# Patient Record
Sex: FEMALE | Race: WHITE | NOT HISPANIC OR LATINO | Employment: FULL TIME | URBAN - METROPOLITAN AREA
[De-identification: names, ages, dates, MRNs, and addresses within clinical notes are randomized per-mention and may not be internally consistent; named-entity substitution may affect disease eponyms.]

---

## 2021-06-23 ENCOUNTER — PATIENT MESSAGE (OUTPATIENT)
Dept: URGENT CARE | Facility: CLINIC | Age: 56
End: 2021-06-23

## 2021-06-23 ENCOUNTER — TELEPHONE (OUTPATIENT)
Dept: URGENT CARE | Facility: CLINIC | Age: 56
End: 2021-06-23

## 2021-06-23 ENCOUNTER — OFFICE VISIT (OUTPATIENT)
Dept: URGENT CARE | Facility: CLINIC | Age: 56
End: 2021-06-23
Payer: COMMERCIAL

## 2021-06-23 VITALS
BODY MASS INDEX: 29.84 KG/M2 | HEART RATE: 86 BPM | RESPIRATION RATE: 16 BRPM | HEIGHT: 60 IN | TEMPERATURE: 98 F | WEIGHT: 152 LBS | OXYGEN SATURATION: 98 % | SYSTOLIC BLOOD PRESSURE: 140 MMHG | DIASTOLIC BLOOD PRESSURE: 80 MMHG

## 2021-06-23 DIAGNOSIS — J02.9 SORE THROAT: ICD-10-CM

## 2021-06-23 DIAGNOSIS — J01.90 ACUTE SINUSITIS WITH SYMPTOMS GREATER THAN 10 DAYS: Primary | ICD-10-CM

## 2021-06-23 DIAGNOSIS — R05.9 COUGH: ICD-10-CM

## 2021-06-23 LAB
CTP QC/QA: YES
SARS-COV-2 RDRP RESP QL NAA+PROBE: NEGATIVE

## 2021-06-23 PROCEDURE — 3008F PR BODY MASS INDEX (BMI) DOCUMENTED: ICD-10-PCS | Mod: CPTII,S$GLB,, | Performed by: PHYSICIAN ASSISTANT

## 2021-06-23 PROCEDURE — 3008F BODY MASS INDEX DOCD: CPT | Mod: CPTII,S$GLB,, | Performed by: PHYSICIAN ASSISTANT

## 2021-06-23 PROCEDURE — U0002 COVID-19 LAB TEST NON-CDC: HCPCS | Mod: QW,S$GLB,, | Performed by: PHYSICIAN ASSISTANT

## 2021-06-23 PROCEDURE — U0002: ICD-10-PCS | Mod: QW,S$GLB,, | Performed by: PHYSICIAN ASSISTANT

## 2021-06-23 PROCEDURE — 99214 PR OFFICE/OUTPT VISIT, EST, LEVL IV, 30-39 MIN: ICD-10-PCS | Mod: S$GLB,,, | Performed by: PHYSICIAN ASSISTANT

## 2021-06-23 PROCEDURE — 99214 OFFICE O/P EST MOD 30 MIN: CPT | Mod: S$GLB,,, | Performed by: PHYSICIAN ASSISTANT

## 2021-06-23 RX ORDER — PROMETHAZINE HYDROCHLORIDE AND DEXTROMETHORPHAN HYDROBROMIDE 6.25; 15 MG/5ML; MG/5ML
5 SYRUP ORAL 3 TIMES DAILY PRN
Qty: 180 ML | Refills: 0 | Status: SHIPPED | OUTPATIENT
Start: 2021-06-23 | End: 2021-07-03

## 2021-06-23 RX ORDER — DOXYCYCLINE 100 MG/1
100 CAPSULE ORAL 2 TIMES DAILY
Qty: 20 CAPSULE | Refills: 0 | Status: SHIPPED | OUTPATIENT
Start: 2021-06-23 | End: 2021-07-03

## 2021-06-23 RX ORDER — CETIRIZINE HYDROCHLORIDE 10 MG/1
10 TABLET ORAL DAILY
COMMUNITY

## 2021-06-23 RX ORDER — MONTELUKAST SODIUM 10 MG/1
10 TABLET ORAL NIGHTLY
COMMUNITY
End: 2022-09-09

## 2021-06-23 RX ORDER — AMOXICILLIN AND CLAVULANATE POTASSIUM 875; 125 MG/1; MG/1
1 TABLET, FILM COATED ORAL 2 TIMES DAILY
Qty: 20 TABLET | Refills: 0 | Status: SHIPPED | OUTPATIENT
Start: 2021-06-23 | End: 2021-07-03

## 2021-06-23 RX ORDER — PREDNISONE 20 MG/1
TABLET ORAL
Qty: 10 TABLET | Refills: 0 | Status: SHIPPED | OUTPATIENT
Start: 2021-06-23 | End: 2022-09-09

## 2021-06-23 RX ORDER — BUPROPION HYDROCHLORIDE 75 MG/1
150 TABLET ORAL DAILY
COMMUNITY

## 2021-07-05 ENCOUNTER — OFFICE VISIT (OUTPATIENT)
Dept: URGENT CARE | Facility: CLINIC | Age: 56
End: 2021-07-05
Payer: COMMERCIAL

## 2021-07-05 VITALS
RESPIRATION RATE: 16 BRPM | TEMPERATURE: 98 F | HEART RATE: 90 BPM | SYSTOLIC BLOOD PRESSURE: 134 MMHG | HEIGHT: 60 IN | OXYGEN SATURATION: 98 % | WEIGHT: 150 LBS | BODY MASS INDEX: 29.45 KG/M2 | DIASTOLIC BLOOD PRESSURE: 80 MMHG

## 2021-07-05 DIAGNOSIS — N39.0 URINARY TRACT INFECTION WITHOUT HEMATURIA, SITE UNSPECIFIED: Primary | ICD-10-CM

## 2021-07-05 DIAGNOSIS — J06.9 UPPER RESPIRATORY TRACT INFECTION, UNSPECIFIED TYPE: ICD-10-CM

## 2021-07-05 DIAGNOSIS — J02.9 SORE THROAT: ICD-10-CM

## 2021-07-05 LAB
BILIRUB UR QL STRIP: NEGATIVE
CTP QC/QA: YES
GLUCOSE UR QL STRIP: NEGATIVE
KETONES UR QL STRIP: NEGATIVE
LEUKOCYTE ESTERASE UR QL STRIP: NEGATIVE
PH, POC UA: 5.5 (ref 5–8)
POC BLOOD, URINE: NEGATIVE
POC NITRATES, URINE: NEGATIVE
PROT UR QL STRIP: NEGATIVE
SARS-COV-2 RDRP RESP QL NAA+PROBE: NEGATIVE
SP GR UR STRIP: 1.02 (ref 1–1.03)
UROBILINOGEN UR STRIP-ACNC: NORMAL (ref 0.1–1.1)

## 2021-07-05 PROCEDURE — 81003 URINALYSIS AUTO W/O SCOPE: CPT | Mod: QW,S$GLB,, | Performed by: NURSE PRACTITIONER

## 2021-07-05 PROCEDURE — U0002: ICD-10-PCS | Mod: QW,S$GLB,, | Performed by: FAMILY MEDICINE

## 2021-07-05 PROCEDURE — U0002 COVID-19 LAB TEST NON-CDC: HCPCS | Mod: QW,S$GLB,, | Performed by: FAMILY MEDICINE

## 2021-07-05 PROCEDURE — 81003 POCT URINALYSIS, DIPSTICK, AUTOMATED, W/O SCOPE: ICD-10-PCS | Mod: QW,S$GLB,, | Performed by: NURSE PRACTITIONER

## 2021-07-05 PROCEDURE — 99214 OFFICE O/P EST MOD 30 MIN: CPT | Mod: S$GLB,,, | Performed by: FAMILY MEDICINE

## 2021-07-05 PROCEDURE — 99214 PR OFFICE/OUTPT VISIT, EST, LEVL IV, 30-39 MIN: ICD-10-PCS | Mod: S$GLB,,, | Performed by: FAMILY MEDICINE

## 2021-07-05 PROCEDURE — 3008F BODY MASS INDEX DOCD: CPT | Mod: CPTII,S$GLB,, | Performed by: FAMILY MEDICINE

## 2021-07-05 PROCEDURE — 3008F PR BODY MASS INDEX (BMI) DOCUMENTED: ICD-10-PCS | Mod: CPTII,S$GLB,, | Performed by: FAMILY MEDICINE

## 2021-07-05 RX ORDER — CEPHALEXIN 500 MG/1
500 CAPSULE ORAL EVERY 12 HOURS
Qty: 14 CAPSULE | Refills: 0 | Status: SHIPPED | OUTPATIENT
Start: 2021-07-05 | End: 2021-07-12

## 2021-07-05 RX ORDER — AMOXICILLIN AND CLAVULANATE POTASSIUM 875; 125 MG/1; MG/1
1 TABLET, FILM COATED ORAL
COMMUNITY
End: 2022-09-07

## 2021-07-08 LAB
BACTERIA UR CULT: NORMAL
BACTERIA UR CULT: NORMAL

## 2022-02-14 ENCOUNTER — OFFICE VISIT (OUTPATIENT)
Dept: ORTHOPEDICS | Facility: CLINIC | Age: 57
End: 2022-02-14
Attending: ORTHOPAEDIC SURGERY
Payer: COMMERCIAL

## 2022-02-14 VITALS
RESPIRATION RATE: 18 BRPM | HEIGHT: 60 IN | OXYGEN SATURATION: 99 % | HEART RATE: 90 BPM | BODY MASS INDEX: 30.41 KG/M2 | DIASTOLIC BLOOD PRESSURE: 60 MMHG | WEIGHT: 154.88 LBS | SYSTOLIC BLOOD PRESSURE: 115 MMHG

## 2022-02-14 DIAGNOSIS — M25.559 HIP PAIN: Primary | ICD-10-CM

## 2022-02-14 DIAGNOSIS — M70.62 TROCHANTERIC BURSITIS OF LEFT HIP: Primary | ICD-10-CM

## 2022-02-14 PROCEDURE — 99203 PR OFFICE/OUTPT VISIT, NEW, LEVL III, 30-44 MIN: ICD-10-PCS | Mod: 25,S$GLB,, | Performed by: ORTHOPAEDIC SURGERY

## 2022-02-14 PROCEDURE — 20610 LARGE JOINT ASPIRATION/INJECTION: L GREATER TROCHANTERIC BURSA: ICD-10-PCS | Mod: S$GLB,,, | Performed by: ORTHOPAEDIC SURGERY

## 2022-02-14 PROCEDURE — 1159F MED LIST DOCD IN RCRD: CPT | Mod: CPTII,S$GLB,, | Performed by: ORTHOPAEDIC SURGERY

## 2022-02-14 PROCEDURE — 20610 DRAIN/INJ JOINT/BURSA W/O US: CPT | Mod: S$GLB,,, | Performed by: ORTHOPAEDIC SURGERY

## 2022-02-14 PROCEDURE — 3078F PR MOST RECENT DIASTOLIC BLOOD PRESSURE < 80 MM HG: ICD-10-PCS | Mod: CPTII,S$GLB,, | Performed by: ORTHOPAEDIC SURGERY

## 2022-02-14 PROCEDURE — 99203 OFFICE O/P NEW LOW 30 MIN: CPT | Mod: 25,S$GLB,, | Performed by: ORTHOPAEDIC SURGERY

## 2022-02-14 PROCEDURE — 3078F DIAST BP <80 MM HG: CPT | Mod: CPTII,S$GLB,, | Performed by: ORTHOPAEDIC SURGERY

## 2022-02-14 PROCEDURE — 1159F PR MEDICATION LIST DOCUMENTED IN MEDICAL RECORD: ICD-10-PCS | Mod: CPTII,S$GLB,, | Performed by: ORTHOPAEDIC SURGERY

## 2022-02-14 PROCEDURE — 3008F PR BODY MASS INDEX (BMI) DOCUMENTED: ICD-10-PCS | Mod: CPTII,S$GLB,, | Performed by: ORTHOPAEDIC SURGERY

## 2022-02-14 PROCEDURE — 3074F SYST BP LT 130 MM HG: CPT | Mod: CPTII,S$GLB,, | Performed by: ORTHOPAEDIC SURGERY

## 2022-02-14 PROCEDURE — 99999 PR PBB SHADOW E&M-EST. PATIENT-LVL III: CPT | Mod: PBBFAC,,, | Performed by: ORTHOPAEDIC SURGERY

## 2022-02-14 PROCEDURE — 3074F PR MOST RECENT SYSTOLIC BLOOD PRESSURE < 130 MM HG: ICD-10-PCS | Mod: CPTII,S$GLB,, | Performed by: ORTHOPAEDIC SURGERY

## 2022-02-14 PROCEDURE — 3008F BODY MASS INDEX DOCD: CPT | Mod: CPTII,S$GLB,, | Performed by: ORTHOPAEDIC SURGERY

## 2022-02-14 PROCEDURE — 99999 PR PBB SHADOW E&M-EST. PATIENT-LVL III: ICD-10-PCS | Mod: PBBFAC,,, | Performed by: ORTHOPAEDIC SURGERY

## 2022-02-14 RX ORDER — TRIAMCINOLONE ACETONIDE 40 MG/ML
40 INJECTION, SUSPENSION INTRA-ARTICULAR; INTRAMUSCULAR
Status: DISCONTINUED | OUTPATIENT
Start: 2022-02-14 | End: 2022-02-14 | Stop reason: HOSPADM

## 2022-02-14 RX ADMIN — TRIAMCINOLONE ACETONIDE 40 MG: 40 INJECTION, SUSPENSION INTRA-ARTICULAR; INTRAMUSCULAR at 08:02

## 2022-02-14 NOTE — PROGRESS NOTES
NEW PATIENT ORTHOPAEDIC: HIP    PRIMARY CARE PHYSICIAN: Primary Doctor No   REFERRING PROVIDER: Khoa Goodman MD  [address] [facility]  [place] [address]     ASSESSMENT & PLAN:    Impression:  Left Hip Trochanteric Bursitis    Follow Up Plan: PRN     Injection:     Malika Curtis has physical exam evidence of trochanteric bursitis and wishes to pursue an injection. We discussed the risk, benefits, and expectations regarding injection. We discussed alternative modalities which included physical therapy, topical and prescription pain medications, ambulatory devices. They would like to proceed with injection today. See procedure note for billing purposes. If fails injection modality will consider alternative treatments or referrals.     Non operative care:    Malika Curtis has physical exam evidence of above and wishes to pursue an non-operative care. I am recommending the following: injection, activity modification. If injection fails to help, next step would be physical therapy.     The patient has been ordered:  Office Hip Injection    CONSULTS:   None    ACTIVE PROBLEM LIST  There is no problem list on file for this patient.          SUBJECTIVE    CHIEF COMPLAINT: Hip Pain    HPI:   Malika Curtis is a 57 y.o. female here for evaluation and management of left hip pain. There is not a specific incident that brought about this pain. she has had progressive problems with the hip(s) starting 4 days ago and is progressing which is now interfering with activities which include: walking, functional household ADL's, rising from a sitting position, standing for prolonged periods of time, dressing and climbing stairs     Currently the pain in the joint is moderate with activity.  The pain is constant and is located in lateral hip.  The pain is described as aching, severe and sharp. Relieving factors include repositioning. Start up pain, lateral hip pain, cannot lie on side.     Malika Curtis has no additional  complaints.     PROGRESSIVE SYMPTOMS:  Pain impacting sleep  Pain impacting work  Pain worsened by weight bearing    FUNCTIONAL STATUS:   Climb a flight of stairs or walk up a hill     PREVIOUS TREATMENTS:  Medical: None  Physical Therapy: Activities Modified , ice, heat, massage, stretching   Previous Orthopaedic Surgery: None    REVIEW OF SYSTEMS:  PAIN ASSESSMENT:  See HPI.  MUSCULOSKELETAL: See HPI.  OTHER 10 point review of systems is negative except as stated in HPI above    PAST MEDICAL HISTORY   has no past medical history on file.     PAST SURGICAL HISTORY   has no past surgical history on file.     FAMILY HISTORY  family history is not on file.     SOCIAL HISTORY   reports that she has never smoked. She does not have any smokeless tobacco history on file.     ALLERGIES   Review of patient's allergies indicates:   Allergen Reactions    Sulfa (sulfonamide antibiotics)         MEDICATIONS   Current Outpatient Medications on File Prior to Visit   Medication Sig Dispense Refill    amoxicillin-clavulanate 875-125mg (AUGMENTIN) 875-125 mg per tablet Take 1 tablet by mouth every 12 (twelve) hours.      buPROPion (WELLBUTRIN) 75 MG tablet Take 75 mg by mouth 2 (two) times daily.      cetirizine (ZYRTEC) 10 MG tablet Take 10 mg by mouth once daily.      montelukast (SINGULAIR) 10 mg tablet Take 10 mg by mouth every evening.      predniSONE (DELTASONE) 20 MG tablet Take 2 tablets daily for 5 days (Patient not taking: Reported on 7/5/2021) 10 tablet 0     No current facility-administered medications on file prior to visit.          PHYSICAL EXAM   vitals were not taken for this visit.   There is no height or weight on file to calculate BMI.      All other systems deferred.  GENERAL:  No acute distress  HABITUS: Normal  GAIT: Antalgic  SKIN: Normal     HIP EXAM:    left:   ROM:   Flexion: 120 degrees    Internal Rotation: 30 degrees    External Rotation: 30 degrees    Abduction: 45 degrees    Adduction: 20  degrees  No apparent leg length discrepancy    Palpation: yes tenderness over Greater trochanter and Gluteal insertions   Pain is not reproduced with IR or ER of the hip  Strength: 5/5 hip flexion, abduction, knee flexion and extension   Straight leg raise: Negative   Neurovascular Status: Sensation intact to light touch in Sural, saphenous, SPN, DPN, Tibial nerve distribution  2+ pulse DP/PT, normal capillary refill, foot has normal warmth    DATA:  Diagnostic tests reviewed for today's visit:     Hip radiographs appears normal. No evidence of fracture, osseous abnormalities, or significant degenerative changes. There is small area of ossification about the greater trochanter on the left.

## 2022-02-14 NOTE — PROCEDURES
Large Joint Aspiration/Injection: L greater trochanteric bursa    Date/Time: 2/14/2022 8:40 AM  Performed by: Khoa Goodman MD  Authorized by: Khoa Goodman MD     Consent Done?:  Yes (Verbal)  Indications:  Pain  Site marked: the procedure site was marked    Timeout: prior to procedure the correct patient, procedure, and site was verified    Prep: patient was prepped and draped in usual sterile fashion      Local anesthesia used?: Yes    Local anesthetic:  Lidocaine 1% with epinephrine    Details:  Needle Size:  22 G  Location:  Hip  Site:  L greater trochanteric bursa  Medications:  40 mg triamcinolone acetonide 40 mg/mL  Patient tolerance:  Patient tolerated the procedure well with no immediate complications

## 2022-04-28 ENCOUNTER — OFFICE VISIT (OUTPATIENT)
Dept: ORTHOPEDICS | Facility: CLINIC | Age: 57
End: 2022-04-28
Payer: COMMERCIAL

## 2022-04-28 VITALS
WEIGHT: 153.19 LBS | SYSTOLIC BLOOD PRESSURE: 118 MMHG | OXYGEN SATURATION: 99 % | RESPIRATION RATE: 15 BRPM | DIASTOLIC BLOOD PRESSURE: 85 MMHG | HEART RATE: 94 BPM | BODY MASS INDEX: 30.08 KG/M2 | HEIGHT: 60 IN

## 2022-04-28 DIAGNOSIS — M70.62 TROCHANTERIC BURSITIS OF LEFT HIP: Primary | ICD-10-CM

## 2022-04-28 DIAGNOSIS — M76.02 GLUTEAL TENDINITIS OF LEFT BUTTOCK: ICD-10-CM

## 2022-04-28 PROCEDURE — 3008F BODY MASS INDEX DOCD: CPT | Mod: CPTII,S$GLB,, | Performed by: ORTHOPAEDIC SURGERY

## 2022-04-28 PROCEDURE — 3079F DIAST BP 80-89 MM HG: CPT | Mod: CPTII,S$GLB,, | Performed by: ORTHOPAEDIC SURGERY

## 2022-04-28 PROCEDURE — 99213 PR OFFICE/OUTPT VISIT, EST, LEVL III, 20-29 MIN: ICD-10-PCS | Mod: S$GLB,,, | Performed by: ORTHOPAEDIC SURGERY

## 2022-04-28 PROCEDURE — 1159F PR MEDICATION LIST DOCUMENTED IN MEDICAL RECORD: ICD-10-PCS | Mod: CPTII,S$GLB,, | Performed by: ORTHOPAEDIC SURGERY

## 2022-04-28 PROCEDURE — 99999 PR PBB SHADOW E&M-EST. PATIENT-LVL IV: ICD-10-PCS | Mod: PBBFAC,,, | Performed by: ORTHOPAEDIC SURGERY

## 2022-04-28 PROCEDURE — 1159F MED LIST DOCD IN RCRD: CPT | Mod: CPTII,S$GLB,, | Performed by: ORTHOPAEDIC SURGERY

## 2022-04-28 PROCEDURE — 3074F SYST BP LT 130 MM HG: CPT | Mod: CPTII,S$GLB,, | Performed by: ORTHOPAEDIC SURGERY

## 2022-04-28 PROCEDURE — 3008F PR BODY MASS INDEX (BMI) DOCUMENTED: ICD-10-PCS | Mod: CPTII,S$GLB,, | Performed by: ORTHOPAEDIC SURGERY

## 2022-04-28 PROCEDURE — 99999 PR PBB SHADOW E&M-EST. PATIENT-LVL IV: CPT | Mod: PBBFAC,,, | Performed by: ORTHOPAEDIC SURGERY

## 2022-04-28 PROCEDURE — 99213 OFFICE O/P EST LOW 20 MIN: CPT | Mod: S$GLB,,, | Performed by: ORTHOPAEDIC SURGERY

## 2022-04-28 PROCEDURE — 3074F PR MOST RECENT SYSTOLIC BLOOD PRESSURE < 130 MM HG: ICD-10-PCS | Mod: CPTII,S$GLB,, | Performed by: ORTHOPAEDIC SURGERY

## 2022-04-28 PROCEDURE — 3079F PR MOST RECENT DIASTOLIC BLOOD PRESSURE 80-89 MM HG: ICD-10-PCS | Mod: CPTII,S$GLB,, | Performed by: ORTHOPAEDIC SURGERY

## 2022-04-28 RX ORDER — MELOXICAM 15 MG/1
15 TABLET ORAL DAILY
Qty: 30 TABLET | Refills: 1 | Status: SHIPPED | OUTPATIENT
Start: 2022-04-28 | End: 2022-07-28 | Stop reason: SDUPTHER

## 2022-04-28 NOTE — PROGRESS NOTES
NEW PATIENT ORTHOPAEDIC: HIP    PRIMARY CARE PHYSICIAN: Primary Doctor No   REFERRING PROVIDER: No referring provider defined for this encounter.     ASSESSMENT & PLAN:    Impression:  Left Hip Trochanteric Bursitis   Left Hip gluteal tendonitis     Follow Up Plan: PRN     Non operative care:    Malika Curtis has physical exam evidence of above and wishes to pursue an non-operative care. I am recommending the following: physical therapy, mobic.  Patient is following up 2 months after receiving an injection into her trochanteric bursa.  She reports that approximately 3 week she had almost complete resolution of her pain and symptoms.  Today she is reporting return of pain and some associated tightness in her lateral hip and back.  Review of her radiographs do demonstrate some area of gluteal tendinopathy and calcification.  There is also some mild scoliosis on her films.  She denies any significant radicular pains to suggest a back etiology however she is having difficulty sitting and cushion pillows help with her pain.  This would be atypical for I trochanteric bursitis.  She is sore in and along her gluteal tendons today so this may be more consistent with gluteal tendinopathy.  Ultimately I think the patient would benefit from physical therapy and possible consideration of dry needling with them.  In the interim I want her to take Mobic for pain control.  We looked over and reviewed some exercises for stretching and strengthening of her hip abductors.  If she takes the Mobic and this does not improve her pain she can come back in for repeat injection or I could consider and MRI or eval of lumbar spine.  But I think therapy will be of her biggest benefit.      The patient has been ordered:  Office Hip Injection    CONSULTS:   None    ACTIVE PROBLEM LIST  There is no problem list on file for this patient.       SUBJECTIVE    CHIEF COMPLAINT: Hip Pain    HPI:   Malika Curtis is a 57 y.o. female here for  evaluation and management of left hip pain. There is not a specific incident that brought about this pain. she has had progressive problems with the hip(s) starting 4 days ago and is progressing which is now interfering with activities which include: walking, functional household ADL's, rising from a sitting position, standing for prolonged periods of time, dressing and climbing stairs     Currently the pain in the joint is moderate with activity.  The pain is constant and is located in lateral hip.  The pain is described as aching, severe and sharp. Relieving factors include repositioning. Start up pain, lateral hip pain, cannot lie on side.     Malika Curtis has no additional complaints.     Interval history 4/28/22: shot helped for 3 weeks, since return of pain. Lateral hip. Worse with sitting and lying down. Interested in alternative modalities.     PROGRESSIVE SYMPTOMS:  Pain impacting sleep  Pain impacting work  Pain worsened by weight bearing    FUNCTIONAL STATUS:   Climb a flight of stairs or walk up a hill     PREVIOUS TREATMENTS:  Medical: None  Physical Therapy: Activities Modified , ice, heat, massage, stretching   Previous Orthopaedic Surgery: None    REVIEW OF SYSTEMS:  PAIN ASSESSMENT:  See HPI.  MUSCULOSKELETAL: See HPI.  OTHER 10 point review of systems is negative except as stated in HPI above    PAST MEDICAL HISTORY   has no past medical history on file.     PAST SURGICAL HISTORY   has no past surgical history on file.     FAMILY HISTORY  family history is not on file.     SOCIAL HISTORY   reports that she has never smoked. She has never used smokeless tobacco. She reports previous alcohol use. She reports that she does not use drugs.     ALLERGIES   Review of patient's allergies indicates:   Allergen Reactions    Sulfa (sulfonamide antibiotics)         MEDICATIONS   Current Outpatient Medications on File Prior to Visit   Medication Sig Dispense Refill    buPROPion (WELLBUTRIN) 75 MG tablet  Take 75 mg by mouth 2 (two) times daily.      cetirizine (ZYRTEC) 10 MG tablet Take 10 mg by mouth once daily.      montelukast (SINGULAIR) 10 mg tablet Take 10 mg by mouth every evening.      amoxicillin-clavulanate 875-125mg (AUGMENTIN) 875-125 mg per tablet Take 1 tablet by mouth every 12 (twelve) hours.      predniSONE (DELTASONE) 20 MG tablet Take 2 tablets daily for 5 days (Patient not taking: No sig reported) 10 tablet 0     No current facility-administered medications on file prior to visit.          PHYSICAL EXAM   height is 5' (1.524 m) and weight is 69.5 kg (153 lb 3.2 oz). Her blood pressure is 118/85 and her pulse is 94. Her respiration is 15 and oxygen saturation is 99%.   Body mass index is 29.92 kg/m².      All other systems deferred.  GENERAL:  No acute distress  HABITUS: Normal  GAIT: Antalgic  SKIN: Normal     HIP EXAM:    left:   ROM:   Flexion: 120 degrees    Internal Rotation: 30 degrees    External Rotation: 30 degrees    Abduction: 45 degrees    Adduction: 20 degrees  No apparent leg length discrepancy    Palpation: yes tenderness over Greater trochanter and Gluteal insertions   Pain is not reproduced with IR or ER of the hip  Strength: 5/5 hip flexion, abduction, knee flexion and extension   Straight leg raise: Negative   Neurovascular Status: Sensation intact to light touch in Sural, saphenous, SPN, DPN, Tibial nerve distribution  2+ pulse DP/PT, normal capillary refill, foot has normal warmth    DATA:  Diagnostic tests reviewed for today's visit:     Hip radiographs appears normal. No evidence of fracture, osseous abnormalities, or significant degenerative changes. There is small area of ossification about the greater trochanter on the left.

## 2022-05-09 ENCOUNTER — OFFICE VISIT (OUTPATIENT)
Dept: OBSTETRICS AND GYNECOLOGY | Facility: CLINIC | Age: 57
End: 2022-05-09
Payer: COMMERCIAL

## 2022-05-09 VITALS
BODY MASS INDEX: 30.05 KG/M2 | SYSTOLIC BLOOD PRESSURE: 128 MMHG | DIASTOLIC BLOOD PRESSURE: 90 MMHG | WEIGHT: 153.88 LBS

## 2022-05-09 DIAGNOSIS — N95.0 POSTMENOPAUSAL BLEEDING: Primary | ICD-10-CM

## 2022-05-09 DIAGNOSIS — Z12.31 BREAST CANCER SCREENING BY MAMMOGRAM: ICD-10-CM

## 2022-05-09 DIAGNOSIS — Z01.419 GYNECOLOGIC EXAM NORMAL: ICD-10-CM

## 2022-05-09 PROCEDURE — 99999 PR PBB SHADOW E&M-EST. PATIENT-LVL III: ICD-10-PCS | Mod: PBBFAC,,, | Performed by: OBSTETRICS & GYNECOLOGY

## 2022-05-09 PROCEDURE — 99386 PR PREVENTIVE VISIT,NEW,40-64: ICD-10-PCS | Mod: S$GLB,,, | Performed by: OBSTETRICS & GYNECOLOGY

## 2022-05-09 PROCEDURE — 3080F PR MOST RECENT DIASTOLIC BLOOD PRESSURE >= 90 MM HG: ICD-10-PCS | Mod: CPTII,S$GLB,, | Performed by: OBSTETRICS & GYNECOLOGY

## 2022-05-09 PROCEDURE — 3074F PR MOST RECENT SYSTOLIC BLOOD PRESSURE < 130 MM HG: ICD-10-PCS | Mod: CPTII,S$GLB,, | Performed by: OBSTETRICS & GYNECOLOGY

## 2022-05-09 PROCEDURE — 3008F PR BODY MASS INDEX (BMI) DOCUMENTED: ICD-10-PCS | Mod: CPTII,S$GLB,, | Performed by: OBSTETRICS & GYNECOLOGY

## 2022-05-09 PROCEDURE — 99999 PR PBB SHADOW E&M-EST. PATIENT-LVL III: CPT | Mod: PBBFAC,,, | Performed by: OBSTETRICS & GYNECOLOGY

## 2022-05-09 PROCEDURE — 1159F PR MEDICATION LIST DOCUMENTED IN MEDICAL RECORD: ICD-10-PCS | Mod: CPTII,S$GLB,, | Performed by: OBSTETRICS & GYNECOLOGY

## 2022-05-09 PROCEDURE — 3008F BODY MASS INDEX DOCD: CPT | Mod: CPTII,S$GLB,, | Performed by: OBSTETRICS & GYNECOLOGY

## 2022-05-09 PROCEDURE — 3080F DIAST BP >= 90 MM HG: CPT | Mod: CPTII,S$GLB,, | Performed by: OBSTETRICS & GYNECOLOGY

## 2022-05-09 PROCEDURE — 87624 HPV HI-RISK TYP POOLED RSLT: CPT | Performed by: OBSTETRICS & GYNECOLOGY

## 2022-05-09 PROCEDURE — 1159F MED LIST DOCD IN RCRD: CPT | Mod: CPTII,S$GLB,, | Performed by: OBSTETRICS & GYNECOLOGY

## 2022-05-09 PROCEDURE — 88175 CYTOPATH C/V AUTO FLUID REDO: CPT | Performed by: OBSTETRICS & GYNECOLOGY

## 2022-05-09 PROCEDURE — 99386 PREV VISIT NEW AGE 40-64: CPT | Mod: S$GLB,,, | Performed by: OBSTETRICS & GYNECOLOGY

## 2022-05-09 PROCEDURE — 3074F SYST BP LT 130 MM HG: CPT | Mod: CPTII,S$GLB,, | Performed by: OBSTETRICS & GYNECOLOGY

## 2022-05-09 RX ORDER — BUTALBITAL, ACETAMINOPHEN AND CAFFEINE 50; 325; 40 MG/1; MG/1; MG/1
1 TABLET ORAL EVERY 6 HOURS PRN
COMMUNITY
Start: 2022-03-29

## 2022-05-09 RX ORDER — DIAZEPAM 5 MG/1
5 TABLET ORAL DAILY PRN
COMMUNITY
Start: 2022-03-30

## 2022-05-09 RX ORDER — ALBUTEROL SULFATE 0.83 MG/ML
2.5 SOLUTION RESPIRATORY (INHALATION) EVERY 4 HOURS PRN
COMMUNITY
Start: 2022-03-30

## 2022-05-09 RX ORDER — NEOMYCIN SULFATE, POLYMYXIN B SULFATE, HYDROCORTISONE 3.5; 10000; 1 MG/ML; [USP'U]/ML; MG/ML
SOLUTION/ DROPS AURICULAR (OTIC)
COMMUNITY
Start: 2022-02-05 | End: 2022-09-09

## 2022-05-09 NOTE — PROGRESS NOTES
Subjective:       Patient ID: Malika Curtis is a 57 y.o. female.    Chief Complaint:  Well Woman (Post menopausal)      History of Present Illness  HPI  Annual Exam-Postmenopausal  Patient presents for annual exam. The patient has no complaints today. The patient is sexually active. GYN screening history: last pap: was normal and patient does not recall when last pap was. The patient is not taking hormone replacement therapy. Patient reports post-menopausal vaginal bleeding. Bleeding was only 1 episode on 4/07/22.  Last prior menses was 7/1/2020.  PT denies any blood in underwear or pad.  Pt only noticed bleeding after she wiped from urinating.  Bleeding only lasted one day.  The patient wears seatbelts: yes. The patient participates in regular exercise: no. Has the patient ever been transfused or tattooed?: not asked. The patient reports that there is not domestic violence in her life.    Colonoscopy:  Pt did colorgard last year and was normal.    MMG:  > 1 yr    GYN & OB History  No LMP recorded. Patient is postmenopausal.   Date of Last Pap: No result found    OB History   No obstetric history on file.       Review of Systems  Review of Systems   Constitutional: Negative.    Respiratory: Negative.    Cardiovascular: Negative.    Gastrointestinal: Negative.    Endocrine: Negative.    Genitourinary: Negative.    Musculoskeletal: Negative.    Neurological: Negative.    Hematological: Negative.    Psychiatric/Behavioral: Negative.    Breast: negative.            Objective:    Physical Exam:   Constitutional: She is oriented to person, place, and time. She appears well-developed and well-nourished. No distress.    HENT:   Head: Normocephalic and atraumatic.       Pulmonary/Chest: Effort normal. No respiratory distress. Right breast exhibits no inverted nipple, no mass, no nipple discharge, no skin change, no tenderness, presence, no bleeding, no swelling, no mastectomy, no augmentation and no lumpectomy. Left  breast exhibits no inverted nipple, no mass, no nipple discharge, no skin change, no tenderness, presence, no bleeding, no swelling, no mastectomy, no augmentation and no lumpectomy.        Abdominal: Soft. She exhibits no distension and no mass. There is no abdominal tenderness. There is no rebound and no guarding.     Genitourinary:    Vagina, uterus, right adnexa and left adnexa normal.   Rectum:      Guaiac result negative.      External hemorrhoid present.      No anal fissure, tenderness or abnormal anal tone.   Guaiac negative stool.    Pelvic exam was performed with patient supine.   The external female genitalia was normal.   No external genitalia lesions identified,Genitalia hair distrobution normal .   Labial bartholins normal.There is no rash, tenderness, lesion or injury on the right labia. There is no rash, tenderness, lesion or injury on the left labia. Cervix is normal. No no adexnal prolapse. Right adnexum displays no mass, no tenderness and no fullness. Left adnexum displays no mass, no tenderness and no fullness. Vagina exhibits no lesion. There is rectocele (grade 1) and cystocele (grade 1) in the vagina. No erythema,  no vaginal discharge, tenderness, bleeding or unspecified prolapse of vaginal walls in the vagina.    No foreign body in the vagina.      No signs of injury in the vagina.   Vagina was moist.Cervix exhibits no motion tenderness, no lesion, no discharge, no friability, no lesion, no tenderness and no polyp.    pap smear completedUterus consistancy normal. and Uerus contour normal  Uterus is not deviated, not enlarged, not fixed, not tender, not hosting fibroids and no uterine prolapse. Normal urethral meatus.Urethral Meatus exhibits: urethral lesion and prolapsedUrethra findings: no urethral mass, no tenderness and no urethral scarringBladder findings: no bladder distention and no bladder tenderness          Musculoskeletal: Normal range of motion and moves all extremeties. No  tenderness.       Neurological: She is alert and oriented to person, place, and time. No cranial nerve deficit. Coordination normal.    Skin: She is not diaphoretic.    Psychiatric: She has a normal mood and affect. Her behavior is normal. Judgment and thought content normal.          Assessment:        1. Postmenopausal bleeding    2. Breast cancer screening by mammogram    3. Gynecologic exam normal               Plan:      1.  Pap and HR HPV collected  2.  Pelvic US ordered; will consider EMBx if thickened endometrium  3. MMG ordered

## 2022-05-11 NOTE — PROGRESS NOTES
"See Plan of Care for complete Physical Therapy Evaluation.                                                       Physical Therapy Initial Evaluation     Name: Malika Curtis  Clinic Number: [id number]    Therapy Diagnosis:   Encounter Diagnoses   Name Primary?    Trochanteric bursitis of left hip     Decreased strength of lower extremity     Decreased range of motion of left lower extremity     Postural imbalance      Physician: Khoa Goodman MD    Physician Orders: PT Eval and Treat   Medical Diagnosis from Referral: M70.62 (ICD-10-CM) - Trochanteric bursitis of left hip  Evaluation Date: 5/12/2022  Authorization Period Expiration: 12/31/2022  Plan of Care Expiration: 8/12/22  Visit # / Visits authorized: 1/ 1    Time In: 1105a  Time Out: 1148a  Total Billable Time: 43 minutes    Precautions: Standard      TREATMENT     Treatment Time In: 1132a  Treatment Time Out: 1148a  Total Treatment time separate from Evaluation: 16 minutes    Malkia received therapeutic exercises to develop strength, ROM and flexibility for 16 minutes including:    Supine ITB str c/ Strap 30"x3 LLE  Supine hip flexor str c/ strap 30"x3  Seated Piriformis str 30"x3 LLE  Bridges 2x10    NV: Nustep, pallof press, dead bug, clams, prone hip ext, standing hip abduction     Short Term GOALS: 5 weeks. Pt agrees with goals set.  1. Patient demonstrates independence with HEP.   2. Patient demonstrates independence with Postural Awareness.   3. Patient demonstrates independence with body mechanics.   4. Patient will report pain of </=6/10 at worst, on 0-10 pain scale, with all activity  5. Patient demonstrates ability to perform light activities around her home with no difficulty to improve tolerance to functional tasks pain free.   6. Patient demonstrates increased global hip strength to 4+/5 or greater to improve tolerance to functional activities pain free    Long Term GOALS: 10 weeks. Pt agrees with goals set.  1. Patient demonstrates " ability to stand for 1 hour or greater with little to no difficulty to improve tolerance to functional tasks pain free  2. Patient demonstrates increased strength BLE's to 5/5 or greater to improve tolerance to functional activities pain free.   3. Patient demonstrates improved overall function per FOTO Hip Survey to 30% Limitation or less.   4. Patient will report pain of </=2/10 at worst, on 0-10 pain scale, with all activity  5. Patient demonstrates ability to sit on the floor to fold laundry with little to no difficulty to return to PLOF.     PLAN     Plan of care Certification: 5/12/2022 to 8/12/22.    Outpatient Physical Therapy 2 times weekly for 10 weeks to include the following interventions: Cervical/Lumbar Traction, Gait Training, Manual Therapy, Moist Heat/ Ice, Neuromuscular Re-ed, Patient Education, Therapeutic Activities, Therapeutic Exercise and FDN.  Pt may be seen by PTA as part of the rehabilitation team.     Jen Hamm, PT,DPT  5/12/2022      .

## 2022-05-12 ENCOUNTER — CLINICAL SUPPORT (OUTPATIENT)
Dept: REHABILITATION | Facility: HOSPITAL | Age: 57
End: 2022-05-12
Attending: ORTHOPAEDIC SURGERY
Payer: COMMERCIAL

## 2022-05-12 DIAGNOSIS — M25.662 DECREASED RANGE OF MOTION OF LEFT LOWER EXTREMITY: ICD-10-CM

## 2022-05-12 DIAGNOSIS — R29.898 DECREASED STRENGTH OF LOWER EXTREMITY: ICD-10-CM

## 2022-05-12 DIAGNOSIS — R29.3 POSTURAL IMBALANCE: ICD-10-CM

## 2022-05-12 DIAGNOSIS — M70.62 TROCHANTERIC BURSITIS OF LEFT HIP: ICD-10-CM

## 2022-05-12 PROCEDURE — 97161 PT EVAL LOW COMPLEX 20 MIN: CPT | Mod: PN

## 2022-05-12 PROCEDURE — 97110 THERAPEUTIC EXERCISES: CPT | Mod: PN

## 2022-05-13 LAB
CLINICAL INFO: NORMAL
CYTO CVX: NORMAL
CYTOLOGIST CVX/VAG CYTO: NORMAL
CYTOLOGIST CVX/VAG CYTO: NORMAL
CYTOLOGY CMNT CVX/VAG CYTO-IMP: NORMAL
CYTOLOGY PAP THIN PREP EXPLANATION: NORMAL
DATE OF PREVIOUS PAP: NORMAL
DATE PREVIOUS BX: NO
GEN CATEG CVX/VAG CYTO-IMP: NORMAL
HPV I/H RISK 4 DNA CVX QL NAA+PROBE: NOT DETECTED
LMP START DATE: 2020
MICROORGANISM CVX/VAG CYTO: NORMAL
PATHOLOGIST CVX/VAG CYTO: NORMAL
SERVICE CMNT-IMP: NORMAL
SPECIMEN SOURCE CVX/VAG CYTO: NORMAL
STAT OF ADQ CVX/VAG CYTO-IMP: NORMAL

## 2022-05-13 NOTE — PLAN OF CARE
Physical Therapy Initial Evaluation     Name: Malika Curtis  Clinic Number: [id number]    Therapy Diagnosis:   Encounter Diagnoses   Name Primary?    Trochanteric bursitis of left hip     Decreased strength of lower extremity     Decreased range of motion of left lower extremity     Postural imbalance      Physician: Khoa Goodman MD    Physician Orders: PT Eval and Treat   Medical Diagnosis from Referral: M70.62 (ICD-10-CM) - Trochanteric bursitis of left hip  Evaluation Date: 5/12/2022  Authorization Period Expiration: 12/31/2022  Plan of Care Expiration: 8/12/22  Visit # / Visits authorized: 1/ 1    Time In: 1109t  Time Out: 4187e  Total Billable Time: 43 minutes    Precautions: Standard    Subjective       Medical History:   No past medical history on file.    Surgical History:   Malika Curtis  has no past surgical history on file.    Medications:   Malika has a current medication list which includes the following prescription(s): albuterol, amoxicillin-clavulanate 875-125mg, bupropion, butalbital-acetaminophen-caffeine -40 mg, cetirizine, diazepam, meloxicam, montelukast, neomycin-polymyxin-hydrocortisone, and prednisone.    Allergies:   Review of patient's allergies indicates:   Allergen Reactions    Sulfa (sulfonamide antibiotics)         Date of onset: 2 months  History of current condition - Malika reports: About 2 months ago she started to have hip pain. She has had a pain and cortisone shot which helped the pain and then she started to compensate and everything else would start to hurt because of the pain. She started to take medication that has helped with the pain but she is still taking it and is unsure how she would do without it. Before the medicine she could not even sit in her car without the pain. She had to sit on a cushion to help with the pain. She reports that she does report that she gets some tingling in the front of  the knee. She denies N/T that goes down to her foot. She does report that she is feeling better but she still cannot sit on the floor. She tried to sit on the floor last night to fold clothes but had immediate pain and stopped. Her biggest worry right now is that she is creating other issues for herself. She would also like advice on how to adapt her everyday life.     Imaging, x-ray: per chart    FINDINGS:  Minimal mild levoscoliosis mid lumbar spine with elevation right hemipelvis.  Chronic periosteal reaction, hypertrophic change greater trochanters particularly on left with vague soft tissue density superior to left greater trochanter 1.1 cm.     Impression:     No fracture dislocation.  Additional findings above.    Prior Therapy: none  Social History: single story home lives with their family  Occupation: desk job  Prior Level of Function: independent  DME owned/used: none  Current Level of Function: sitting on the floor, walking or standing prolonged periods of time    Pain:  Current 1/10, worst 8/10, best 1/10   Location: left hip   Description: Throbbing and Sharp  Aggravating Factors: sitting on the floor, standing prolonged periods of time  Easing Factors: ice, medication    Pts goals: I want to be able to walking longer distances      Objective     Posture: Left lateral trunk lean    LUMBAR SPINE AROM:   Flexion: Min whittington   Extension: Min whittington pain radiating to her knee   Left Sidebend: WFL with some symptoms in knee   Right Sidebend: WFL   Left Rotation: WFL   Right Rotation: Mod whittington with pain in LLE     HIP AROM:      L  R    Flexion:    110 pain in lateral hip 110     Strength: Knee   Left Right   Quadriceps 4/5 5/5   Hamstrings 4+/5 5/5       Strength: Hip    Left Right   Iliopsoas 4/5 pain anterior thigh 5/5   Glute Med 4/5 4/5   IR 4/5 pain in knee and hip 4+/5   ER 4+/5 4+/5   Ext 3/5 4+/5     Dermatomes: Sensation: Light Touch: Intact  Flexibility: decrease piriformis flexibility bilaterally;  "Tight left ITB    Special Tests:    Left  Right    SLR  - -   Hip Scour  - -   FADIR - -   CAROLINE   + -   Lakesha + -   Sacral Thrust  - -       Joint Mobility: WNL  Palpation: TTP over greater trochanter; Glute max  Sensation: intact to light touch      Gait: Kirsten ambulated with none.  Level of Assistance: independent  Patient displays slight increase in trunk sway.         CMS Impairment/Limitation/Restriction for FOTO Hip Survey    Therapist reviewed FOTO scores for Malika Curtis on 5/12/2022.   FOTO documents entered into EPIC - see Media section.    Limitation Score: 42%          TREATMENT     Treatment Time In: 1132a  Treatment Time Out: 1148a  Total Treatment time separate from Evaluation: 16 minutes    Malika received therapeutic exercises to develop strength, ROM and flexibility for 16 minutes including:    Supine ITB str c/ Strap 30"x3 LLE  Supine hip flexor str c/ strap 30"x3  Seated Piriformis str 30"x3 LLE  Bridges 2x10    NV: Nustep, pallof press, dead bug, clams, prone hip ext, standing hip abduction     Home Exercises and Patient Education Provided    Education provided:   - HEP  - POC  - FDN      Written Home Exercises Provided: yes.  Exercises were reviewed and Malika was able to demonstrate them prior to the end of the session.  Malika demonstrated good  understanding of the education provided.     See EMR under Patient Instructions for exercises provided 5/12/2022.  .    ASSESSMENT       Malika is a 57 y.o. female referred to outpatient Physical Therapy with a medical diagnosis of Trochanteric bursitis of left hip. with signs and symptoms including: increased hip pain, decreased hip ROM, decreased LE Strength, soft tissue dysfunction, postural imbalance,impaired joint mobility, and decreased tolerance to functional activities. She displayed increase in pain/tightness with Lakesha and CAROLINE indicating ITB and piriformis tightness. Signs and symptoms consistent with medical diagnosis. Pt may " benefit from FDN at future sessions to decrease muscle tension.   Pt with good motivation to perform physical activity and responds well to cueing.  Pt prognosis is Good.  Pt will benefit from skilled outpatient physical therapy to address the above stated deficits, provide pt/family education and to maximize pt's level of independence.     Medical necessity is demonstrated by the following IMPAIRMENTS/PROBLEMS:  weakness and impaired muscle length    Plan of care discussed with patient: Yes  Pt's spiritual, cultural and educational needs considered and patient is agreeable to the plan of care and goals as stated below:     Anticipated Barriers for therapy: chronicity of condition     Medical Necessity is demonstrated by the following  History  Co-morbidities and personal factors that may impact the plan of care Co-morbidities:   high BMI    Personal Factors:   age  social background  lifestyle     moderate   Examination  Body Structures and Functions, activity limitations and participation restrictions that may impact the plan of care Body Regions:   back  lower extremities    Body Systems:    gross symmetry  ROM  strength  gait  transfers  transitions  motor control  motor learning    Participation Restrictions:   Sitting or standing for prolonged periods of time    Activity limitations:   Learning and applying knowledge  no deficits    General Tasks and Commands  no deficits    Communication  no deficits    Mobility  lifting and carrying objects  walking  sitting    Self care  no deficits    Domestic Life  shopping  doing house work (cleaning house, washing dishes, laundry)  assisting others    Interactions/Relationships  No deficits    Life Areas  No deficits    Community and Social Life  No deficits         moderate   Clinical Presentation stable and uncomplicated low   Decision Making/ Complexity Score: low       Short Term GOALS: 5 weeks. Pt agrees with goals set.  1. Patient demonstrates independence with  HEP.   2. Patient demonstrates independence with Postural Awareness.   3. Patient demonstrates independence with body mechanics.   4. Patient will report pain of </=6/10 at worst, on 0-10 pain scale, with all activity  5. Patient demonstrates ability to perform light activities around her home with no difficulty to improve tolerance to functional tasks pain free.   6. Patient demonstrates increased global hip strength to 4+/5 or greater to improve tolerance to functional activities pain free    Long Term GOALS: 10 weeks. Pt agrees with goals set.  1. Patient demonstrates ability to stand for 1 hour or greater with little to no difficulty to improve tolerance to functional tasks pain free  2. Patient demonstrates increased strength BLE's to 5/5 or greater to improve tolerance to functional activities pain free.   3. Patient demonstrates improved overall function per FOTO Hip Survey to 30% Limitation or less.   4. Patient will report pain of </=2/10 at worst, on 0-10 pain scale, with all activity  5. Patient demonstrates ability to sit on the floor to fold laundry with little to no difficulty to return to PLOF.     PLAN     Plan of care Certification: 5/12/2022 to 8/12/22.    Outpatient Physical Therapy 2 times weekly for 10 weeks to include the following interventions: Cervical/Lumbar Traction, Gait Training, Manual Therapy, Moist Heat/ Ice, Neuromuscular Re-ed, Patient Education, Therapeutic Activities, Therapeutic Exercise and FDN.  Pt may be seen by PTA as part of the rehabilitation team.     Jen Hamm, PT,DPT  5/12/2022    I have seen the patient, reviewed the therapist's plan of care, and I agree with the plan of care.      I certify the need for these services furnished under this plan of treatment and while under my care.     ___________________ ________ Physician/Referring Practitioner            ___________________________ Date of Signature      .

## 2022-05-23 ENCOUNTER — HOSPITAL ENCOUNTER (OUTPATIENT)
Dept: RADIOLOGY | Facility: HOSPITAL | Age: 57
Discharge: HOME | End: 2022-05-23
Attending: OBSTETRICS & GYNECOLOGY
Payer: COMMERCIAL

## 2022-05-23 VITALS — WEIGHT: 153.88 LBS | HEIGHT: 60 IN | BODY MASS INDEX: 30.21 KG/M2

## 2022-05-23 DIAGNOSIS — Z12.31 BREAST CANCER SCREENING BY MAMMOGRAM: ICD-10-CM

## 2022-05-23 DIAGNOSIS — N95.0 POSTMENOPAUSAL BLEEDING: ICD-10-CM

## 2022-05-23 PROCEDURE — 76830 US PELVIS COMP WITH TRANSVAG NON-OB (XPD): ICD-10-PCS | Mod: 26,,, | Performed by: RADIOLOGY

## 2022-05-23 PROCEDURE — 77063 MAMMO DIGITAL SCREENING BILAT WITH TOMO: ICD-10-PCS | Mod: 26,,, | Performed by: RADIOLOGY

## 2022-05-23 PROCEDURE — 76830 TRANSVAGINAL US NON-OB: CPT | Mod: TC

## 2022-05-23 PROCEDURE — 77067 MAMMO DIGITAL SCREENING BILAT WITH TOMO: ICD-10-PCS | Mod: 26,,, | Performed by: RADIOLOGY

## 2022-05-23 PROCEDURE — 76856 US PELVIS COMP WITH TRANSVAG NON-OB (XPD): ICD-10-PCS | Mod: 26,,, | Performed by: RADIOLOGY

## 2022-05-23 PROCEDURE — 76830 TRANSVAGINAL US NON-OB: CPT | Mod: 26,,, | Performed by: RADIOLOGY

## 2022-05-23 PROCEDURE — 77067 SCR MAMMO BI INCL CAD: CPT | Mod: TC

## 2022-05-23 PROCEDURE — 77063 BREAST TOMOSYNTHESIS BI: CPT | Mod: 26,,, | Performed by: RADIOLOGY

## 2022-05-23 PROCEDURE — 76856 US EXAM PELVIC COMPLETE: CPT | Mod: 26,,, | Performed by: RADIOLOGY

## 2022-05-23 PROCEDURE — 77067 SCR MAMMO BI INCL CAD: CPT | Mod: 26,,, | Performed by: RADIOLOGY

## 2022-05-30 NOTE — PROGRESS NOTES
"[facility]   Physical Therapy Treatment Note     Name: Malika Curtis  Clinic Number: [id number]    Therapy Diagnosis:   Encounter Diagnoses   Name Primary?    Decreased strength of lower extremity Yes    Decreased range of motion of left lower extremity     Postural imbalance      Physician: Khoa Goodman MD    Visit Date: 5/31/2022    Physician: Khoa Goodman MD     Physician Orders: PT Eval and Treat   Medical Diagnosis from Referral: M70.62 (ICD-10-CM) - Trochanteric bursitis of left hip  Evaluation Date: 5/12/2022  Authorization Period Expiration: 12/31/2022  Plan of Care Expiration: 8/12/22  Visit # / Visits authorized: 1/ 20        Precautions: Standard      PTA Visit #: 1/5     Time In: 0905  Time Out: 1000  Total Billable Time: 55 minutes    SUBJECTIVE     Pt reports: Pt states she has less hip pain since evaluation. She was able to perform HEP but piriformis stretch is the most challenging and she feels discomfort in the front of her hip. Pt states she uses ice after exercising to help with the pain.   She was compliant with home exercise program.  Response to previous treatment: initial evaluation  Functional change: none    Pain: 2/10  Location: left hip       OBJECTIVE     Objective Measures updated at progress report unless specified.     Treatment     Malika received therapeutic exercises to develop strength, ROM and flexibility for 45 minutes including:    +Nustep 6 min  +standing hip abduction 3x10  +palloff press GTB 20x each    Supine ITB str c/ Strap 30"x3 LLE  Supine hip flexor str c/ strap 30"x3  +supine hamstring stretch with strap LLE 30"x3  Seated Piriformis str 30"x3 LLE (performed supine today)  Bridges 2x10    +deadbug with PB 2x10  +SL clams RTB 3x10 each  +prone hip extension 2x10 each             manual therapy techniques: Soft tissue Mobilization were applied to the: L hip for 10 minutes, including:  STM L glutes and piriformis  STM " ITB              cold pack for 10 minutes to L hip in R sidelying position at end of session (not billed)        Patient Education and Home Exercises     Home Exercises Provided and Patient Education Provided     Education provided:   -     Written Home Exercises Provided: Patient instructed to cont prior HEP. Exercises were reviewed and Malika was able to demonstrate them prior to the end of the session.  Malika demonstrated good  understanding of the education provided. See EMR under Patient Instructions for exercises provided during therapy sessions    ASSESSMENT     Pt arrived to therapy with decreased pain since initial evaluation. Introduced additional exercises for hip and core strengthening per PT plan which pt tolerated well. Modified piriformis stretch to supine position with decreased discomfort in anterior hip and report of better stretching sensation in posterior/lateral hip therefore instructed to continue performing this way at home. She reports dead bugs to be the most challenging and did require cues for proper execution initially but demonstrated improvement with continuation. Manual techniques performed to decrease glute and piriformis tightness. Pt reports increased tenderness however admits improved tolerance with continuation.     Malika Is progressing well towards her goals.   Pt prognosis is Good.     Pt will continue to benefit from skilled outpatient physical therapy to address the deficits listed in the problem list box on initial evaluation, provide pt/family education and to maximize pt's level of independence in the home and community environment.     Pt's spiritual, cultural and educational needs considered and pt agreeable to plan of care and goals.     Anticipated barriers to physical therapy: chronicity of condition       Goals: Short Term GOALS: 5 weeks. Pt agrees with goals set.  1. Patient demonstrates independence with HEP.   2. Patient demonstrates independence with  Postural Awareness.   3. Patient demonstrates independence with body mechanics.   4. Patient will report pain of </=6/10 at worst, on 0-10 pain scale, with all activity  5. Patient demonstrates ability to perform light activities around her home with no difficulty to improve tolerance to functional tasks pain free.   6. Patient demonstrates increased global hip strength to 4+/5 or greater to improve tolerance to functional activities pain free     Long Term GOALS: 10 weeks. Pt agrees with goals set.  1. Patient demonstrates ability to stand for 1 hour or greater with little to no difficulty to improve tolerance to functional tasks pain free  2. Patient demonstrates increased strength BLE's to 5/5 or greater to improve tolerance to functional activities pain free.   3. Patient demonstrates improved overall function per FOTO Hip Survey to 30% Limitation or less.   4. Patient will report pain of </=2/10 at worst, on 0-10 pain scale, with all activity  5. Patient demonstrates ability to sit on the floor to fold laundry with little to no difficulty to return to PLOF.       PLAN     Plan of care Certification: 5/12/2022 to 8/12/22.      Outpatient Physical Therapy 2 times weekly for 10 weeks to include the following interventions: Cervical/Lumbar Traction, Gait Training, Manual Therapy, Moist Heat/ Ice, Neuromuscular Re-ed, Patient Education, Therapeutic Activities, Therapeutic Exercise and FDN.  Pt may be seen by PTA as part of the rehabilitation team.      Cont PT POC    Carlos Hernández PTA

## 2022-05-31 ENCOUNTER — CLINICAL SUPPORT (OUTPATIENT)
Dept: REHABILITATION | Facility: HOSPITAL | Age: 57
End: 2022-05-31
Attending: ORTHOPAEDIC SURGERY
Payer: COMMERCIAL

## 2022-05-31 DIAGNOSIS — R29.3 POSTURAL IMBALANCE: ICD-10-CM

## 2022-05-31 DIAGNOSIS — R29.898 DECREASED STRENGTH OF LOWER EXTREMITY: Primary | ICD-10-CM

## 2022-05-31 DIAGNOSIS — M25.662 DECREASED RANGE OF MOTION OF LEFT LOWER EXTREMITY: ICD-10-CM

## 2022-05-31 PROCEDURE — 97110 THERAPEUTIC EXERCISES: CPT | Mod: PN,CQ

## 2022-05-31 PROCEDURE — 97140 MANUAL THERAPY 1/> REGIONS: CPT | Mod: PN,CQ

## 2022-06-02 ENCOUNTER — CLINICAL SUPPORT (OUTPATIENT)
Dept: REHABILITATION | Facility: HOSPITAL | Age: 57
End: 2022-06-02
Attending: ORTHOPAEDIC SURGERY
Payer: COMMERCIAL

## 2022-06-02 DIAGNOSIS — R29.898 DECREASED STRENGTH OF LOWER EXTREMITY: Primary | ICD-10-CM

## 2022-06-02 DIAGNOSIS — R29.3 POSTURAL IMBALANCE: ICD-10-CM

## 2022-06-02 DIAGNOSIS — M25.662 DECREASED RANGE OF MOTION OF LEFT LOWER EXTREMITY: ICD-10-CM

## 2022-06-02 PROCEDURE — 97140 MANUAL THERAPY 1/> REGIONS: CPT | Mod: PN

## 2022-06-02 PROCEDURE — 97110 THERAPEUTIC EXERCISES: CPT | Mod: PN

## 2022-06-02 NOTE — PROGRESS NOTES
"[facility]   Physical Therapy Treatment Note     Name: Malika Curtis  Clinic Number: [id number]    Therapy Diagnosis:   Encounter Diagnoses   Name Primary?    Decreased strength of lower extremity Yes    Decreased range of motion of left lower extremity     Postural imbalance      Physician: Khoa Goodman MD    Visit Date: 6/2/2022    Physician: Khoa Goodman MD     Physician Orders: PT Eval and Treat   Medical Diagnosis from Referral: M70.62 (ICD-10-CM) - Trochanteric bursitis of left hip  Evaluation Date: 5/12/2022  Authorization Period Expiration: 12/31/2022  Plan of Care Expiration: 8/12/22  Visit # / Visits authorized: 2/ 20 (+eval)        Precautions: Standard      PTA Visit #: 0/5     Time In: 0705a  Time Out: 0810  Total Billable Time: 55 minutes    SUBJECTIVE     Pt reports: She had a lot of soreness about 2 hours after she got home after her last PT visit. She reports that she had to take Meloxicam. She reports that initially it was a good sore from the workout and then it just got more intense as time went on. She denies having pain right now. "I am conscious of the hip but I would not call it pain." Yesterday she was fine and she did not have to take any medication.   She was compliant with home exercise program.  Response to previous treatment: soreness  Functional change: none    Pain: 0/10  Location: left hip     OBJECTIVE     Objective Measures updated at progress report unless specified.     Treatment     Malika received therapeutic exercises to develop strength, ROM and flexibility for 45 minutes including:    Nustep 6 min  standing hip abduction 3x10  palloff press GTB 20x each    Supine ITB str c/ Strap 30"x3 LLE  Supine hip flexor str c/ strap LLE 30"x3  supine hamstring stretch with strap LLE 30"x3  Seated Piriformis str 30"x3 LLE (performed supine today)  Bridges 2x10    deadbug with PB 2x10  SL clams RTB 3x10 each (2 sets today)  prone hip extension " 2x10 each       manual therapy techniques: Soft tissue Mobilization were applied to the: L hip for 10 minutes, including:  STM L glutes c/ theraband roll and piriformis c/ passive IR/ER  STM ITB c/ theraband roll    cold pack for 10 minutes to L hip in R sidelying position at end of session (not billed)      Patient Education and Home Exercises     Home Exercises Provided and Patient Education Provided     Education provided:   -     Written Home Exercises Provided: Patient instructed to cont prior HEP. Exercises were reviewed and Malika was able to demonstrate them prior to the end of the session.  Malika demonstrated good  understanding of the education provided. See EMR under Patient Instructions for exercises provided during therapy sessions    ASSESSMENT     Pt arrived to today's session without reports of pain. Pt reported significant soreness after her last session and therefore PT did not progress exercises today. PT regressed clams to 2 sets to decrease pt soreness. She had good response to manual technique today. Pt tolerated all therex without increase in pain.     Malika Is progressing well towards her goals.   Pt prognosis is Good.     Pt will continue to benefit from skilled outpatient physical therapy to address the deficits listed in the problem list box on initial evaluation, provide pt/family education and to maximize pt's level of independence in the home and community environment.     Pt's spiritual, cultural and educational needs considered and pt agreeable to plan of care and goals.     Anticipated barriers to physical therapy: chronicity of condition       Goals: Short Term GOALS: 5 weeks. Pt agrees with goals set. progressing  1. Patient demonstrates independence with HEP.   2. Patient demonstrates independence with Postural Awareness.   3. Patient demonstrates independence with body mechanics.   4. Patient will report pain of </=6/10 at worst, on 0-10 pain scale, with all activity  5.  Patient demonstrates ability to perform light activities around her home with no difficulty to improve tolerance to functional tasks pain free.   6. Patient demonstrates increased global hip strength to 4+/5 or greater to improve tolerance to functional activities pain free     Long Term GOALS: 10 weeks. Pt agrees with goals set. progressing  1. Patient demonstrates ability to stand for 1 hour or greater with little to no difficulty to improve tolerance to functional tasks pain free  2. Patient demonstrates increased strength BLE's to 5/5 or greater to improve tolerance to functional activities pain free.   3. Patient demonstrates improved overall function per FOTO Hip Survey to 30% Limitation or less.   4. Patient will report pain of </=2/10 at worst, on 0-10 pain scale, with all activity  5. Patient demonstrates ability to sit on the floor to fold laundry with little to no difficulty to return to PLOF.       PLAN     Plan of care Certification: 5/12/2022 to 8/12/22.      Outpatient Physical Therapy 2 times weekly for 10 weeks to include the following interventions: Cervical/Lumbar Traction, Gait Training, Manual Therapy, Moist Heat/ Ice, Neuromuscular Re-ed, Patient Education, Therapeutic Activities, Therapeutic Exercise and FDN.  Pt may be seen by PTA as part of the rehabilitation team.      Cont PT POC    Jen Hamm, PT,DPT  06/02/2022

## 2022-06-07 ENCOUNTER — CLINICAL SUPPORT (OUTPATIENT)
Dept: REHABILITATION | Facility: HOSPITAL | Age: 57
End: 2022-06-07
Attending: ORTHOPAEDIC SURGERY
Payer: COMMERCIAL

## 2022-06-07 DIAGNOSIS — R29.3 POSTURAL IMBALANCE: ICD-10-CM

## 2022-06-07 DIAGNOSIS — R29.898 DECREASED STRENGTH OF LOWER EXTREMITY: Primary | ICD-10-CM

## 2022-06-07 DIAGNOSIS — M25.662 DECREASED RANGE OF MOTION OF LEFT LOWER EXTREMITY: ICD-10-CM

## 2022-06-07 PROCEDURE — 97110 THERAPEUTIC EXERCISES: CPT | Mod: PN,CQ

## 2022-06-07 PROCEDURE — 97140 MANUAL THERAPY 1/> REGIONS: CPT | Mod: PN,CQ

## 2022-06-07 NOTE — PROGRESS NOTES
"[facility]   Physical Therapy Treatment Note     Name: Malika Curtis  Clinic Number: [id number]    Therapy Diagnosis:   Encounter Diagnoses   Name Primary?    Decreased strength of lower extremity Yes    Decreased range of motion of left lower extremity     Postural imbalance      Physician: Khoa Goodman MD    Visit Date: 6/7/2022    Physician: Khoa Goodman MD     Physician Orders: PT Eval and Treat   Medical Diagnosis from Referral: M70.62 (ICD-10-CM) - Trochanteric bursitis of left hip  Evaluation Date: 5/12/2022  Authorization Period Expiration: 12/31/2022  Plan of Care Expiration: 8/12/22  Visit # / Visits authorized: 3/ 20 (+eval)        Precautions: Standard      PTA Visit #: 1/5     Time In: 1400PM  Time Out: 1510PM  Total Billable Time: 60 minutes    SUBJECTIVE     Pt reports: she's doing well today with not much pain. She was feeling much better after last session that she felt after her first one.     She was compliant with home exercise program.  Response to previous treatment: much better than after the previous session  Functional change: none    Pain: 1/10  Location: left hip     OBJECTIVE     Objective Measures updated at progress report unless specified.     Treatment     Malika received therapeutic exercises to develop strength, ROM and flexibility for 45 minutes including:    Nustep 6 min  Standing hip abduction 3x10  Paloff press GTB 20x each    Supine ITB str c/ Strap 30"x3 LLE  Supine hip flexor str c/ strap LLE 30"x3  Supine hamstring stretch with strap LLE 30"x3  Seated Piriformis str 30"x3 LLE (performed supine today)-NP    Bridges 2x10  Deadbug with PB 2x10  SL clams RTB 3x10 each (2 sets today)  Prone Hip extension 2x10 each       manual therapy techniques: Soft tissue Mobilization were applied to the: L hip for 10 minutes, including:  STM L glutes c/ theraband roll and piriformis c/ passive IR/ER  STM ITB c/ theraband roll    cold pack for 10 " minutes to L hip in R sidelying position at end of session (not billed)      Patient Education and Home Exercises     Home Exercises Provided and Patient Education Provided     Education provided:   - Continue HEP    Written Home Exercises Provided: Patient instructed to cont prior HEP. Exercises were reviewed and Malika was able to demonstrate them prior to the end of the session.  Malika demonstrated good  understanding of the education provided. See EMR under Patient Instructions for exercises provided during therapy sessions    ASSESSMENT     Malika tolerated the above tx session well with minimal c/o pain. Pt presents reporting decreased symptoms compared to previous session. Continued with therEx from previous session without issue. Pt had good response to MT as well. Will continue working towards established PT goals in future sessions.     Malika Is progressing well towards her goals.   Pt prognosis is Good.     Pt will continue to benefit from skilled outpatient physical therapy to address the deficits listed in the problem list box on initial evaluation, provide pt/family education and to maximize pt's level of independence in the home and community environment.     Pt's spiritual, cultural and educational needs considered and pt agreeable to plan of care and goals.     Anticipated barriers to physical therapy: chronicity of condition       Goals: Short Term GOALS: 5 weeks. Pt agrees with goals set. progressing  1. Patient demonstrates independence with HEP.   2. Patient demonstrates independence with Postural Awareness.   3. Patient demonstrates independence with body mechanics.   4. Patient will report pain of </=6/10 at worst, on 0-10 pain scale, with all activity  5. Patient demonstrates ability to perform light activities around her home with no difficulty to improve tolerance to functional tasks pain free.   6. Patient demonstrates increased global hip strength to 4+/5 or greater to improve  tolerance to functional activities pain free     Long Term GOALS: 10 weeks. Pt agrees with goals set. progressing  1. Patient demonstrates ability to stand for 1 hour or greater with little to no difficulty to improve tolerance to functional tasks pain free  2. Patient demonstrates increased strength BLE's to 5/5 or greater to improve tolerance to functional activities pain free.   3. Patient demonstrates improved overall function per FOTO Hip Survey to 30% Limitation or less.   4. Patient will report pain of </=2/10 at worst, on 0-10 pain scale, with all activity  5. Patient demonstrates ability to sit on the floor to fold laundry with little to no difficulty to return to PLOF.       PLAN     Plan of care Certification: 5/12/2022 to 8/12/22.      Outpatient Physical Therapy 2 times weekly for 10 weeks to include the following interventions: Cervical/Lumbar Traction, Gait Training, Manual Therapy, Moist Heat/ Ice, Neuromuscular Re-ed, Patient Education, Therapeutic Activities, Therapeutic Exercise and FDN.  Pt may be seen by PTA as part of the rehabilitation team.      Cont PT POC    Meli Mccarty PTA  06/07/2022

## 2022-06-09 NOTE — PROGRESS NOTES
"[facility]   Physical Therapy Treatment Note     Name: Malika Curtis  Clinic Number: [id number]    Therapy Diagnosis:   No diagnosis found.  Physician: Khoa Goodman MD    Visit Date: 6/10/2022    Physician: Khoa Goodman MD     Physician Orders: PT Eval and Treat   Medical Diagnosis from Referral: M70.62 (ICD-10-CM) - Trochanteric bursitis of left hip  Evaluation Date: 5/12/2022    PN due: 6/12/22  Authorization Period Expiration: 12/31/2022  Plan of Care Expiration: 8/12/22  Visit # / Visits authorized: 4/ 20 (+eval)        Precautions: Standard      PTA Visit #: 0/5     Time In: ***  Time Out: ***  Total Billable Time: *** minutes    SUBJECTIVE     Pt reports: ***she's doing well today with not much pain. She was feeling much better after last session that she felt after her first one.     She was compliant with home exercise program.  Response to previous treatment: much better than after the previous session  Functional change: none    Pain: 1/10  Location: left hip     OBJECTIVE     Objective Measures updated at progress report unless specified.     Treatment     Malika received therapeutic exercises to develop strength, ROM and flexibility for *** minutes including:    Nustep 6 min  Standing hip abduction 3x10  Paloff press GTB 20x each    Supine ITB str c/ Strap 30"x3 LLE  Supine hip flexor str c/ strap LLE 30"x3  Supine hamstring stretch with strap LLE 30"x3  Seated Piriformis str 30"x3 LLE (performed supine today)-NP    Bridges 2x10  Deadbug with PB 2x10  SL clams RTB 3x10 each (2 sets today)  Prone Hip extension 2x10 each       manual therapy techniques: Soft tissue Mobilization were applied to the: L hip for *** minutes, including:  STM L glutes c/ theraband roll and piriformis c/ passive IR/ER  STM ITB c/ theraband roll    cold pack for 10 minutes to L hip in R sidelying position at end of session (not billed)      Patient Education and Home Exercises     Home " Exercises Provided and Patient Education Provided     Education provided:   - Continue HEP    Written Home Exercises Provided: Patient instructed to cont prior HEP. Exercises were reviewed and Malika was able to demonstrate them prior to the end of the session.  Malika demonstrated good  understanding of the education provided. See EMR under Patient Instructions for exercises provided during therapy sessions    ASSESSMENT     ***Malika tolerated the above tx session well with minimal c/o pain. Pt presents reporting decreased symptoms compared to previous session. Continued with therEx from previous session without issue. Pt had good response to MT as well. Will continue working towards established PT goals in future sessions.     Malika Is progressing well towards her goals.   Pt prognosis is Good.     Pt will continue to benefit from skilled outpatient physical therapy to address the deficits listed in the problem list box on initial evaluation, provide pt/family education and to maximize pt's level of independence in the home and community environment.     Pt's spiritual, cultural and educational needs considered and pt agreeable to plan of care and goals.     Anticipated barriers to physical therapy: chronicity of condition       Goals: Short Term GOALS: 5 weeks. Pt agrees with goals set. progressing  1. Patient demonstrates independence with HEP.   2. Patient demonstrates independence with Postural Awareness.   3. Patient demonstrates independence with body mechanics.   4. Patient will report pain of </=6/10 at worst, on 0-10 pain scale, with all activity  5. Patient demonstrates ability to perform light activities around her home with no difficulty to improve tolerance to functional tasks pain free.   6. Patient demonstrates increased global hip strength to 4+/5 or greater to improve tolerance to functional activities pain free     Long Term GOALS: 10 weeks. Pt agrees with goals set. progressing  1.  Patient demonstrates ability to stand for 1 hour or greater with little to no difficulty to improve tolerance to functional tasks pain free  2. Patient demonstrates increased strength BLE's to 5/5 or greater to improve tolerance to functional activities pain free.   3. Patient demonstrates improved overall function per FOTO Hip Survey to 30% Limitation or less.   4. Patient will report pain of </=2/10 at worst, on 0-10 pain scale, with all activity  5. Patient demonstrates ability to sit on the floor to fold laundry with little to no difficulty to return to PLOF.       PLAN     Plan of care Certification: 5/12/2022 to 8/12/22.      Outpatient Physical Therapy 2 times weekly for 10 weeks to include the following interventions: Cervical/Lumbar Traction, Gait Training, Manual Therapy, Moist Heat/ Ice, Neuromuscular Re-ed, Patient Education, Therapeutic Activities, Therapeutic Exercise and FDN.  Pt may be seen by PTA as part of the rehabilitation team.      Cont PT POC    Jen Hamm, PT,DPT  06/09/2022

## 2022-06-10 ENCOUNTER — CLINICAL SUPPORT (OUTPATIENT)
Dept: REHABILITATION | Facility: HOSPITAL | Age: 57
End: 2022-06-10
Attending: ORTHOPAEDIC SURGERY
Payer: COMMERCIAL

## 2022-06-10 DIAGNOSIS — R29.3 POSTURAL IMBALANCE: ICD-10-CM

## 2022-06-10 DIAGNOSIS — M25.662 DECREASED RANGE OF MOTION OF LEFT LOWER EXTREMITY: ICD-10-CM

## 2022-06-10 DIAGNOSIS — R29.898 DECREASED STRENGTH OF LOWER EXTREMITY: Primary | ICD-10-CM

## 2022-06-10 PROCEDURE — 97110 THERAPEUTIC EXERCISES: CPT | Mod: PN

## 2022-06-10 PROCEDURE — 97140 MANUAL THERAPY 1/> REGIONS: CPT | Mod: PN

## 2022-06-10 NOTE — PROGRESS NOTES
"[facility]   Physical Therapy Treatment Note     Name: Malika Curtis  Clinic Number: [id number]    Therapy Diagnosis:   Encounter Diagnoses   Name Primary?    Decreased strength of lower extremity Yes    Decreased range of motion of left lower extremity     Postural imbalance      Physician: Khoa Goodman MD    Visit Date: 6/10/2022    Physician: Khoa Goodman MD     Physician Orders: PT Eval and Treat   Medical Diagnosis from Referral: M70.62 (ICD-10-CM) - Trochanteric bursitis of left hip  Evaluation Date: 5/12/2022  Authorization Period Expiration: 12/31/2022  Plan of Care Expiration: 8/12/22  Visit # / Visits authorized: 4/ 20 (+eval)  PTA Visit #: 0/5     Time In: 1:00PM  Time Out: 2:00PM  Total Billable Time: 60 minutes (1MT, 3TE)    Precautions: Standard  SUBJECTIVE     Pt reports: Pt reports that she mostly only feels it first thing in the morning. Then she will get up and move around and it starts to feel better.     She was compliant with home exercise program.  Response to previous treatment: doing well  Functional change: none    Pain: 1/10  Location: left hip     OBJECTIVE     Objective Measures updated at progress report unless specified.     2jt muscle test - + for reduced motion bilateral   Straight leg raise - negative for reproduction of hip pain + for hamstring tightness    Lumbar range of motion:  Flex 50% limited - no reproduction of pain  Ext - within normal limits no pain  Rotation - within normal limits no pain  Lateral flexion - within normal limits no apin     Treatment     Malika received therapeutic exercises to develop strength, ROM and flexibility for 45 minutes including:    Nustep 7 min level 4  Supine ITB str c/ Strap 30"x3 LLE  Supine hip flexor str c/ strap LLE 30"x3  Supine hamstring stretch with strap LLE 30"x3  Windshield wipers wide leg - x20  Standing hip abduction 3x10  Paloff press GTB 20x each  Bridges 2x10 5" hold c gait belt for " "iso hip abd   Deadbug with PB 2x10  SL clams RTB 2x10 3" holds bilateral   Prone Hip extension 3x10 each    Seated Piriformis str 30"x3 LLE (performed supine today)-NP    manual therapy techniques: Soft tissue Mobilization were applied to the: L hip for 10 minutes, including:  STM L glutes c/ theraband roll and piriformis c/ passive IR/ER  STM ITB c/ theraband roll    cold pack for 10 minutes to L hip in R sidelying position at end of session (not billed)      Patient Education and Home Exercises     Home Exercises Provided and Patient Education Provided     Education provided:   - Continue HEP    Written Home Exercises Provided: Patient instructed to cont prior HEP. Exercises were reviewed and Malika was able to demonstrate them prior to the end of the session.  Malika demonstrated good  understanding of the education provided. See EMR under Patient Instructions for exercises provided during therapy sessions    ASSESSMENT   Pt presents today with reduced hip pain. She continues to demonstrate limited hip external rotation due to discomfort at end range but strength overall has improved. More isometrics added today in order to strengthen the glutes without over tension of the tendons. Pt to continue to progress PRN.     Malika Is progressing well towards her goals.   Pt prognosis is Good.     Pt will continue to benefit from skilled outpatient physical therapy to address the deficits listed in the problem list box on initial evaluation, provide pt/family education and to maximize pt's level of independence in the home and community environment.     Pt's spiritual, cultural and educational needs considered and pt agreeable to plan of care and goals.     Anticipated barriers to physical therapy: chronicity of condition       Goals: Short Term GOALS: 5 weeks. Pt agrees with goals set. progressing  1. Patient demonstrates independence with HEP.   2. Patient demonstrates independence with Postural Awareness.   3. " Patient demonstrates independence with body mechanics.   4. Patient will report pain of </=6/10 at worst, on 0-10 pain scale, with all activity  5. Patient demonstrates ability to perform light activities around her home with no difficulty to improve tolerance to functional tasks pain free.   6. Patient demonstrates increased global hip strength to 4+/5 or greater to improve tolerance to functional activities pain free     Long Term GOALS: 10 weeks. Pt agrees with goals set. progressing  1. Patient demonstrates ability to stand for 1 hour or greater with little to no difficulty to improve tolerance to functional tasks pain free  2. Patient demonstrates increased strength BLE's to 5/5 or greater to improve tolerance to functional activities pain free.   3. Patient demonstrates improved overall function per FOTO Hip Survey to 30% Limitation or less.   4. Patient will report pain of </=2/10 at worst, on 0-10 pain scale, with all activity  5. Patient demonstrates ability to sit on the floor to fold laundry with little to no difficulty to return to PLOF.       PLAN     Plan of care Certification: 5/12/2022 to 8/12/22.      Outpatient Physical Therapy 2 times weekly for 10 weeks to include the following interventions: Cervical/Lumbar Traction, Gait Training, Manual Therapy, Moist Heat/ Ice, Neuromuscular Re-ed, Patient Education, Therapeutic Activities, Therapeutic Exercise and FDN.  Pt may be seen by PTA as part of the rehabilitation team.      Cont PT POC    Minal Solorzano, PT  06/10/2022

## 2022-06-14 ENCOUNTER — CLINICAL SUPPORT (OUTPATIENT)
Dept: REHABILITATION | Facility: HOSPITAL | Age: 57
End: 2022-06-14
Attending: ORTHOPAEDIC SURGERY
Payer: COMMERCIAL

## 2022-06-14 DIAGNOSIS — R29.898 DECREASED STRENGTH OF LOWER EXTREMITY: Primary | ICD-10-CM

## 2022-06-14 DIAGNOSIS — R29.3 POSTURAL IMBALANCE: ICD-10-CM

## 2022-06-14 DIAGNOSIS — M25.662 DECREASED RANGE OF MOTION OF LEFT LOWER EXTREMITY: ICD-10-CM

## 2022-06-14 PROCEDURE — 97140 MANUAL THERAPY 1/> REGIONS: CPT | Mod: PN

## 2022-06-14 PROCEDURE — 97110 THERAPEUTIC EXERCISES: CPT | Mod: PN

## 2022-06-14 NOTE — PROGRESS NOTES
[facility]   Physical Therapy Treatment Note     Name: Malika Curtis  Clinic Number: [id number]    Therapy Diagnosis:   Encounter Diagnoses   Name Primary?    Decreased strength of lower extremity Yes    Decreased range of motion of left lower extremity     Postural imbalance      Physician: Khoa Goodman MD    Visit Date: 6/14/2022    Physician: Khoa Goodman MD     Physician Orders: PT Eval and Treat   Medical Diagnosis from Referral: M70.62 (ICD-10-CM) - Trochanteric bursitis of left hip  Evaluation Date: 5/12/2022    PN DUE: 7/14/22  Authorization Period Expiration: 12/31/2022  Plan of Care Expiration: 8/12/22  Visit # / Visits authorized: 5/ 20 (+eval)  PTA Visit #: 0/5     Time In: 0900a  Time Out: 1000a  Total Billable Time: 60 minutes (1MT, 3TE)    Precautions: Standard  SUBJECTIVE     Pt reports:She has been feeling good. Yesterday was probably one of her worst days because she had to sit a lot for work.     She was compliant with home exercise program.  Response to previous treatment: doing well  Functional change: none    Pain: 1/10  Location: left hip     OBJECTIVE     Objective Measures updated at progress report unless specified.     6/14/22:  CMS Impairment/Limitation/Restriction for FOTO Hip Survey    Therapist reviewed FOTO scores for Malika Curtis on 6/14/2022.   FOTO documents entered into [employer] - see Media section.    Limitation Score: 41%       Lumbar range of motion:  Flex 50% limited - no reproduction of pain  Ext - within normal limits no pain  Rotation - within normal limits no pain  Lateral flexion - within normal limits no apin     Strength: Knee    Left Right   Quadriceps 5/5 5/5   Hamstrings 5/5 5/5         Strength: Hip     Left Right   Iliopsoas 4+/5  5/5   Glute Med 4+/5 4+/5   IR 4/5  4+/5   ER 4+/5 4+/5   Ext 4/5 4+/5        Treatment     Malika received therapeutic exercises to develop strength, ROM and flexibility for 50 minutes including  "reassessment:    Nustep 7 min level 4  Supine ITB str c/ Strap 30"x3 LLE  Supine hip flexor str c/ strap LLE 30"x3  Supine hamstring stretch with strap LLE 30"x3  +piriformis str 30"x3  Windshield wipers wide leg - x20  Standing hip abduction +YTB 2x10  Paloff press GTB 20x each  Bridges +3x10 5" hold c gait belt for iso hip abd   Deadbug with PB 2x10  SL clams RTB +3x10 3" holds bilateral   +Reverse clams RTB 2x10  Prone Hip extension 2x10 each +1#  +prone hip ext knee bend 2x10 ea    Seated Piriformis str 30"x3 LLE (performed supine today)-NP    manual therapy techniques: Soft tissue Mobilization were applied to the: L hip for 10 minutes, including:  STM L glutes c/ theraband roll and piriformis c/ passive IR/ER  STM ITB c/ theraband roll    cold pack for 10 minutes to L hip in R sidelying position at end of session (not billed)      Patient Education and Home Exercises     Home Exercises Provided and Patient Education Provided     Education provided:   - Continue HEP    Written Home Exercises Provided: Patient instructed to cont prior HEP. Exercises were reviewed and Malika was able to demonstrate them prior to the end of the session.  Malika demonstrated good  understanding of the education provided. See EMR under Patient Instructions for exercises provided during therapy sessions    ASSESSMENT   Pt was reassessed today and has met 4/5 STG and 1/5 LTG with improvements with independence with HEP, postural awareness, pain, and tolerance to light activities around her home. At this time she continues to have greatest difficulty with sitting for prolonged periods of time and residual glute and hip weakness. At this time she remains appropriate for therapy and would continue to benefit from skilled PT to improve strength and tissue extensibility.     PT progressed therex for continues glute and hip strengthening. She was appropriately challenged with progression and fatigue was achieved. PT also added back in " piriformis stretch without adverse effects. Pt continues to respond well to plan at this time.     Malika Is progressing well towards her goals.   Pt prognosis is Good.     Pt will continue to benefit from skilled outpatient physical therapy to address the deficits listed in the problem list box on initial evaluation, provide pt/family education and to maximize pt's level of independence in the home and community environment.     Pt's spiritual, cultural and educational needs considered and pt agreeable to plan of care and goals.     Anticipated barriers to physical therapy: chronicity of condition       Goals: Short Term GOALS: 5 weeks. Pt agrees with goals set. progressing  1. Patient demonstrates independence with HEP. met  2. Patient demonstrates independence with Postural Awareness. met  3. Patient demonstrates independence with body mechanics.   4. Patient will report pain of </=6/10 at worst, on 0-10 pain scale, with all activity. met  5. Patient demonstrates ability to perform light activities around her home with no difficulty to improve tolerance to functional tasks pain free. met  6. Patient demonstrates increased global hip strength to 4+/5 or greater to improve tolerance to functional activities pain free. Partially met     Long Term GOALS: 10 weeks. Pt agrees with goals set. progressing  1. Patient demonstrates ability to stand for 1 hour or greater with little to no difficulty to improve tolerance to functional tasks pain free  2. Patient demonstrates increased strength BLE's to 5/5 or greater to improve tolerance to functional activities pain free.   3. Patient demonstrates improved overall function per FOTO Hip Survey to 30% Limitation or less.   4. Patient will report pain of </=2/10 at worst, on 0-10 pain scale, with all activity met  5. Patient demonstrates ability to sit on the floor to fold laundry with little to no difficulty to return to PLOF.       PLAN     Plan of care Certification:  5/12/2022 to 8/12/22.      Outpatient Physical Therapy 2 times weekly for 10 weeks to include the following interventions: Cervical/Lumbar Traction, Gait Training, Manual Therapy, Moist Heat/ Ice, Neuromuscular Re-ed, Patient Education, Therapeutic Activities, Therapeutic Exercise and FDN.  Pt may be seen by PTA as part of the rehabilitation team.      Cont PT POC    Jen Hamm, PT,DPT  06/14/2022

## 2022-06-15 NOTE — PROGRESS NOTES
"[facility]   Physical Therapy Treatment Note     Name: Malika Curtis  Clinic Number: [id number]    Therapy Diagnosis:   Encounter Diagnoses   Name Primary?    Decreased strength of lower extremity Yes    Decreased range of motion of left lower extremity     Postural imbalance      Physician: Khoa Goodman MD    Visit Date: 6/16/2022    Physician: Khoa Goodman MD     Physician Orders: PT Eval and Treat   Medical Diagnosis from Referral: M70.62 (ICD-10-CM) - Trochanteric bursitis of left hip  Evaluation Date: 5/12/2022    PN DUE: 7/14/22  Authorization Period Expiration: 12/31/2022  Plan of Care Expiration: 8/12/22  Visit # / Visits authorized: 6/ 20 (+eval)  PTA Visit #: 1/5     Time In: 0900a  Time Out: 1000a  Total Billable Time: 60 minutes (1MT, 3TE)    Precautions: Standard  SUBJECTIVE     Pt reports: Pt states she had increased soreness after last visit and took a meloxicam. Pt states soreness eventually subsided. Pt states she did a lot of standing while cooking yesterday and had some increased pain afterwards. Pt states she is not really having pain this morning, just soreness.     She was compliant with home exercise program.  Response to previous treatment: increased soreness  Functional change: able to stand to cook without pain during cooking, just soreness afterwards    Pain: 1/10  Location: left hip     OBJECTIVE     Objective Measures updated at progress report unless specified.       Treatment     Malika received therapeutic exercises to develop strength, ROM and flexibility for 50 minutes including    Nustep 7 min level 4  Standing hip abduction YTB 2x10  +shuttle leg press 2 cords 3x10  Paloff press GTB 20x each  Supine ITB str c/ Strap 30"x3 LLE  Supine hip flexor str c/ strap LLE 30"x3  Supine hamstring stretch with strap LLE 30"x3  piriformis str 30"x3  Windshield wipers wide leg - x20  Bridges 3x10 5" hold c gait belt for iso hip abd   Deadbug with PB " "2x10  SL clams RTB 3x10 3" holds bilateral   Reverse clams 1# 2x10  Prone Hip extension 1# 2x10 each (laying on pillow)  prone hip ext knee bend +1# 2x10 ea (laying on pillow)        manual therapy techniques: Soft tissue Mobilization were applied to the: L hip for 10 minutes, including:  STM L glutes c/ theraband roll and piriformis c/ passive IR/ER  STM ITB c/ theraband roll  STM L glutes, piriformis  L piriformis release with passive hip IR/ER    cold pack for 10 minutes to L hip in R sidelying position at end of session (not billed)      Patient Education and Home Exercises     Home Exercises Provided and Patient Education Provided     Education provided:   - Continue HEP    Written Home Exercises Provided: Patient instructed to cont prior HEP. Exercises were reviewed and Malika was able to demonstrate them prior to the end of the session.  Malika demonstrated good  understanding of the education provided. See EMR under Patient Instructions for exercises provided during therapy sessions    ASSESSMENT      Pt arrived to therapy without significant pain symptoms and reports mild-moderate soreness in L hip. Introduced shuttle leg press today for additional glute strengthening which pt tolerated well. Pt admits fatigue with resisted standing hip abductions therefore did not progress. Instructed pt to lay on pillow for prone hip extensions secondary to excessive lumbar extension and provided cues for improved glute recruitment. Pt demonstrated improved form/execution and proper glute recruitment following positioning and cues. Continues with positive response to manual techniques and reports improved tolerance to piriformis stretch after manual release. Pt denies pain symptoms at end of session. Will continue progressing as tolerated.     Malika Is progressing well towards her goals.   Pt prognosis is Good.     Pt will continue to benefit from skilled outpatient physical therapy to address the deficits listed " in the problem list box on initial evaluation, provide pt/family education and to maximize pt's level of independence in the home and community environment.     Pt's spiritual, cultural and educational needs considered and pt agreeable to plan of care and goals.     Anticipated barriers to physical therapy: chronicity of condition       Goals: Short Term GOALS: 5 weeks. Pt agrees with goals set. progressing  1. Patient demonstrates independence with HEP. met  2. Patient demonstrates independence with Postural Awareness. met  3. Patient demonstrates independence with body mechanics.   4. Patient will report pain of </=6/10 at worst, on 0-10 pain scale, with all activity. met  5. Patient demonstrates ability to perform light activities around her home with no difficulty to improve tolerance to functional tasks pain free. met  6. Patient demonstrates increased global hip strength to 4+/5 or greater to improve tolerance to functional activities pain free. Partially met     Long Term GOALS: 10 weeks. Pt agrees with goals set. progressing  1. Patient demonstrates ability to stand for 1 hour or greater with little to no difficulty to improve tolerance to functional tasks pain free  2. Patient demonstrates increased strength BLE's to 5/5 or greater to improve tolerance to functional activities pain free.   3. Patient demonstrates improved overall function per FOTO Hip Survey to 30% Limitation or less.   4. Patient will report pain of </=2/10 at worst, on 0-10 pain scale, with all activity met  5. Patient demonstrates ability to sit on the floor to fold laundry with little to no difficulty to return to PLOF.       PLAN     Plan of care Certification: 5/12/2022 to 8/12/22.      Outpatient Physical Therapy 2 times weekly for 10 weeks to include the following interventions: Cervical/Lumbar Traction, Gait Training, Manual Therapy, Moist Heat/ Ice, Neuromuscular Re-ed, Patient Education, Therapeutic Activities, Therapeutic  Exercise and FDN.  Pt may be seen by PTA as part of the rehabilitation team.      Cont PT POC    Carlos Hernández, PTA  06/16/2022

## 2022-06-16 ENCOUNTER — CLINICAL SUPPORT (OUTPATIENT)
Dept: REHABILITATION | Facility: HOSPITAL | Age: 57
End: 2022-06-16
Attending: ORTHOPAEDIC SURGERY
Payer: COMMERCIAL

## 2022-06-16 DIAGNOSIS — R29.3 POSTURAL IMBALANCE: ICD-10-CM

## 2022-06-16 DIAGNOSIS — M25.662 DECREASED RANGE OF MOTION OF LEFT LOWER EXTREMITY: ICD-10-CM

## 2022-06-16 DIAGNOSIS — R29.898 DECREASED STRENGTH OF LOWER EXTREMITY: Primary | ICD-10-CM

## 2022-06-16 PROCEDURE — 97140 MANUAL THERAPY 1/> REGIONS: CPT | Mod: PN,CQ

## 2022-06-16 PROCEDURE — 97110 THERAPEUTIC EXERCISES: CPT | Mod: PN,CQ

## 2022-06-20 NOTE — PROGRESS NOTES
"[facility]   Physical Therapy Treatment Note     Name: Malika Curtis  Clinic Number: [id number]    Therapy Diagnosis:   Encounter Diagnoses   Name Primary?    Decreased strength of lower extremity Yes    Decreased range of motion of left lower extremity     Postural imbalance      Physician: Khoa Goodman MD    Visit Date: 6/21/2022    Physician: Khoa Goodman MD     Physician Orders: PT Eval and Treat   Medical Diagnosis from Referral: M70.62 (ICD-10-CM) - Trochanteric bursitis of left hip  Evaluation Date: 5/12/2022    PN DUE: 7/14/22  Authorization Period Expiration: 12/31/2022  Plan of Care Expiration: 8/12/22  Visit # / Visits authorized: 7/ 20 (+eval)  PTA Visit #: 0/5     Time In: 0902a  Time Out: 1008a  Total Billable Time: 56 minutes (1 MT, 3 TE)    Precautions: Standard  SUBJECTIVE     Pt reports:She over did it on Saturday and had an increase in pain that was more than just soreness. However, once she went to sleep and woke up she no longer had pain.     She was compliant with home exercise program.  Response to previous treatment: increased soreness  Functional change: able to stand to cook without pain during cooking, just soreness afterwards    Pain: 1/10  Location: left hip     OBJECTIVE     Objective Measures updated at progress report unless specified.       Treatment     Malika received therapeutic exercises to develop strength, ROM and flexibility for 46 minutes including    Nustep +8 min level 4  Standing hip abduction YTB 2x10  +shuttle leg press 2 cords 3x10  Paloff press GTB 20x each  Supine ITB str c/ Strap 30"x3 LLE  Supine hip flexor str c/ strap LLE 30"x3  Supine hamstring stretch with strap LLE 30"x3  piriformis str 30"x3  Windshield wipers wide leg - x20  Bridges RTB +hip abd 2x10  Deadbug with PB 2x10  SL clams RTB 3x10 3" holds bilateral   Reverse clams 1# 2x10  Prone Hip extension 1# 2x10 each   prone hip ext knee bend +1# 2x10 ea   +lateral " walk RTB @ knees 2 laps pole<>pole  +monster walk RTB @ knees 1 lap pole<>pole      manual therapy techniques: Soft tissue Mobilization were applied to the: L hip for 10 minutes, including:  STM L glutes c/ theraband roll and piriformis c/ passive IR/ER  STM ITB c/ theraband roll  STM L glutes, piriformis  L piriformis release with passive hip IR/ER    cold pack for 10 minutes to L hip in R sidelying position at end of session (not billed)      Patient Education and Home Exercises     Home Exercises Provided and Patient Education Provided     Education provided:   - Continue HEP    Written Home Exercises Provided: Patient instructed to cont prior HEP. Exercises were reviewed and Malika was able to demonstrate them prior to the end of the session.  Malika demonstrated good  understanding of the education provided. See EMR under Patient Instructions for exercises provided during therapy sessions    ASSESSMENT     Pt arrived to therapy without significant increase in left hip pain. She continues to have greatest challenge with hip extension strength and displays minimal ROM with prone hip extension. PT progressed hip strengthening with hip abd with bridges as well as with lateral and monster walks. No adverse effects noted with progressions. She continues to respond well to manual techniques at this time.     Malika Is progressing well towards her goals.   Pt prognosis is Good.     Pt will continue to benefit from skilled outpatient physical therapy to address the deficits listed in the problem list box on initial evaluation, provide pt/family education and to maximize pt's level of independence in the home and community environment.     Pt's spiritual, cultural and educational needs considered and pt agreeable to plan of care and goals.     Anticipated barriers to physical therapy: chronicity of condition       Goals: Short Term GOALS: 5 weeks. Pt agrees with goals set. progressing  1. Patient demonstrates  independence with HEP. met  2. Patient demonstrates independence with Postural Awareness. met  3. Patient demonstrates independence with body mechanics.   4. Patient will report pain of </=6/10 at worst, on 0-10 pain scale, with all activity. met  5. Patient demonstrates ability to perform light activities around her home with no difficulty to improve tolerance to functional tasks pain free. met  6. Patient demonstrates increased global hip strength to 4+/5 or greater to improve tolerance to functional activities pain free. Partially met     Long Term GOALS: 10 weeks. Pt agrees with goals set. progressing  1. Patient demonstrates ability to stand for 1 hour or greater with little to no difficulty to improve tolerance to functional tasks pain free  2. Patient demonstrates increased strength BLE's to 5/5 or greater to improve tolerance to functional activities pain free.   3. Patient demonstrates improved overall function per FOTO Hip Survey to 30% Limitation or less.   4. Patient will report pain of </=2/10 at worst, on 0-10 pain scale, with all activity met  5. Patient demonstrates ability to sit on the floor to fold laundry with little to no difficulty to return to PLOF.       PLAN     Plan of care Certification: 5/12/2022 to 8/12/22.      Outpatient Physical Therapy 2 times weekly for 10 weeks to include the following interventions: Cervical/Lumbar Traction, Gait Training, Manual Therapy, Moist Heat/ Ice, Neuromuscular Re-ed, Patient Education, Therapeutic Activities, Therapeutic Exercise and FDN.  Pt may be seen by PTA as part of the rehabilitation team.      Cont PT POC    Jen Hamm, PT,DPT  06/21/2022

## 2022-06-21 ENCOUNTER — CLINICAL SUPPORT (OUTPATIENT)
Dept: REHABILITATION | Facility: HOSPITAL | Age: 57
End: 2022-06-21
Attending: ORTHOPAEDIC SURGERY
Payer: COMMERCIAL

## 2022-06-21 DIAGNOSIS — R29.3 POSTURAL IMBALANCE: ICD-10-CM

## 2022-06-21 DIAGNOSIS — M25.662 DECREASED RANGE OF MOTION OF LEFT LOWER EXTREMITY: ICD-10-CM

## 2022-06-21 DIAGNOSIS — R29.898 DECREASED STRENGTH OF LOWER EXTREMITY: Primary | ICD-10-CM

## 2022-06-21 PROCEDURE — 97110 THERAPEUTIC EXERCISES: CPT | Mod: PN

## 2022-06-21 PROCEDURE — 97140 MANUAL THERAPY 1/> REGIONS: CPT | Mod: PN

## 2022-06-22 ENCOUNTER — TELEPHONE (OUTPATIENT)
Dept: OBSTETRICS AND GYNECOLOGY | Facility: CLINIC | Age: 57
End: 2022-06-22
Payer: COMMERCIAL

## 2022-06-22 DIAGNOSIS — R92.8 ABNORMALITY OF LEFT BREAST ON SCREENING MAMMOGRAM: Primary | ICD-10-CM

## 2022-06-22 NOTE — PROGRESS NOTES
"[facility]   Physical Therapy Treatment Note     Name: Malika Curtis  Clinic Number: [id number]    Therapy Diagnosis:   Encounter Diagnoses   Name Primary?    Decreased strength of lower extremity Yes    Decreased range of motion of left lower extremity     Postural imbalance      Physician: Khoa Goodman MD    Visit Date: 6/23/2022    Physician: Khoa Goodman MD     Physician Orders: PT Eval and Treat   Medical Diagnosis from Referral: M70.62 (ICD-10-CM) - Trochanteric bursitis of left hip  Evaluation Date: 5/12/2022    PN DUE: 7/14/22  Authorization Period Expiration: 12/31/2022  Plan of Care Expiration: 8/12/22  Visit # / Visits authorized: 8/ 20 (+eval)  PTA Visit #: 1/5     Time In: 0900  Time Out: 0955  Total Billable Time: 55 minutes (1 MT, 3 TE)    Precautions: Standard  SUBJECTIVE     Pt reports: Pt states she did a long walk and all of her exercises yesterday afternoon so had some increased soreness last night but woke up feeling ok this morning. Pt states she is not having real pain currently, just normal soreness.     She was compliant with home exercise program.  Response to previous treatment: increased soreness  Functional change: able to sit on the floor again    Pain: 1/10  Location: left hip     OBJECTIVE     Objective Measures updated at progress report unless specified.       Treatment     Malika received therapeutic exercises to develop strength, ROM and flexibility for 45 minutes including    Nustep +8 min level 4  Standing hip abduction YTB 2x10  shuttle leg press 2.5 cords 3x10  +single leg shuttle leg press 1 cord 2x10  Paloff press GTB 20x each  Supine ITB str c/ Strap 30"x3 LLE  Supine hip flexor str c/ strap LLE 30"x3  Supine hamstring stretch with strap LLE 30"x3  piriformis str 30"x3  Windshield wipers wide leg - x20  Bridges RTB +hip abd 2x10  Deadbug with PB 2x10  SL clams RTB 3x10 3" holds bilateral   Reverse clams 1# 2x10  Prone Hip extension " 1# 2x10 each   prone hip ext knee bend 1# 2x10 ea   lateral walk RTB @ knees 2 laps pole<>pole  monster walk RTB @ knees 1 lap pole<>pole  +seated hip IR/ER RTB 2x10      manual therapy techniques: Soft tissue Mobilization were applied to the: L hip for 10 minutes, including:  STM L glutes c/ theraband roll and piriformis c/ passive IR/ER  STM ITB c/ theraband roll  STM L glutes, piriformis  L piriformis release with passive hip IR/ER    cold pack for 10 minutes to L hip in R sidelying position at end of session (not billed)      Patient Education and Home Exercises     Home Exercises Provided and Patient Education Provided     Education provided:   - Continue HEP    Written Home Exercises Provided: Patient instructed to cont prior HEP. Exercises were reviewed and Malika was able to demonstrate them prior to the end of the session.  Malika demonstrated good  understanding of the education provided. See EMR under Patient Instructions for exercises provided during therapy sessions    ASSESSMENT     Pt arrived to therapy with continued soreness but denies significant hip pain today. Resumed shuttle leg press and introduced single leg press today which was difficult for patient but she was able to complete without onset of pain symptoms. Also performed seated hip IR/ER today with RTB- pt admits increased difficulty with ER compared to IR but tolerated well otherwise. Continued manual techniques with positive response reported by patient. Will continue strengthening and progressions as tolerated.     [place] Is progressing well towards her goals.   Pt prognosis is Good.     Pt will continue to benefit from skilled outpatient physical therapy to address the deficits listed in the problem list box on initial evaluation, provide pt/family education and to maximize pt's level of independence in the home and community environment.     Pt's spiritual, cultural and educational needs considered and pt agreeable to plan of  care and goals.     Anticipated barriers to physical therapy: chronicity of condition       Goals: Short Term GOALS: 5 weeks. Pt agrees with goals set. progressing  1. Patient demonstrates independence with HEP. met  2. Patient demonstrates independence with Postural Awareness. met  3. Patient demonstrates independence with body mechanics.   4. Patient will report pain of </=6/10 at worst, on 0-10 pain scale, with all activity. met  5. Patient demonstrates ability to perform light activities around her home with no difficulty to improve tolerance to functional tasks pain free. met  6. Patient demonstrates increased global hip strength to 4+/5 or greater to improve tolerance to functional activities pain free. Partially met     Long Term GOALS: 10 weeks. Pt agrees with goals set. progressing  1. Patient demonstrates ability to stand for 1 hour or greater with little to no difficulty to improve tolerance to functional tasks pain free  2. Patient demonstrates increased strength BLE's to 5/5 or greater to improve tolerance to functional activities pain free.   3. Patient demonstrates improved overall function per FOTO Hip Survey to 30% Limitation or less.   4. Patient will report pain of </=2/10 at worst, on 0-10 pain scale, with all activity met  5. Patient demonstrates ability to sit on the floor to fold laundry with little to no difficulty to return to PLOF.       PLAN     Plan of care Certification: 5/12/2022 to 8/12/22.      Outpatient Physical Therapy 2 times weekly for 10 weeks to include the following interventions: Cervical/Lumbar Traction, Gait Training, Manual Therapy, Moist Heat/ Ice, Neuromuscular Re-ed, Patient Education, Therapeutic Activities, Therapeutic Exercise and FDN.  Pt may be seen by PTA as part of the rehabilitation team.      Cont PT POC    Carlos Hernández PTA  06/23/2022

## 2022-06-23 ENCOUNTER — CLINICAL SUPPORT (OUTPATIENT)
Dept: REHABILITATION | Facility: HOSPITAL | Age: 57
End: 2022-06-23
Attending: ORTHOPAEDIC SURGERY
Payer: COMMERCIAL

## 2022-06-23 DIAGNOSIS — M25.662 DECREASED RANGE OF MOTION OF LEFT LOWER EXTREMITY: ICD-10-CM

## 2022-06-23 DIAGNOSIS — R29.3 POSTURAL IMBALANCE: ICD-10-CM

## 2022-06-23 DIAGNOSIS — R29.898 DECREASED STRENGTH OF LOWER EXTREMITY: Primary | ICD-10-CM

## 2022-06-23 PROCEDURE — 97110 THERAPEUTIC EXERCISES: CPT | Mod: PN,CQ

## 2022-06-23 PROCEDURE — 97140 MANUAL THERAPY 1/> REGIONS: CPT | Mod: PN,CQ

## 2022-06-24 ENCOUNTER — PROCEDURE VISIT (OUTPATIENT)
Dept: OBSTETRICS AND GYNECOLOGY | Facility: CLINIC | Age: 57
End: 2022-06-24
Payer: COMMERCIAL

## 2022-06-24 VITALS
SYSTOLIC BLOOD PRESSURE: 108 MMHG | DIASTOLIC BLOOD PRESSURE: 78 MMHG | WEIGHT: 151.13 LBS | BODY MASS INDEX: 29.51 KG/M2

## 2022-06-24 DIAGNOSIS — N95.0 POSTMENOPAUSAL BLEEDING: Primary | ICD-10-CM

## 2022-06-24 PROCEDURE — 88305 TISSUE EXAM BY PATHOLOGIST: ICD-10-PCS | Mod: 26,,, | Performed by: PATHOLOGY

## 2022-06-24 PROCEDURE — 88305 TISSUE EXAM BY PATHOLOGIST: CPT | Performed by: PATHOLOGY

## 2022-06-24 PROCEDURE — 58100 BIOPSY OF UTERUS LINING: CPT | Mod: S$GLB,,, | Performed by: OBSTETRICS & GYNECOLOGY

## 2022-06-24 PROCEDURE — 88305 TISSUE EXAM BY PATHOLOGIST: CPT | Mod: 26,,, | Performed by: PATHOLOGY

## 2022-06-24 PROCEDURE — 58100 ENDOMETRIAL BIOPSY: ICD-10-PCS | Mod: S$GLB,,, | Performed by: OBSTETRICS & GYNECOLOGY

## 2022-06-24 PROCEDURE — 99499 UNLISTED E&M SERVICE: CPT | Mod: S$GLB,,, | Performed by: OBSTETRICS & GYNECOLOGY

## 2022-06-24 PROCEDURE — 99499 NO LOS: ICD-10-PCS | Mod: S$GLB,,, | Performed by: OBSTETRICS & GYNECOLOGY

## 2022-06-24 NOTE — PROCEDURES
Endometrial biopsy    Date/Time: 6/24/2022 10:40 AM  Performed by: Vitaliy Hernandez MD  Authorized by: Vitaliy Hernandez MD     Consent:     Consent obtained:  Written    Consent given by:  Patient    Patient questions answered: yes      Patient agrees, verbalizes understanding, and wants to proceed: yes      Educational handouts given: no      Instructions and paperwork completed: yes    Indication:     Indications: Post-menopausal bleeding      Chronicity of post-menopausal bleeding:  New    Progression of post-menopausal bleeding:  Resolved  Pre-procedure:     Pre-procedure timeout performed: yes    Procedure:     Procedure: endometrial biopsy with Pipelle      Cervix cleaned and prepped: yes      A paracervical block was performed: no      An intracervical block was performed: no      The cervix was dilated: no      Uterus sounded: yes      Uterus sound depth (cm):  8    Curettes used:  1    Specimen collected: specimen collected and sent to pathology      Patient tolerated procedure well with no complications: yes    Comments:     Procedure comments:  Minimal tissue retrieved    Assessment/Plan  1. Postmenopausal bleeding      EMBx tissue sent for pathology, low suspicion for hyperplasia

## 2022-06-28 ENCOUNTER — DOCUMENTATION ONLY (OUTPATIENT)
Dept: REHABILITATION | Facility: HOSPITAL | Age: 57
End: 2022-06-28
Payer: COMMERCIAL

## 2022-06-28 LAB
FINAL PATHOLOGIC DIAGNOSIS: NORMAL
GROSS: NORMAL
Lab: NORMAL

## 2022-06-28 NOTE — PROGRESS NOTES
Physical therapist and physical therapy assistant(s) met face to face to discuss patient's treatment plan and progress towards established goals. Pt will be seen by a physical therapist minimally every 6th visit or every 30 days.    SRINIVAS Carnes, PT, DPT

## 2022-06-29 ENCOUNTER — CLINICAL SUPPORT (OUTPATIENT)
Dept: REHABILITATION | Facility: HOSPITAL | Age: 57
End: 2022-06-29
Attending: ORTHOPAEDIC SURGERY
Payer: COMMERCIAL

## 2022-06-29 DIAGNOSIS — M25.662 DECREASED RANGE OF MOTION OF LEFT LOWER EXTREMITY: ICD-10-CM

## 2022-06-29 DIAGNOSIS — R29.898 DECREASED STRENGTH OF LOWER EXTREMITY: Primary | ICD-10-CM

## 2022-06-29 DIAGNOSIS — R29.3 POSTURAL IMBALANCE: ICD-10-CM

## 2022-06-29 PROCEDURE — 97110 THERAPEUTIC EXERCISES: CPT | Mod: PN,CQ

## 2022-06-29 PROCEDURE — 97140 MANUAL THERAPY 1/> REGIONS: CPT | Mod: PN,CQ

## 2022-06-29 NOTE — PROGRESS NOTES
"[facility]   Physical Therapy Treatment Note     Name: Malika Curtis  Clinic Number: [id number]    Therapy Diagnosis:   Encounter Diagnoses   Name Primary?    Decreased strength of lower extremity Yes    Decreased range of motion of left lower extremity     Postural imbalance      Physician: Khoa Goodman MD    Visit Date: 6/29/2022    Physician: Khoa Goodman MD     Physician Orders: PT Eval and Treat   Medical Diagnosis from Referral: M70.62 (ICD-10-CM) - Trochanteric bursitis of left hip  Evaluation Date: 5/12/2022    PN DUE: 7/14/22  Authorization Period Expiration: 12/31/2022  Plan of Care Expiration: 8/12/22  Visit # / Visits authorized: 9/ 20 (+eval)  PTA Visit #: 2/5     Time In: 835  Time Out: 0935  Total Billable Time: 60 minutes (1 MT, 3 TE)    Precautions: Standard  SUBJECTIVE     Pt reports: Pt states she has been feeling good with little to no hip pain. Pt states she still feels limited in the amount of walking she can do and would like to be able to walk more. Pt states she would like to be able to hit more than 5,000 steps without experiencing increased hip pain.     She was compliant with home exercise program.  Response to previous treatment: good, decreased pain  Functional change: able to sit on the floor again    Pain: .5/10  Location: left hip     OBJECTIVE     Objective Measures updated at progress report unless specified.       Treatment     Malika received therapeutic exercises to develop strength, ROM and flexibility for 50 minutes including    Nustep +8 min level 4  +Recumbent bike 6min level 4  Standing hip abduction YTB 2x10  shuttle leg press 2.5 cords 3x10  single leg shuttle leg press 1 cord 2x10 (1.5 NV)  +shuttle kick back 1cord 2x10 each  Bridges +GTB +hip abd 2x10  Deadbug with PB 2x10  SL clams +GTB 3x10 3" holds bilateral   Reverse clams 1# +3x10  prone hip ext knee bend 1# 2x10 ea   lateral walk RTB @ knees 2 laps pole<>pole (GTB " "NV)  monster walk RTB @ knees 1 lap pole<>pole (GTBNV)  seated hip IR/ER RTB 2x10    Paloff press GTB 20x each  Supine ITB str c/ Strap 30"x3 LLE  Supine hip flexor str c/ strap LLE 30"x3  Supine hamstring stretch with strap LLE 30"x3  piriformis str 30"x3  Windshield wipers wide leg - x20    manual therapy techniques: Soft tissue Mobilization were applied to the: L hip for 10 minutes, including:  STM L glutes c/ theraband roll and piriformis c/ passive IR/ER  STM ITB c/ theraband roll  STM L glutes, piriformis  L piriformis release with passive hip IR/ER    cold pack for 10 minutes to L hip in R sidelying position at end of session (not billed)      Patient Education and Home Exercises     Home Exercises Provided and Patient Education Provided     Education provided:   - Issued/reviewed updated HEP to include all strengthening exercises performed in therapy.     Written Home Exercises Provided: yes. Exercises were reviewed and Malika was able to demonstrate them prior to the end of the session.  Malika demonstrated good  understanding of the education provided. See EMR under Patient Instructions for exercises provided during therapy sessions    ASSESSMENT     Pt continues to report improved pain symptoms indicating positive response to therapy. She was able to tolerate increased reps/resistance with various exercises today and admits increased soreness/fatigue but denies significant pain symptoms. Introduced shuttle kick backs for additional glute strengthening today. Pt required some cues for proper execution/form but demonstrated improvement after cues. Updated HEP today to include all strengthening exercises performed in therapy. Will begin TM next visit to assess time/distance of ambulation and improve endurance.     Malika Is progressing well towards her goals.   Pt prognosis is Good.     Pt will continue to benefit from skilled outpatient physical therapy to address the deficits listed in the problem " list box on initial evaluation, provide pt/family education and to maximize pt's level of independence in the home and community environment.     Pt's spiritual, cultural and educational needs considered and pt agreeable to plan of care and goals.     Anticipated barriers to physical therapy: chronicity of condition       Goals: Short Term GOALS: 5 weeks. Pt agrees with goals set. progressing  1. Patient demonstrates independence with HEP. met  2. Patient demonstrates independence with Postural Awareness. met  3. Patient demonstrates independence with body mechanics.   4. Patient will report pain of </=6/10 at worst, on 0-10 pain scale, with all activity. met  5. Patient demonstrates ability to perform light activities around her home with no difficulty to improve tolerance to functional tasks pain free. met  6. Patient demonstrates increased global hip strength to 4+/5 or greater to improve tolerance to functional activities pain free. Partially met     Long Term GOALS: 10 weeks. Pt agrees with goals set. progressing  1. Patient demonstrates ability to stand for 1 hour or greater with little to no difficulty to improve tolerance to functional tasks pain free  2. Patient demonstrates increased strength BLE's to 5/5 or greater to improve tolerance to functional activities pain free.   3. Patient demonstrates improved overall function per FOTO Hip Survey to 30% Limitation or less.   4. Patient will report pain of </=2/10 at worst, on 0-10 pain scale, with all activity met  5. Patient demonstrates ability to sit on the floor to fold laundry with little to no difficulty to return to PLOF.       PLAN     Plan of care Certification: 5/12/2022 to 8/12/22.      Outpatient Physical Therapy 2 times weekly for 10 weeks to include the following interventions: Cervical/Lumbar Traction, Gait Training, Manual Therapy, Moist Heat/ Ice, Neuromuscular Re-ed, Patient Education, Therapeutic Activities, Therapeutic Exercise and FDN.  Pt  may be seen by PTA as part of the rehabilitation team.      Cont PT POC, try TM next visit if symptoms remain managed.     Carlos Hernández, PTA  06/29/2022

## 2022-07-06 ENCOUNTER — CLINICAL SUPPORT (OUTPATIENT)
Dept: REHABILITATION | Facility: HOSPITAL | Age: 57
End: 2022-07-06
Attending: ORTHOPAEDIC SURGERY
Payer: COMMERCIAL

## 2022-07-06 DIAGNOSIS — M25.662 DECREASED RANGE OF MOTION OF LEFT LOWER EXTREMITY: ICD-10-CM

## 2022-07-06 DIAGNOSIS — R29.3 POSTURAL IMBALANCE: ICD-10-CM

## 2022-07-06 DIAGNOSIS — R29.898 DECREASED STRENGTH OF LOWER EXTREMITY: Primary | ICD-10-CM

## 2022-07-06 PROCEDURE — 97110 THERAPEUTIC EXERCISES: CPT | Mod: PN,CQ

## 2022-07-06 PROCEDURE — 97140 MANUAL THERAPY 1/> REGIONS: CPT | Mod: PN,CQ

## 2022-07-06 NOTE — PROGRESS NOTES
"[facility]   Physical Therapy Treatment Note     Name: Malika Curtis  Clinic Number: [id number]    Therapy Diagnosis:   Encounter Diagnoses   Name Primary?    Decreased strength of lower extremity Yes    Decreased range of motion of left lower extremity     Postural imbalance      Physician: Khoa Goodman MD    Visit Date: 7/6/2022    Physician: Khoa Goodman MD     Physician Orders: PT Eval and Treat   Medical Diagnosis from Referral: M70.62 (ICD-10-CM) - Trochanteric bursitis of left hip  Evaluation Date: 5/12/2022    PN DUE: 7/14/22  Authorization Period Expiration: 12/31/2022  Plan of Care Expiration: 8/12/22  Visit # / Visits authorized: 10/ 20 (+eval)  PTA Visit #: 3/5     Time In: 335  Time Out: 0435  Total Billable Time: 60 minutes (1 MT, 3 TE)    Precautions: Standard  SUBJECTIVE     Pt reports: Pt states she experienced increased pain after being in the pool on Sunday. Pt states she took Meloxicam the past few days and it has helped. Pt states she is only having a little pain today.   She was compliant with home exercise program.  Response to previous treatment: some soreness   Functional change: able to sit on the floor again    Pain: 1/10  Location: left hip     OBJECTIVE     Objective Measures updated at progress report unless specified.       Treatment     Malika received therapeutic exercises to develop strength, ROM and flexibility for 50 minutes including    Nustep +8 min level 4  Recumbent bike 6min level 4  +TM ambulation, spd 1.7, 10min  Standing hip abduction YTB 2x10  shuttle leg press +3 cords 3x10  single leg shuttle leg press +1.5 cord 2x10   shuttle kick back 1cord 2x10 each  Bridges GTB +hip abd 2x10  Deadbug with PB 2x10  SL clams GTB 3x10 3" holds bilateral   Reverse clams 1# 3x10  prone hip ext knee bend 1# 2x10 ea   lateral walk RTB @ knees 2 laps pole<>pole (GTB NV)  monster walk RTB @ knees 1 lap pole<>pole (GTB NV)  seated hip IR/ER RTB 2x10 " "(increase reps NV)    Paloff press GTB 20x each  Supine ITB str c/ Strap 30"x3 LLE  Supine hip flexor str c/ strap LLE 30"x3  Supine hamstring stretch with strap LLE 30"x3  piriformis str 30"x3  Windshield wipers wide leg - x20    manual therapy techniques: Soft tissue Mobilization were applied to the: L hip for 10 minutes, including:  STM L glutes c/ theraband roll and piriformis c/ passive IR/ER  STM ITB c/ theraband roll  STM L glutes, piriformis  L piriformis release with passive hip IR/ER    cold pack for 10 minutes to L hip in R sidelying position at end of session (not billed)      Patient Education and Home Exercises     Home Exercises Provided and Patient Education Provided     Education provided:   - instructed to slowly integrate walking for distance and monitoring symptom response. Instructed to begin at .5 mile and progress from there as tolerated.     Written Home Exercises Provided: yes. Exercises were reviewed and Malika was able to demonstrate them prior to the end of the session.  Malika demonstrated good  understanding of the education provided. See EMR under Patient Instructions for exercises provided during therapy sessions    ASSESSMENT     Introduced TM ambulation today and pt was able to ambulate for 10 min without further exacerbation of symptoms indicating good tolerance.  Pt also able to tolerate increased resistance with single and double shuttle leg press today without complaints. Decreased soft tissue restrictions noted in piriformis and gluteal musculature today indicating positive response to manual techniques performed throughout therapy sessions thus far. Will continue progressions as tolerated next visit     Malika Is progressing well towards her goals.   Pt prognosis is Good.     Pt will continue to benefit from skilled outpatient physical therapy to address the deficits listed in the problem list box on initial evaluation, provide pt/family education and to maximize pt's " level of independence in the home and community environment.     Pt's spiritual, cultural and educational needs considered and pt agreeable to plan of care and goals.     Anticipated barriers to physical therapy: chronicity of condition       Goals: Short Term GOALS: 5 weeks. Pt agrees with goals set. progressing  1. Patient demonstrates independence with HEP. met  2. Patient demonstrates independence with Postural Awareness. met  3. Patient demonstrates independence with body mechanics.   4. Patient will report pain of </=6/10 at worst, on 0-10 pain scale, with all activity. met  5. Patient demonstrates ability to perform light activities around her home with no difficulty to improve tolerance to functional tasks pain free. met  6. Patient demonstrates increased global hip strength to 4+/5 or greater to improve tolerance to functional activities pain free. Partially met     Long Term GOALS: 10 weeks. Pt agrees with goals set. progressing  1. Patient demonstrates ability to stand for 1 hour or greater with little to no difficulty to improve tolerance to functional tasks pain free  2. Patient demonstrates increased strength BLE's to 5/5 or greater to improve tolerance to functional activities pain free.   3. Patient demonstrates improved overall function per FOTO Hip Survey to 30% Limitation or less.   4. Patient will report pain of </=2/10 at worst, on 0-10 pain scale, with all activity met  5. Patient demonstrates ability to sit on the floor to fold laundry with little to no difficulty to return to PLOF.       PLAN     Plan of care Certification: 5/12/2022 to 8/12/22.      Outpatient Physical Therapy 2 times weekly for 10 weeks to include the following interventions: Cervical/Lumbar Traction, Gait Training, Manual Therapy, Moist Heat/ Ice, Neuromuscular Re-ed, Patient Education, Therapeutic Activities, Therapeutic Exercise and FDN.  Pt may be seen by PTA as part of the rehabilitation team.      Cont PT POC    Carlos  Edgar, [facility]  07/06/2022

## 2022-07-07 NOTE — PROGRESS NOTES
"[facility]   Physical Therapy Treatment Note     Name: Malika Curtis  Clinic Number: [id number]    Therapy Diagnosis:   Encounter Diagnoses   Name Primary?    Decreased strength of lower extremity Yes    Decreased range of motion of left lower extremity     Postural imbalance      Physician: Khoa Goodman MD    Visit Date: 7/8/2022    Physician: Khoa Goodman MD     Physician Orders: PT Eval and Treat   Medical Diagnosis from Referral: M70.62 (ICD-10-CM) - Trochanteric bursitis of left hip  Evaluation Date: 5/12/2022    PN DUE: 7/14/22  Authorization Period Expiration: 12/31/2022  Plan of Care Expiration: 8/12/22  Visit # / Visits authorized: 11/ 20 (+eval)  PTA Visit #: 4/5     Time In: 0805   Time Out: 0905  Total Billable Time: 60 minutes (4 TE)    Precautions: Standard  SUBJECTIVE     Pt reports: Pt states she feels a little stiff today but is not having any significant pain. Pt states she had about 6,0000 steps yesterday which is more than usual without having pain.   She was compliant with home exercise program.  Response to previous treatment: soreness   Functional change: able to sit on the floor again    Pain: 1/10  Location: left hip     OBJECTIVE     Objective Measures updated at progress report unless specified.       Treatment     Malika received therapeutic exercises to develop strength, ROM and flexibility for 55 minutes including    Nustep +8 min level 4  Recumbent bike 6min level 4  TM ambulation, spd 1.7, 10min    shuttle leg press 3 cords 3x10  single leg shuttle leg press +1.5 cord 2x10   shuttle kick back 1cord 2x10 each  matrix hip abduction 35# 2x10  +medx core rotation 20# 2x10 each  +medx lumbar extension 40# 3x10  Bridges GTB +hip abd 2x10  Deadbug with PB 2x10  SL clams GTB 3x10 3" holds bilateral   Reverse clams 1# 3x10  prone hip ext knee bend +2# 2x10 ea   lateral walk +GTB @ knees 2 laps pole<>pole   monster walk +GTB @ knees 1 lap pole<>pole " "  seated hip IR/ER +GTB +3x10       Standing hip abduction YTB 2x10  Paloff press GTB 20x each  Supine ITB str c/ Strap 30"x3 LLE  Supine hip flexor str c/ strap LLE 30"x3  Supine hamstring stretch with strap LLE 30"x3  piriformis str 30"x3  [facility] wipers wide leg - x20    manual therapy techniques: Soft tissue Mobilization were applied to the: L hip for 05 minutes, including:  STM L glutes c/ theraband roll and piriformis c/ passive IR/ER  STM ITB c/ theraband roll  STM L glutes, piriformis  L piriformis release with passive hip IR/ER    cold pack for 10 minutes to L hip in R sidelying position at end of session (not billed)      Patient Education and Home Exercises     Home Exercises Provided and Patient Education Provided     Education provided:   - instructed to slowly integrate walking for distance and monitoring symptom response. Instructed to begin at .5 mile and progress from there as tolerated.     Written Home Exercises Provided: Patient instructed to cont prior HEP. Exercises were reviewed and Malika was able to demonstrate them prior to the end of the session.  Malika demonstrated good  understanding of the education provided. See EMR under Patient Instructions for exercises provided during therapy sessions    ASSESSMENT     Continued with TM ambulation with good tolerance. Introduced medx core rotation and lumbar extension for additional core strengthening today and pt performed without complaints. Pt able to tolerate increased resistance with various exercises as well without complaints of pain but admits appropriate fatigue. Improved tissue pliability noted in piriformis and glutes therefore less time focused on manual intervention. Pt will be re-assessed by primary PT next visit.     Malika Is progressing well towards her goals.   Pt prognosis is Good.     Pt will continue to benefit from skilled outpatient physical therapy to address the deficits listed in the problem list box on initial " evaluation, provide pt/family education and to maximize pt's level of independence in the home and community environment.     Pt's spiritual, cultural and educational needs considered and pt agreeable to plan of care and goals.     Anticipated barriers to physical therapy: chronicity of condition       Goals: Short Term GOALS: 5 weeks. Pt agrees with goals set. progressing  1. Patient demonstrates independence with HEP. met  2. Patient demonstrates independence with Postural Awareness. met  3. Patient demonstrates independence with body mechanics.   4. Patient will report pain of </=6/10 at worst, on 0-10 pain scale, with all activity. met  5. Patient demonstrates ability to perform light activities around her home with no difficulty to improve tolerance to functional tasks pain free. met  6. Patient demonstrates increased global hip strength to 4+/5 or greater to improve tolerance to functional activities pain free. Partially met     Long Term GOALS: 10 weeks. Pt agrees with goals set. progressing  1. Patient demonstrates ability to stand for 1 hour or greater with little to no difficulty to improve tolerance to functional tasks pain free  2. Patient demonstrates increased strength BLE's to 5/5 or greater to improve tolerance to functional activities pain free.   3. Patient demonstrates improved overall function per FOTO Hip Survey to 30% Limitation or less.   4. Patient will report pain of </=2/10 at worst, on 0-10 pain scale, with all activity met  5. Patient demonstrates ability to sit on the floor to fold laundry with little to no difficulty to return to PLOF.       PLAN     Plan of care Certification: 5/12/2022 to 8/12/22.      Outpatient Physical Therapy 2 times weekly for 10 weeks to include the following interventions: Cervical/Lumbar Traction, Gait Training, Manual Therapy, Moist Heat/ Ice, Neuromuscular Re-ed, Patient Education, Therapeutic Activities, Therapeutic Exercise and FDN.  Pt may be seen by PTA  as part of the rehabilitation team.      Cont PT POC    Carlos Hernández, PTA  07/08/2022

## 2022-07-08 ENCOUNTER — CLINICAL SUPPORT (OUTPATIENT)
Dept: REHABILITATION | Facility: HOSPITAL | Age: 57
End: 2022-07-08
Attending: ORTHOPAEDIC SURGERY
Payer: COMMERCIAL

## 2022-07-08 DIAGNOSIS — M25.662 DECREASED RANGE OF MOTION OF LEFT LOWER EXTREMITY: ICD-10-CM

## 2022-07-08 DIAGNOSIS — R29.898 DECREASED STRENGTH OF LOWER EXTREMITY: Primary | ICD-10-CM

## 2022-07-08 DIAGNOSIS — R29.3 POSTURAL IMBALANCE: ICD-10-CM

## 2022-07-08 PROCEDURE — 97110 THERAPEUTIC EXERCISES: CPT | Mod: PN,CQ

## 2022-07-12 NOTE — PROGRESS NOTES
[facility]   Physical Therapy Treatment Note     Name: Malika Curtis  Clinic Number: [id number]    Therapy Diagnosis:   No diagnosis found.  Physician: Khoa Goodman MD    Visit Date: 7/13/2022    Physician: Khoa Goodman MD     Physician Orders: PT Eval and Treat   Medical Diagnosis from Referral: M70.62 (ICD-10-CM) - Trochanteric bursitis of left hip  Evaluation Date: 5/12/2022    PN DUE: 7/14/22  Authorization Period Expiration: 12/31/2022  Plan of Care Expiration: 8/12/22  Visit # / Visits authorized: 12/ 20 (+eval)  PTA Visit #: 0/5     Time In: ***  Time Out: ***  Total Billable Time: *** minutes (*** TE)    Precautions: Standard  SUBJECTIVE     Pt reports: ***Pt states she feels a little stiff today but is not having any significant pain. Pt states she had about 6,0000 steps yesterday which is more than usual without having pain.   She was compliant with home exercise program.  Response to previous treatment: soreness   Functional change: able to sit on the floor again    Pain: 1/10  Location: left hip     OBJECTIVE     Objective Measures updated at progress report unless specified.   7/12/22    CMS Impairment/Limitation/Restriction for FOTO Hip Survey     Therapist reviewed FOTO scores for Malika Curtis on 6/14/2022.   FOTO documents entered into [employer] - see Media section.     Limitation Score: ***%         Lumbar range of motion:  Flex 50% limited - no reproduction of pain  Ext - within normal limits no pain  Rotation - within normal limits no pain  Lateral flexion - within normal limits no apin      Strength: Knee    Left Right   Quadriceps 5/5 5/5   Hamstrings 5/5 5/5         Strength: Hip     Left Right   Iliopsoas 4+/5  5/5   Glute Med 4+/5 4+/5   IR 4/5  4+/5   ER 4+/5 4+/5   Ext 4/5 4+/5        Treatment     Malika received therapeutic exercises to develop strength, ROM and flexibility for *** minutes including    Nustep +8 min level 4  Recumbent bike 6min level  "4  TM ambulation, spd 1.7, 10min    shuttle leg press 3 cords 3x10  single leg shuttle leg press +1.5 cord 2x10   shuttle kick back 1cord 2x10 each  matrix hip abduction 35# 2x10  +medx core rotation 20# 2x10 each  +medx lumbar extension 40# 3x10  Bridges GTB +hip abd 2x10  Deadbug with PB 2x10  SL clams GTB 3x10 3" holds bilateral   Reverse clams 1# 3x10  prone hip ext knee bend +2# 2x10 ea   lateral walk +GTB @ knees 2 laps pole<>pole   monster walk +GTB @ knees 1 lap pole<>pole   seated hip IR/ER +GTB +3x10       Standing hip abduction YTB 2x10  Paloff press GTB 20x each  Supine ITB str c/ Strap 30"x3 LLE  Supine hip flexor str c/ strap LLE 30"x3  Supine hamstring stretch with strap LLE 30"x3  piriformis str 30"x3  Windshield wipers wide leg - x20    manual therapy techniques: Soft tissue Mobilization were applied to the: L hip for *** minutes, including:  STM L glutes c/ theraband roll and piriformis c/ passive IR/ER  STM ITB c/ theraband roll  STM L glutes, piriformis  L piriformis release with passive hip IR/ER    cold pack for 10 minutes to L hip in R sidelying position at end of session (not billed)      Patient Education and Home Exercises     Home Exercises Provided and Patient Education Provided     Education provided:   - instructed to slowly integrate walking for distance and monitoring symptom response. Instructed to begin at .5 mile and progress from there as tolerated.     Written Home Exercises Provided: Patient instructed to cont prior HEP. Exercises were reviewed and Malika was able to demonstrate them prior to the end of the session.  Malika demonstrated good  understanding of the education provided. See EMR under Patient Instructions for exercises provided during therapy sessions    ASSESSMENT     ***Continued with TM ambulation with good tolerance. Introduced medx core rotation and lumbar extension for additional core strengthening today and pt performed without complaints. Pt able to " tolerate increased resistance with various exercises as well without complaints of pain but admits appropriate fatigue. Improved tissue pliability noted in piriformis and glutes therefore less time focused on manual intervention. Pt will be re-assessed by primary PT next visit.     Malika Is progressing well towards her goals.   Pt prognosis is Good.     Pt will continue to benefit from skilled outpatient physical therapy to address the deficits listed in the problem list box on initial evaluation, provide pt/family education and to maximize pt's level of independence in the home and community environment.     Pt's spiritual, cultural and educational needs considered and pt agreeable to plan of care and goals.     Anticipated barriers to physical therapy: chronicity of condition       Goals: Short Term GOALS: 5 weeks. Pt agrees with goals set. progressing  1. Patient demonstrates independence with HEP. met  2. Patient demonstrates independence with Postural Awareness. met  3. Patient demonstrates independence with body mechanics.   4. Patient will report pain of </=6/10 at worst, on 0-10 pain scale, with all activity. met  5. Patient demonstrates ability to perform light activities around her home with no difficulty to improve tolerance to functional tasks pain free. met  6. Patient demonstrates increased global hip strength to 4+/5 or greater to improve tolerance to functional activities pain free. Partially met     Long Term GOALS: 10 weeks. Pt agrees with goals set. progressing  1. Patient demonstrates ability to stand for 1 hour or greater with little to no difficulty to improve tolerance to functional tasks pain free  2. Patient demonstrates increased strength BLE's to 5/5 or greater to improve tolerance to functional activities pain free.   3. Patient demonstrates improved overall function per FOTO Hip Survey to 30% Limitation or less.   4. Patient will report pain of </=2/10 at worst, on 0-10 pain scale,  with all activity met  5. Patient demonstrates ability to sit on the floor to fold laundry with little to no difficulty to return to PLOF.       PLAN     Plan of care Certification: 5/12/2022 to 8/12/22.      Outpatient Physical Therapy 2 times weekly for 10 weeks to include the following interventions: Cervical/Lumbar Traction, Gait Training, Manual Therapy, Moist Heat/ Ice, Neuromuscular Re-ed, Patient Education, Therapeutic Activities, Therapeutic Exercise and FDN.  Pt may be seen by PTA as part of the rehabilitation team.      Cont PT POC    Jen Hamm, PT,DPT  07/12/2022

## 2022-07-13 ENCOUNTER — CLINICAL SUPPORT (OUTPATIENT)
Dept: REHABILITATION | Facility: HOSPITAL | Age: 57
End: 2022-07-13
Attending: ORTHOPAEDIC SURGERY
Payer: COMMERCIAL

## 2022-07-13 DIAGNOSIS — R29.3 POSTURAL IMBALANCE: ICD-10-CM

## 2022-07-13 DIAGNOSIS — M25.662 DECREASED RANGE OF MOTION OF LEFT LOWER EXTREMITY: ICD-10-CM

## 2022-07-13 DIAGNOSIS — R29.898 DECREASED STRENGTH OF LOWER EXTREMITY: Primary | ICD-10-CM

## 2022-07-13 PROCEDURE — 97110 THERAPEUTIC EXERCISES: CPT | Mod: PN

## 2022-07-13 NOTE — PROGRESS NOTES
[facility]   Physical Therapy Treatment Note     Name: Malika Curtis  Clinic Number: [id number]    Therapy Diagnosis:   Encounter Diagnoses   Name Primary?    Decreased strength of lower extremity Yes    Decreased range of motion of left lower extremity     Postural imbalance      Physician: Khoa Goodman MD    Visit Date: 2022    Physician: Khoa Goodman MD     Physician Orders: PT Eval and Treat   Medical Diagnosis from Referral: M70.62 (ICD-10-CM) - Trochanteric bursitis of left hip  Evaluation Date: 2022    PN DUE: at DC  Authorization Period Expiration: 2022  Plan of Care Expiration: 22  Visit # / Visits authorized:  (+eval)  PTA Visit #: 0/5     Time In: 0937  Time Out: 010:30  Total Billable Time: 53 minutes (4 TE)    Precautions: Standard  SUBJECTIVE     Pt reports: Pt reports that pain wise she is good. She has some stiffness because she had to spend the night in the hospital due to her dad having a stroke.  She was compliant with home exercise program.  Response to previous treatment: soreness   Functional change: no change    Pain: 0/10  Location: left hip     OBJECTIVE     Objective Measures updated at progress report unless specified.     Walking Lo/8 0.3 miles - 10min    2.75 miles - 26min   7/10 2.72 miles - 22min    2.34 miles    3.34 miles - 30min  13 .37 miles 10min      Lumbar range of motion:  Flex 50% limited - no reproduction of pain  Ext - within normal limits no pain  Rotation - Slight pull in ant hip c left rotation.   Lateral flexion - within normal limits no apin      Strength: Knee    Left Right   Quadriceps 5/5 5/5   Hamstrings 5/5 5/5         Strength: Hip     Left Right   Iliopsoas 5/5  5/5   Glute Med 4+/5 ant fibers, 4/5 post fibers 5/5   IR 4+/5  4+/5   ER 4+/5 4+/5   Ext 4+/5 4+/5      range of motion: Hip internal rotation/ER pain free    + 2 jt muscle test hamstrings - bilateral   + Kole Test  "bilateral    Treatment     Malika received therapeutic exercises to develop strength, ROM and flexibility for 53 minutes including tests and measures above:    Nustep +8 min level 4  Recumbent bike 6min level 4  TM ambulation, spd 1.7, 10min    shuttle leg press 3.5 cords 3x10  single leg shuttle leg press +1.5 cord 2x10   shuttle kick back 1cord 2x10 each  matrix hip abduction 35# 2x10  medx core rotation 20# 2x10 each  medx lumbar extension 40# 3x10  Bridges GTB +hip abd 2x10  Deadbug with PB 2x10  Reverse clams 2# 3x10  Clamshell holds - [id number]" bilateral   prone hip ext knee bend 2# 2x10 ea   lateral walk +GTB @ knees 2 laps pole<>pole   monster walk +GTB @ knees 1 lap pole<>pole   seated hip IR/ER +GTB +3x10       Standing hip abduction YTB 2x10  Paloff press GTB 20x each  Supine ITB str c/ Strap 30"x3 LLE  Supine hip flexor str c/ strap LLE 30"x3  Supine hamstring stretch with strap LLE 30"x3  piriformis str 30"x3  Windshield wipers wide leg - x20    manual therapy techniques: Soft tissue Mobilization were applied to the: L hip for 00 minutes, including:  STM L glutes c/ theraband roll and piriformis c/ passive IR/ER  STM ITB c/ theraband roll  STM L glutes, piriformis  L piriformis release with passive hip IR/ER    cold pack for 00 minutes to L hip in R sidelying position at end of session (not billed)      Patient Education and Home Exercises     Home Exercises Provided and Patient Education Provided     Education provided:   -pt educated on reducing PT to 1x/week due to progress   - Pt educated on ankle weight to purchase at home.     Written Home Exercises Provided: Patient instructed to cont prior HEP. Exercises were reviewed and Malika was able to demonstrate them prior to the end of the session.  Malika demonstrated good  understanding of the education provided. See EMR under Patient Instructions for exercises provided during therapy sessions    ASSESSMENT     Pt presents to PT today with " reducing pain and improved functions. Based on assessment above, pt has continued limitation in glute med on the left. No pain that she was having previously but continued weakness. Pt would benefit from a few more weeks of supervised PT to ensure proper performance of exercises that will continue to improve hip strength and reduce lateral hip pain.     Malika Is progressing well towards her goals.   Pt prognosis is Good.     Pt will continue to benefit from skilled outpatient physical therapy to address the deficits listed in the problem list box on initial evaluation, provide pt/family education and to maximize pt's level of independence in the home and community environment.     Pt's spiritual, cultural and educational needs considered and pt agreeable to plan of care and goals.     Anticipated barriers to physical therapy: chronicity of condition       Goals: Short Term GOALS: 5 weeks. Pt agrees with goals set. progressing  1. Patient demonstrates independence with HEP. met  2. Patient demonstrates independence with Postural Awareness. met  3. Patient demonstrates independence with body mechanics.   4. Patient will report pain of </=6/10 at worst, on 0-10 pain scale, with all activity. met  5. Patient demonstrates ability to perform light activities around her home with no difficulty to improve tolerance to functional tasks pain free. met  6. Patient demonstrates increased global hip strength to 4+/5 or greater to improve tolerance to functional activities pain free. Partially met     Long Term GOALS: 10 weeks. Pt agrees with goals set. progressing  1. Patient demonstrates ability to stand for 1 hour or greater with little to no difficulty to improve tolerance to functional tasks pain free  2. Patient demonstrates increased strength BLE's to 5/5 or greater to improve tolerance to functional activities pain free.   3. Patient demonstrates improved overall function per FOTO Hip Survey to 30% Limitation or less.    4. Patient will report pain of </=2/10 at worst, on 0-10 pain scale, with all activity met  5. Patient demonstrates ability to sit on the floor to fold laundry with little to no difficulty to return to PLOF.       PLAN     Plan of care Certification: 5/12/2022 to 8/12/22.      Outpatient Physical Therapy 2 times weekly for 10 weeks to include the following interventions: Cervical/Lumbar Traction, Gait Training, Manual Therapy, Moist Heat/ Ice, Neuromuscular Re-ed, Patient Education, Therapeutic Activities, Therapeutic Exercise and FDN.  Pt may be seen by PTA as part of the rehabilitation team.      Cont PT POC    Minal Solorzano, PT  07/13/2022

## 2022-07-18 ENCOUNTER — HOSPITAL ENCOUNTER (OUTPATIENT)
Dept: RADIOLOGY | Facility: HOSPITAL | Age: 57
Discharge: HOME | End: 2022-07-18
Attending: OBSTETRICS & GYNECOLOGY
Payer: COMMERCIAL

## 2022-07-18 DIAGNOSIS — R92.8 ABNORMALITY OF LEFT BREAST ON SCREENING MAMMOGRAM: ICD-10-CM

## 2022-07-18 PROCEDURE — 77065 DX MAMMO INCL CAD UNI: CPT | Mod: 26,LT,, | Performed by: RADIOLOGY

## 2022-07-18 PROCEDURE — 77065 MAMMO DIGITAL DIAGNOSTIC LEFT WITH TOMO: ICD-10-PCS | Mod: 26,LT,, | Performed by: RADIOLOGY

## 2022-07-18 PROCEDURE — 76642 ULTRASOUND BREAST LIMITED: CPT | Mod: 26,LT,, | Performed by: RADIOLOGY

## 2022-07-18 PROCEDURE — 77061 MAMMO DIGITAL DIAGNOSTIC LEFT WITH TOMO: ICD-10-PCS | Mod: 26,LT,, | Performed by: RADIOLOGY

## 2022-07-18 PROCEDURE — 77061 BREAST TOMOSYNTHESIS UNI: CPT | Mod: 26,LT,, | Performed by: RADIOLOGY

## 2022-07-18 PROCEDURE — 77065 DX MAMMO INCL CAD UNI: CPT | Mod: TC,LT

## 2022-07-18 PROCEDURE — 76642 US BREAST LEFT LIMITED: ICD-10-PCS | Mod: 26,LT,, | Performed by: RADIOLOGY

## 2022-07-18 PROCEDURE — 76642 ULTRASOUND BREAST LIMITED: CPT | Mod: TC,LT

## 2022-07-19 NOTE — PROGRESS NOTES
"[facility]   Physical Therapy Treatment Note     Name: Malika Curtis  Clinic Number: [id number]    Therapy Diagnosis:   Encounter Diagnoses   Name Primary?    Decreased strength of lower extremity Yes    Decreased range of motion of left lower extremity     Postural imbalance      Physician: Khoa Goodman MD    Visit Date: 2022    Physician: Khoa Goodman MD     Physician Orders: PT Eval and Treat   Medical Diagnosis from Referral: M70.62 (ICD-10-CM) - Trochanteric bursitis of left hip  Evaluation Date: 2022    PN DUE: at DC  Authorization Period Expiration: 2022  Plan of Care Expiration: 22  Visit # / Visits authorized:  (+eval)  PTA Visit #:      Time In: 940  Time Out: 0  Total Billable Time: 60 minutes (4 TE)    Precautions: Standard  SUBJECTIVE     Pt reports: Pt states she has been spending more time sitting at the hospital with her dad so feels some increased tightness/discomfort. Pt states the pain is not that bad but it has been better overall. Pt states she performed exercises everyday besides one day last week.   She was compliant with home exercise program.  Response to previous treatment: soreness   Functional change: no change    Pain: 1/10  Location: left hip     OBJECTIVE     Objective Measures updated at progress report unless specified.     Walking Lo/13 .37 miles 10min     .37 miles in 10min      Treatment     Malika received therapeutic exercises to develop strength, ROM and flexibility for 55 minutes:      TM ambulation, spd 1.7, 10min     shuttle leg press 3 cords 3x10  single leg shuttle leg press 1.5 cord +3x10   shuttle kick back 1cord 2x10 each  matrix hip abduction 35# +3x10  medx core rotation 20# +3x10 each  medx lumbar extension 40# 3x10    Bridges GTB with hip abd 2x10  Deadbug with PB 2x10  SL clams GTB 3x10 3" holds bilateral   Reverse clams 1# 3x10  prone hip ext knee bend +2# 2x10 ea   lateral walk " GTB @ knees 2 laps pole<>pole   monster walk GTB @ knees 1 lap pole<>pole   seated hip IR/ER GTB +3x10         manual therapy techniques: Soft tissue Mobilization were applied to the: L hip for 05 minutes, including:  STM L glutes c/ theraband roll and piriformis c/ passive IR/ER  STM ITB c/ theraband roll  STM L glutes, piriformis  L piriformis release with passive hip IR/ER    cold pack for 00 minutes to L hip in R sidelying position at end of session (not billed)      Patient Education and Home Exercises     Home Exercises Provided and Patient Education Provided     Education provided:   -    Written Home Exercises Provided: Patient instructed to cont prior HEP. Exercises were reviewed and Malika was able to demonstrate them prior to the end of the session.  Malika demonstrated good  understanding of the education provided. See EMR under Patient Instructions for exercises provided during therapy sessions    ASSESSMENT     Pt arrived to therapy with report of increased posterior hip tightness today which improved following STM and manual release. Able to perform all exercises, including some increased reps with various exercises, without complaints. Will continue progressing as tolerated.     Malika Is progressing well towards her goals.   Pt prognosis is Good.     Pt will continue to benefit from skilled outpatient physical therapy to address the deficits listed in the problem list box on initial evaluation, provide pt/family education and to maximize pt's level of independence in the home and community environment.     Pt's spiritual, cultural and educational needs considered and pt agreeable to plan of care and goals.     Anticipated barriers to physical therapy: chronicity of condition       Goals: Short Term GOALS: 5 weeks. Pt agrees with goals set. progressing  1. Patient demonstrates independence with HEP. met  2. Patient demonstrates independence with Postural Awareness. met  3. Patient demonstrates  independence with body mechanics.   4. Patient will report pain of </=6/10 at worst, on 0-10 pain scale, with all activity. met  5. Patient demonstrates ability to perform light activities around her home with no difficulty to improve tolerance to functional tasks pain free. met  6. Patient demonstrates increased global hip strength to 4+/5 or greater to improve tolerance to functional activities pain free. Partially met     Long Term GOALS: 10 weeks. Pt agrees with goals set. progressing  1. Patient demonstrates ability to stand for 1 hour or greater with little to no difficulty to improve tolerance to functional tasks pain free  2. Patient demonstrates increased strength BLE's to 5/5 or greater to improve tolerance to functional activities pain free.   3. Patient demonstrates improved overall function per FOTO Hip Survey to 30% Limitation or less.   4. Patient will report pain of </=2/10 at worst, on 0-10 pain scale, with all activity met  5. Patient demonstrates ability to sit on the floor to fold laundry with little to no difficulty to return to PLOF.       PLAN     Plan of care Certification: 5/12/2022 to 8/12/22.      Outpatient Physical Therapy 2 times weekly for 10 weeks to include the following interventions: Cervical/Lumbar Traction, Gait Training, Manual Therapy, Moist Heat/ Ice, Neuromuscular Re-ed, Patient Education, Therapeutic Activities, Therapeutic Exercise and FDN.  Pt may be seen by PTA as part of the rehabilitation team.      Cont PT POC    Carlos Hernández, SRINIVAS  07/20/2022

## 2022-07-20 ENCOUNTER — CLINICAL SUPPORT (OUTPATIENT)
Dept: REHABILITATION | Facility: HOSPITAL | Age: 57
End: 2022-07-20
Attending: ORTHOPAEDIC SURGERY
Payer: COMMERCIAL

## 2022-07-20 DIAGNOSIS — R29.898 DECREASED STRENGTH OF LOWER EXTREMITY: Primary | ICD-10-CM

## 2022-07-20 DIAGNOSIS — R29.3 POSTURAL IMBALANCE: ICD-10-CM

## 2022-07-20 DIAGNOSIS — M25.662 DECREASED RANGE OF MOTION OF LEFT LOWER EXTREMITY: ICD-10-CM

## 2022-07-20 PROCEDURE — 97110 THERAPEUTIC EXERCISES: CPT | Mod: PN,CQ

## 2022-07-28 ENCOUNTER — CLINICAL SUPPORT (OUTPATIENT)
Dept: REHABILITATION | Facility: HOSPITAL | Age: 57
End: 2022-07-28
Attending: ORTHOPAEDIC SURGERY
Payer: COMMERCIAL

## 2022-07-28 DIAGNOSIS — R29.3 POSTURAL IMBALANCE: ICD-10-CM

## 2022-07-28 DIAGNOSIS — R29.898 DECREASED STRENGTH OF LOWER EXTREMITY: Primary | ICD-10-CM

## 2022-07-28 DIAGNOSIS — M25.662 DECREASED RANGE OF MOTION OF LEFT LOWER EXTREMITY: ICD-10-CM

## 2022-07-28 PROCEDURE — 97110 THERAPEUTIC EXERCISES: CPT | Mod: PN,CQ

## 2022-07-28 NOTE — PROGRESS NOTES
"[facility]   Physical Therapy Treatment Note     Name: Malika Curtis  Clinic Number: [id number]    Therapy Diagnosis:   Encounter Diagnoses   Name Primary?    Decreased strength of lower extremity Yes    Decreased range of motion of left lower extremity     Postural imbalance      Physician: Khoa Goodman MD    Visit Date: 2022    Physician: Khoa oGodman MD     Physician Orders: PT Eval and Treat   Medical Diagnosis from Referral: M70.62 (ICD-10-CM) - Trochanteric bursitis of left hip  Evaluation Date: 2022    PN DUE: at DC  Authorization Period Expiration: 2022  Plan of Care Expiration: 22  Visit # / Visits authorized:  (+eval)  PTA Visit #:      Time In: 805  Time Out: 900  Total Billable Time: 55 minutes (4 TE)    Precautions: Standard  SUBJECTIVE     Pt reports: Pt states she notices some discomfort after sitting with her day all day in the hospital but states that the pain is not significant. Pt states she has not had to take any ibuprofen since last visit.   She was compliant with home exercise program.  Response to previous treatment: soreness   Functional change: no change    Pain: 1/10  Location: left hip     OBJECTIVE     Objective Measures updated at progress report unless specified.     Walking Lo/13 .37 miles 10min     .37 miles in 10min   .43 miles in 10min      Treatment     Malika received therapeutic exercises to develop strength, ROM and flexibility for 55 minutes:      TM ambulation, spd 2.5-2.8, 10min     shuttle leg press 3 cords 3x10  single leg shuttle leg press 1.5 cord 3x10   shuttle kick back 1cord +3x10 each  matrix hip abduction +45# 3x10  medx core rotation 20# 3x10 each  medx lumbar extension 40# 3x10    Bridges GTB with hip abd 2x10  Deadbug with PB 2x10  SL clams GTB 3x10 3" holds bilateral   Reverse clams 1# 3x10  prone hip ext knee bend +2# 2x10 ea   lateral walk GTB @ knees 2 laps pole<>pole "   monster walk GTB @ knees 1 lap pole<>pole   seated hip IR/ER GTB 3x10         manual therapy techniques: Soft tissue Mobilization were applied to the: L hip for 00 minutes, including:  STM L glutes c/ theraband roll and piriformis c/ passive IR/ER  STM ITB c/ theraband roll  STM L glutes, piriformis  L piriformis release with passive hip IR/ER    cold pack for 00 minutes to L hip in R sidelying position at end of session (not billed)      Patient Education and Home Exercises     Home Exercises Provided and Patient Education Provided     Education provided:   -    Written Home Exercises Provided: Patient instructed to cont prior HEP. Exercises were reviewed and Malika was able to demonstrate them prior to the end of the session.  Malika demonstrated good  understanding of the education provided. See EMR under Patient Instructions for exercises provided during therapy sessions:  5/12/2022, 6/29/2022    ASSESSMENT     Pt continues to do well with exercise program and is able to tolerate increased weight/reps with various exercises without complaints of hip pain. Overall, pt has made good progress in therapy with overall decreased pain with daily functional activities and improved LE strength. Pt could benefit from continued strengthening however may be appropriate for independent strengthening at this time.     Malika Is progressing well towards her goals.   Pt prognosis is Good.     Pt will continue to benefit from skilled outpatient physical therapy to address the deficits listed in the problem list box on initial evaluation, provide pt/family education and to maximize pt's level of independence in the home and community environment.     Pt's spiritual, cultural and educational needs considered and pt agreeable to plan of care and goals.     Anticipated barriers to physical therapy: chronicity of condition       Goals: Short Term GOALS: 5 weeks. Pt agrees with goals set. progressing  1. Patient demonstrates  independence with HEP. met  2. Patient demonstrates independence with Postural Awareness. met  3. Patient demonstrates independence with body mechanics.   4. Patient will report pain of </=6/10 at worst, on 0-10 pain scale, with all activity. met  5. Patient demonstrates ability to perform light activities around her home with no difficulty to improve tolerance to functional tasks pain free. met  6. Patient demonstrates increased global hip strength to 4+/5 or greater to improve tolerance to functional activities pain free. Partially met     Long Term GOALS: 10 weeks. Pt agrees with goals set. progressing  1. Patient demonstrates ability to stand for 1 hour or greater with little to no difficulty to improve tolerance to functional tasks pain free  2. Patient demonstrates increased strength BLE's to 5/5 or greater to improve tolerance to functional activities pain free.   3. Patient demonstrates improved overall function per FOTO Hip Survey to 30% Limitation or less.   4. Patient will report pain of </=2/10 at worst, on 0-10 pain scale, with all activity met  5. Patient demonstrates ability to sit on the floor to fold laundry with little to no difficulty to return to PLOF.       PLAN     Plan of care Certification: 5/12/2022 to 8/12/22.      Outpatient Physical Therapy 2 times weekly for 10 weeks to include the following interventions: Cervical/Lumbar Traction, Gait Training, Manual Therapy, Moist Heat/ Ice, Neuromuscular Re-ed, Patient Education, Therapeutic Activities, Therapeutic Exercise and FDN.  Pt may be seen by PTA as part of the rehabilitation team.      Cont PT POC    Carlos Hernández, PTA  07/28/2022

## 2022-08-03 ENCOUNTER — CLINICAL SUPPORT (OUTPATIENT)
Dept: REHABILITATION | Facility: HOSPITAL | Age: 57
End: 2022-08-03
Attending: ORTHOPAEDIC SURGERY
Payer: COMMERCIAL

## 2022-08-03 DIAGNOSIS — R29.898 DECREASED STRENGTH OF LOWER EXTREMITY: Primary | ICD-10-CM

## 2022-08-03 DIAGNOSIS — M25.662 DECREASED RANGE OF MOTION OF LEFT LOWER EXTREMITY: ICD-10-CM

## 2022-08-03 DIAGNOSIS — R29.3 POSTURAL IMBALANCE: ICD-10-CM

## 2022-08-03 PROCEDURE — 97110 THERAPEUTIC EXERCISES: CPT | Mod: PN

## 2022-08-03 NOTE — PROGRESS NOTES
[facility]   Physical Therapy Discharge Note     Name: Malika Curtis  Clinic Number: [id number]    Therapy Diagnosis:   Encounter Diagnoses   Name Primary?    Decreased strength of lower extremity Yes    Decreased range of motion of left lower extremity     Postural imbalance      Physician: Khoa Goodman MD    Visit Date: 8/3/2022    Physician: Khoa Goodman MD     Physician Orders: PT Eval and Treat   Medical Diagnosis from Referral: M70.62 (ICD-10-CM) - Trochanteric bursitis of left hip  Evaluation Date: 2022      Authorization Period Expiration: 2022  Plan of Care Expiration: 22  Visit # / Visits authorized: 15/20 (+eval)  PTA Visit #: 0/5     Time In: 1236  Time Out: 1333  Total Billable Time: 47 minutes (3 TE)    Precautions: Standard  SUBJECTIVE     Pt reports: She still has some residual pain from being in the hospital with her dad. She got a massage gun that has really been helping.   She was compliant with home exercise program.  Response to previous treatment: soreness   Functional change: no change    Pain: 1/10  Location: left hip     OBJECTIVE     Objective Measures updated at progress report unless specified.     Walking Lo/13 .37 miles 10min    /20 .37 miles in 10min  /28 .43 miles in 10min  83  .44 miles in 10 minutes     8/3/22:    CMS Impairment/Limitation/Restriction for FOTO Hip Survey    Therapist reviewed FOTO scores for Malika Curtis on 8/3/2022.   FOTO documents entered into [employer] - see Media section.    Limitation Score: 28%       Strength: Knee    Left Right   Quadriceps 5/5 5/5   Hamstrings 5/5 5/5         Strength: Hip     Left Right   Iliopsoas 5/5  5/5   Glute Med 4+/5 ant fibers, 4+/5 post fibers 5/5   IR 4+/5  4+/5   ER 4+/5 4+/5   Ext 4+/5 5/5      Treatment     Malika received therapeutic exercises to develop strength, ROM and flexibility for 47 minutes:      TM ambulation, spd 2.8-3.0, 10min     shuttle leg press  3 cords 3x10  matrix hip abduction 45# 3x10  medx core rotation 20# 3x10 each   medx lumbar extension 40# 3x10    lateral walk GTB @ knees 2 laps pole<>pole   monster walk +Blue TB @ knees 2 lap pole<>pole   seated hip IR/ER GTB 3x10     cold pack for 10 minutes to L hip in R sidelying position at end of session (not billed)      Patient Education and Home Exercises     Home Exercises Provided and Patient Education Provided     Education provided:   - POC/Discharge; ways to progress exercises at home    Written Home Exercises Provided: Patient instructed to cont prior HEP. Exercises were reviewed and Malika was able to demonstrate them prior to the end of the session.  Malika demonstrated good  understanding of the education provided. See EMR under Patient Instructions for exercises provided during therapy sessions:  5/12/2022, 6/29/2022    ASSESSMENT     Pt was discharged with independent HEP and updated bands to continue strengthening at home. Pt has displayed improvements with strength, pain, and overall tolerance to functional activities since start of care. FOTO limitation improved from 42% limitation at start of care to 28% limitation at end of care.   Malika Is progressing well towards her goals.   Pt prognosis is Good.       Pt's spiritual, cultural and educational needs considered and pt agreeable to plan of care and goals.     Anticipated barriers to physical therapy: chronicity of condition       Goals: Short Term GOALS: 5 weeks. Pt agrees with goals set.   1. Patient demonstrates independence with HEP. met  2. Patient demonstrates independence with Postural Awareness. met  3. Patient demonstrates independence with body mechanics. met  4. Patient will report pain of </=6/10 at worst, on 0-10 pain scale, with all activity. met  5. Patient demonstrates ability to perform light activities around her home with no difficulty to improve tolerance to functional tasks pain free. met  6. Patient demonstrates  increased global hip strength to 4+/5 or greater to improve tolerance to functional activities pain free.  met     Long Term GOALS: 10 weeks. Pt agrees with goals set.   1. Patient demonstrates ability to stand for 1 hour or greater with little to no difficulty to improve tolerance to functional tasks pain free met  2. Patient demonstrates increased strength BLE's to 5/5 or greater to improve tolerance to functional activities pain free. Not met  3. Patient demonstrates improved overall function per FOTO Hip Survey to 30% Limitation or less. met  4. Patient will report pain of </=2/10 at worst, on 0-10 pain scale, with all activity. met  5. Patient demonstrates ability to sit on the floor to fold laundry with little to no difficulty to return to PLOF. met      PLAN     Discharged with independent HEP    Jen Hamm, PT,DPT  08/03/2022

## 2022-08-08 ENCOUNTER — HOSPITAL ENCOUNTER (OUTPATIENT)
Dept: RADIOLOGY | Facility: HOSPITAL | Age: 57
Discharge: HOME | End: 2022-08-08
Attending: OBSTETRICS & GYNECOLOGY
Payer: COMMERCIAL

## 2022-08-08 VITALS — WEIGHT: 151 LBS | BODY MASS INDEX: 29.64 KG/M2 | HEIGHT: 60 IN

## 2022-08-08 DIAGNOSIS — R92.8 ABNORMAL MAMMOGRAM OF LEFT BREAST: ICD-10-CM

## 2022-08-08 PROCEDURE — 88342 IMHCHEM/IMCYTCHM 1ST ANTB: CPT | Mod: 26,59,, | Performed by: PATHOLOGY

## 2022-08-08 PROCEDURE — 88341 IMHCHEM/IMCYTCHM EA ADD ANTB: CPT | Mod: 59 | Performed by: PATHOLOGY

## 2022-08-08 PROCEDURE — 88305 TISSUE EXAM BY PATHOLOGIST: ICD-10-PCS | Mod: 26,,, | Performed by: PATHOLOGY

## 2022-08-08 PROCEDURE — 19083 BX BREAST 1ST LESION US IMAG: CPT | Mod: LT,,, | Performed by: RADIOLOGY

## 2022-08-08 PROCEDURE — 19083 US BREAST BIOPSY WITH IMAGING 1ST SITE LEFT: ICD-10-PCS | Mod: LT,,, | Performed by: RADIOLOGY

## 2022-08-08 PROCEDURE — 25000003 PHARM REV CODE 250: Performed by: RADIOLOGY

## 2022-08-08 PROCEDURE — 88360 TUMOR IMMUNOHISTOCHEM/MANUAL: CPT | Mod: 26,,, | Performed by: PATHOLOGY

## 2022-08-08 PROCEDURE — 19081 MAMMO BREAST STEREOTACTIC BREAST BIOPSY LEFT: ICD-10-PCS | Mod: LT,,, | Performed by: RADIOLOGY

## 2022-08-08 PROCEDURE — 88360 TUMOR IMMUNOHISTOCHEM/MANUAL: CPT | Performed by: PATHOLOGY

## 2022-08-08 PROCEDURE — 88305 TISSUE EXAM BY PATHOLOGIST: CPT | Mod: 26,,, | Performed by: PATHOLOGY

## 2022-08-08 PROCEDURE — A4648 IMPLANTABLE TISSUE MARKER: HCPCS

## 2022-08-08 PROCEDURE — 88342 IMHCHEM/IMCYTCHM 1ST ANTB: CPT | Mod: 59 | Performed by: PATHOLOGY

## 2022-08-08 PROCEDURE — 88342 CHG IMMUNOCYTOCHEMISTRY: ICD-10-PCS | Mod: 26,59,, | Performed by: PATHOLOGY

## 2022-08-08 PROCEDURE — 88341 PR IHC OR ICC EACH ADD'L SINGLE ANTIBODY  STAINPR: ICD-10-PCS | Mod: 26,59,, | Performed by: PATHOLOGY

## 2022-08-08 PROCEDURE — 88360 PR  TUMOR IMMUNOHISTOCHEM/MANUAL: ICD-10-PCS | Mod: 26,,, | Performed by: PATHOLOGY

## 2022-08-08 PROCEDURE — 88341 IMHCHEM/IMCYTCHM EA ADD ANTB: CPT | Mod: 26,59,, | Performed by: PATHOLOGY

## 2022-08-08 PROCEDURE — 19081 BX BREAST 1ST LESION STRTCTC: CPT | Mod: LT,,, | Performed by: RADIOLOGY

## 2022-08-08 PROCEDURE — 88305 TISSUE EXAM BY PATHOLOGIST: CPT | Mod: 59 | Performed by: PATHOLOGY

## 2022-08-08 PROCEDURE — 25000003 PHARM REV CODE 250: Performed by: OBSTETRICS & GYNECOLOGY

## 2022-08-08 RX ORDER — LIDOCAINE HYDROCHLORIDE 20 MG/ML
20 INJECTION, SOLUTION INFILTRATION; PERINEURAL ONCE
Status: COMPLETED | OUTPATIENT
Start: 2022-08-08 | End: 2022-08-08

## 2022-08-08 RX ORDER — LIDOCAINE HYDROCHLORIDE AND EPINEPHRINE 10; 10 MG/ML; UG/ML
20 INJECTION, SOLUTION INFILTRATION; PERINEURAL ONCE
Status: COMPLETED | OUTPATIENT
Start: 2022-08-08 | End: 2022-08-08

## 2022-08-08 RX ORDER — LIDOCAINE HCL/EPINEPHRINE/PF 2%-1:200K
20 VIAL (ML) INJECTION ONCE
Status: COMPLETED | OUTPATIENT
Start: 2022-08-08 | End: 2022-08-08

## 2022-08-08 RX ADMIN — LIDOCAINE HYDROCHLORIDE,EPINEPHRINE BITARTRATE 13 ML: 20; .005 INJECTION, SOLUTION EPIDURAL; INFILTRATION; INTRACAUDAL; PERINEURAL at 09:08

## 2022-08-08 RX ADMIN — LIDOCAINE HYDROCHLORIDE,EPINEPHRINE BITARTRATE 10 ML: 10; .01 INJECTION, SOLUTION INFILTRATION; PERINEURAL at 11:08

## 2022-08-08 RX ADMIN — LIDOCAINE HYDROCHLORIDE 5 ML: 20 INJECTION, SOLUTION INFILTRATION; PERINEURAL at 09:08

## 2022-08-08 RX ADMIN — LIDOCAINE HYDROCHLORIDE 10 ML: 20 INJECTION, SOLUTION INFILTRATION; PERINEURAL at 11:08

## 2022-08-16 LAB
FINAL PATHOLOGIC DIAGNOSIS: NORMAL
GROSS: NORMAL
Lab: NORMAL
MICROSCOPIC EXAM: NORMAL

## 2022-08-17 ENCOUNTER — DOCUMENTATION ONLY (OUTPATIENT)
Dept: HEMATOLOGY/ONCOLOGY | Facility: CLINIC | Age: 57
End: 2022-08-17
Payer: COMMERCIAL

## 2022-08-17 NOTE — PROGRESS NOTES
Patient called by Rita Estrada with results of breast biopsy from 08/08/22. Reviewed those results with the patient.  Discussed what this means and that the next step is to meet with a breast surgeon and medical oncologist. An appt was made for 08/23/22 with Dr. Stahl and Dr Harper.    Oncology Navigation   Intake  Date of Diagnosis: 8/8/2022  Cancer Type: Breast  Internal / External Referral: Internal  Referral Source: [place]  Date of Referral: 8/8/2022  First Appointment Available: 8/23/2022  Appointment Date: 8/23/2022  First Available Date vs. Scheduled Date (days): 0  Multiple appointments: Yes (Stiven Stahl and Leroy)     Treatment  Current Status: Staging work-up    Surgical Oncologist: Favio  Consult Date: 8/23/2022    Medical Oncologist: Leroy  Consult Date: 8/23/2022       Procedures: Biopsy; Mammogram; Ultrasound  Biopsy Schedule Date: 8/8/2023  Mammo Schedule Date: 8/17/2022  Ultrasound Schedule Date: 8/17/2022       ER: Positive  TN: Positive  Her2: Negative           Acuity      Follow Up  No follow-ups on file.   Reviewed location of breast center. Patient verbalized understanding.

## 2022-08-23 ENCOUNTER — OFFICE VISIT (OUTPATIENT)
Dept: HEMATOLOGY/ONCOLOGY | Facility: CLINIC | Age: 57
End: 2022-08-23
Payer: COMMERCIAL

## 2022-08-23 ENCOUNTER — LAB VISIT (OUTPATIENT)
Dept: LAB | Facility: HOSPITAL | Age: 57
End: 2022-08-23
Attending: SURGERY
Payer: COMMERCIAL

## 2022-08-23 ENCOUNTER — OFFICE VISIT (OUTPATIENT)
Dept: SURGERY | Facility: CLINIC | Age: 57
End: 2022-08-23
Payer: COMMERCIAL

## 2022-08-23 VITALS
SYSTOLIC BLOOD PRESSURE: 120 MMHG | DIASTOLIC BLOOD PRESSURE: 79 MMHG | HEART RATE: 94 BPM | BODY MASS INDEX: 29.64 KG/M2 | HEIGHT: 60 IN | WEIGHT: 151 LBS

## 2022-08-23 DIAGNOSIS — Z17.0 CARCINOMA OF AXILLARY TAIL OF LEFT BREAST IN FEMALE, ESTROGEN RECEPTOR POSITIVE: ICD-10-CM

## 2022-08-23 DIAGNOSIS — C50.412 MALIGNANT NEOPLASM OF UPPER-OUTER QUADRANT OF LEFT BREAST IN FEMALE, ESTROGEN RECEPTOR POSITIVE: Primary | ICD-10-CM

## 2022-08-23 DIAGNOSIS — D05.12 DUCTAL CARCINOMA IN SITU (DCIS) OF LEFT BREAST: Primary | ICD-10-CM

## 2022-08-23 DIAGNOSIS — D05.12 DUCTAL CARCINOMA IN SITU (DCIS) OF LEFT BREAST: ICD-10-CM

## 2022-08-23 DIAGNOSIS — Z01.818 PRE-OP TESTING: ICD-10-CM

## 2022-08-23 DIAGNOSIS — C50.912 INVASIVE DUCTAL CARCINOMA OF BREAST, LEFT: ICD-10-CM

## 2022-08-23 DIAGNOSIS — Z17.0 MALIGNANT NEOPLASM OF UPPER-OUTER QUADRANT OF LEFT BREAST IN FEMALE, ESTROGEN RECEPTOR POSITIVE: Primary | ICD-10-CM

## 2022-08-23 DIAGNOSIS — C50.612 CARCINOMA OF AXILLARY TAIL OF LEFT BREAST IN FEMALE, ESTROGEN RECEPTOR POSITIVE: ICD-10-CM

## 2022-08-23 LAB
ALBUMIN SERPL BCP-MCNC: 4.5 G/DL (ref 3.5–5.2)
ALP SERPL-CCNC: 80 U/L (ref 55–135)
ALT SERPL W/O P-5'-P-CCNC: 32 U/L (ref 10–44)
ANION GAP SERPL CALC-SCNC: 8 MMOL/L (ref 8–16)
AST SERPL-CCNC: 24 U/L (ref 10–40)
BASOPHILS # BLD AUTO: 0.11 K/UL (ref 0–0.2)
BASOPHILS NFR BLD: 1.2 % (ref 0–1.9)
BILIRUB SERPL-MCNC: 0.5 MG/DL (ref 0.1–1)
BUN SERPL-MCNC: 11 MG/DL (ref 6–20)
CALCIUM SERPL-MCNC: 10 MG/DL (ref 8.7–10.5)
CHLORIDE SERPL-SCNC: 106 MMOL/L (ref 95–110)
CO2 SERPL-SCNC: 27 MMOL/L (ref 23–29)
CREAT SERPL-MCNC: 1.1 MG/DL (ref 0.5–1.4)
DIFFERENTIAL METHOD: NORMAL
EOSINOPHIL # BLD AUTO: 0.3 K/UL (ref 0–0.5)
EOSINOPHIL NFR BLD: 2.8 % (ref 0–8)
ERYTHROCYTE [DISTWIDTH] IN BLOOD BY AUTOMATED COUNT: 12.7 % (ref 11.5–14.5)
EST. GFR  (NO RACE VARIABLE): 59 ML/MIN/1.73 M^2
GLUCOSE SERPL-MCNC: 99 MG/DL (ref 70–110)
HCT VFR BLD AUTO: 43.8 % (ref 37–48.5)
HGB BLD-MCNC: 14.9 G/DL (ref 12–16)
IMM GRANULOCYTES # BLD AUTO: 0.02 K/UL (ref 0–0.04)
IMM GRANULOCYTES NFR BLD AUTO: 0.2 % (ref 0–0.5)
LYMPHOCYTES # BLD AUTO: 2.6 K/UL (ref 1–4.8)
LYMPHOCYTES NFR BLD: 28.7 % (ref 18–48)
MCH RBC QN AUTO: 30.6 PG (ref 27–31)
MCHC RBC AUTO-ENTMCNC: 34 G/DL (ref 32–36)
MCV RBC AUTO: 90 FL (ref 82–98)
MONOCYTES # BLD AUTO: 0.6 K/UL (ref 0.3–1)
MONOCYTES NFR BLD: 6.3 % (ref 4–15)
NEUTROPHILS # BLD AUTO: 5.5 K/UL (ref 1.8–7.7)
NEUTROPHILS NFR BLD: 60.8 % (ref 38–73)
NRBC BLD-RTO: 0 /100 WBC
PLATELET # BLD AUTO: 354 K/UL (ref 150–450)
PMV BLD AUTO: 9.2 FL (ref 9.2–12.9)
POTASSIUM SERPL-SCNC: 3.8 MMOL/L (ref 3.5–5.1)
PROT SERPL-MCNC: 7.1 G/DL (ref 6–8.4)
RBC # BLD AUTO: 4.87 M/UL (ref 4–5.4)
SODIUM SERPL-SCNC: 141 MMOL/L (ref 136–145)
WBC # BLD AUTO: 8.99 K/UL (ref 3.9–12.7)

## 2022-08-23 PROCEDURE — 3074F SYST BP LT 130 MM HG: CPT | Mod: CPTII,S$GLB,, | Performed by: SURGERY

## 2022-08-23 PROCEDURE — 99205 OFFICE O/P NEW HI 60 MIN: CPT | Mod: S$GLB,,, | Performed by: SURGERY

## 2022-08-23 PROCEDURE — 1159F PR MEDICATION LIST DOCUMENTED IN MEDICAL RECORD: ICD-10-PCS | Mod: CPTII,S$GLB,, | Performed by: SURGERY

## 2022-08-23 PROCEDURE — 99999 PR PBB SHADOW E&M-EST. PATIENT-LVL V: CPT | Mod: PBBFAC,,, | Performed by: SURGERY

## 2022-08-23 PROCEDURE — 85025 COMPLETE CBC W/AUTO DIFF WBC: CPT | Performed by: SURGERY

## 2022-08-23 PROCEDURE — 1160F RVW MEDS BY RX/DR IN RCRD: CPT | Mod: CPTII,S$GLB,, | Performed by: SURGERY

## 2022-08-23 PROCEDURE — 1159F MED LIST DOCD IN RCRD: CPT | Mod: CPTII,S$GLB,, | Performed by: SURGERY

## 2022-08-23 PROCEDURE — 99205 PR OFFICE/OUTPT VISIT, NEW, LEVL V, 60-74 MIN: ICD-10-PCS | Mod: S$GLB,,, | Performed by: SURGERY

## 2022-08-23 PROCEDURE — 80053 COMPREHEN METABOLIC PANEL: CPT | Performed by: SURGERY

## 2022-08-23 PROCEDURE — 3074F PR MOST RECENT SYSTOLIC BLOOD PRESSURE < 130 MM HG: ICD-10-PCS | Mod: CPTII,S$GLB,, | Performed by: SURGERY

## 2022-08-23 PROCEDURE — 99205 OFFICE O/P NEW HI 60 MIN: CPT | Mod: S$GLB,,, | Performed by: INTERNAL MEDICINE

## 2022-08-23 PROCEDURE — 99205 PR OFFICE/OUTPT VISIT, NEW, LEVL V, 60-74 MIN: ICD-10-PCS | Mod: S$GLB,,, | Performed by: INTERNAL MEDICINE

## 2022-08-23 PROCEDURE — 3008F BODY MASS INDEX DOCD: CPT | Mod: CPTII,S$GLB,, | Performed by: SURGERY

## 2022-08-23 PROCEDURE — 99999 PR PBB SHADOW E&M-EST. PATIENT-LVL I: ICD-10-PCS | Mod: PBBFAC,,, | Performed by: INTERNAL MEDICINE

## 2022-08-23 PROCEDURE — 3008F PR BODY MASS INDEX (BMI) DOCUMENTED: ICD-10-PCS | Mod: CPTII,S$GLB,, | Performed by: SURGERY

## 2022-08-23 PROCEDURE — 99999 PR PBB SHADOW E&M-EST. PATIENT-LVL I: CPT | Mod: PBBFAC,,, | Performed by: INTERNAL MEDICINE

## 2022-08-23 PROCEDURE — 36415 COLL VENOUS BLD VENIPUNCTURE: CPT | Performed by: SURGERY

## 2022-08-23 PROCEDURE — 99999 PR PBB SHADOW E&M-EST. PATIENT-LVL V: ICD-10-PCS | Mod: PBBFAC,,, | Performed by: SURGERY

## 2022-08-23 PROCEDURE — 3078F DIAST BP <80 MM HG: CPT | Mod: CPTII,S$GLB,, | Performed by: SURGERY

## 2022-08-23 PROCEDURE — 3078F PR MOST RECENT DIASTOLIC BLOOD PRESSURE < 80 MM HG: ICD-10-PCS | Mod: CPTII,S$GLB,, | Performed by: SURGERY

## 2022-08-23 PROCEDURE — 1160F PR REVIEW ALL MEDS BY PRESCRIBER/CLIN PHARMACIST DOCUMENTED: ICD-10-PCS | Mod: CPTII,S$GLB,, | Performed by: SURGERY

## 2022-08-23 NOTE — H&P (VIEW-ONLY)
Breast Surgery  [facility]  Department of Surgery      REFERRING PROVIDER: Douglas Mckinley MD  [address] [place] [address]    Chief Complaint: Breast Cancer (New patient presents today for a left breast DCIS & IDC diagnosis. No current pain in the breast.)      Subjective:      Patient ID: Malika Curtis is a 57 y.o. female who presents with abnormal screening MMG     Follow-up mammogram (22) showed an irregular high density mass with spiculated margins in the left breast upper outer quadrant anterior depth, confirmed by ultrasound. 2x ultrasound guided biopsies were sent to pathology, revealing 1) Low-grade ductal carcinoma in-situ and 2) Invasive ductal carcinoma of the left breast.     Findings at that time were the followin.  Left breast, upper outer, biopsy:   Low-grade ductal carcinoma in-situ (DCIS), cribriform pattern, with associated calcifications   DCIS measures 8 mm in greatest linear dimension within the core biopsy   ER:  Positive (95%, strong)   MN:  Positive (25%, moderate)   No evidence of invasive carcinoma     2.  Left breast mass, 2:00, biopsy:   Invasive ductal carcinoma, Grade 1 (T=2, N=2, M=1)   Invasive carcinoma measures 9 mm in greatest linear dimension within the core biopsy   ER:  Positive (%, strong)   MN:  Positive (90%, strong)   HER2:  Negative (Score 1)     Patient does not routinely do self breast exams.  Patient has not noted a change on breast exam.  Patient denies nipple discharge in L breast, but does experience white milky discharge in R breast occasionally.  Patient denies to previous breast biopsy. Patient denies a personal history of breast cancer.    GYN History:  Age of menarche was 14. Age of menopause was 55 .  Last menstrual period was 2020. Patient denies hormonal therapy. Patient is . Age of first live birth was 34. Patient did breast feed.    PMH of Anxiety, never smoker    FH of maternal uterine disease  (benign)  Paternal Aunt had breast cancer (55-65,  at 81, did not die of breast cancer)  Sister  of lung cancer (autoimmune dx)  Sister recently had suscipicious MMG, but was told to follow up in six months after US       Past Medical History:   Diagnosis Date    Anxiety      History reviewed. No pertinent surgical history.  Current Outpatient Medications on File Prior to Visit   Medication Sig Dispense Refill    albuterol (PROVENTIL) 2.5 mg /3 mL (0.083 %) nebulizer solution Take 2.5 mg by nebulization every 4 (four) hours as needed.      buPROPion (WELLBUTRIN) 75 MG tablet Take 75 mg by mouth 2 (two) times daily.      butalbital-acetaminophen-caffeine -40 mg (FIORICET, ESGIC) -40 mg per tablet Take 1 tablet by mouth every 6 (six) hours as needed.      cetirizine (ZYRTEC) 10 MG tablet Take 10 mg by mouth once daily.      diazePAM (VALIUM) 5 MG tablet Take 5 mg by mouth daily as needed.      meloxicam (MOBIC) 15 MG tablet Take 1 tablet by mouth once daily 30 tablet 0    montelukast (SINGULAIR) 10 mg tablet Take 10 mg by mouth every evening.      neomycin-polymyxin-hydrocortisone (CORTISPORIN) otic solution Place into both ears.      predniSONE (DELTASONE) 20 MG tablet Take 2 tablets daily for 5 days 10 tablet 0    amoxicillin-clavulanate 875-125mg (AUGMENTIN) 875-125 mg per tablet Take 1 tablet by mouth every 12 (twelve) hours.       No current facility-administered medications on file prior to visit.     Social History     Socioeconomic History    Marital status:    Tobacco Use    Smoking status: Never    Smokeless tobacco: Never   Substance and Sexual Activity    Alcohol use: Not Currently    Drug use: Never    Sexual activity: Yes     Partners: Male     Birth control/protection: Post-menopausal   Social History Narrative    ** Merged History Encounter **            Review of Systems   Constitutional: Negative.  Negative for chills and fever.   HENT: Negative.     Respiratory: Negative.   Negative for shortness of breath.    Cardiovascular: Negative.  Negative for chest pain and palpitations.   Gastrointestinal: Negative.    Endocrine: Negative.    Genitourinary: Negative.    Musculoskeletal: Negative.    Neurological: Negative.  Negative for dizziness and headaches.   Hematological: Negative.    Psychiatric/Behavioral: Negative.     All other systems reviewed and are negative.  Objective:   /79 (BP Location: Left arm, Patient Position: Sitting, BP Method: Medium (Automatic))   Pulse 94   Ht 5' (1.524 m)   Wt 68.5 kg (151 lb)   BMI 29.49 kg/m²     Physical Exam   Constitutional: No distress.   HENT:   Head: Normocephalic and atraumatic.   Nose: No rhinorrhea or congestion.   Eyes: Conjunctivae are normal. No scleral icterus.   Cardiovascular:  Normal rate, regular rhythm and normal pulses.            Pulmonary/Chest: Effort normal. No respiratory distress. She has no wheezes. Right breast exhibits inverted nipple. Right breast exhibits no nipple discharge. Left breast exhibits inverted nipple and tenderness. Left breast exhibits no nipple discharge. No breast swelling.       Abdominal: Soft. Normal appearance. She exhibits no distension and no mass.   Genitourinary: No breast swelling.   Musculoskeletal: No deformity or signs of injury.   Neurological: She is alert.   Skin: Skin is warm and dry.     Psychiatric: Mood normal.     Physical Exam  Constitutional:       General: She is not in acute distress.     Appearance: Normal appearance.   HENT:      Head: Normocephalic and atraumatic.      Nose: No congestion or rhinorrhea.   Eyes:      General: No scleral icterus.     Conjunctiva/sclera: Conjunctivae normal.   Cardiovascular:      Rate and Rhythm: Normal rate and regular rhythm.      Pulses: Normal pulses.   Pulmonary:      Effort: Pulmonary effort is normal. No respiratory distress.      Breath sounds: No wheezing.   Chest:   Breasts:  No breast swelling.      Right: Inverted nipple  present. No nipple discharge.      Left: Inverted nipple and tenderness present. No nipple discharge.       Abdominal:      General: There is no distension.      Palpations: Abdomen is soft. There is no mass.   Musculoskeletal:         General: No deformity or signs of injury.      Cervical back: Normal range of motion and neck supple.   Skin:     General: Skin is warm and dry.   Neurological:      General: No focal deficit present.      Mental Status: She is alert. Mental status is at baseline.   Psychiatric:         Mood and Affect: Mood normal.        Radiology review: Images personally reviewed by me in the clinic.     Mammogram 7/18 :The left breast has scattered areas of fibroglandular density.   In the left breast upper outer quadrant anterior depth, there is an irregular high density mass with spiculated margins.  Associated fine pleomorphic calcifications surrounding and extending posterior to this mass in a segmental distribution.  Total mammographic abnormality spans the middle and anterior depths, and measures 6.6 cm AP x 4.1 cm transverse by 2.9 cm craniocaudal.   No left axillary adenopathy.  Impression:Left breast 02:00 o'clock axis mass corresponds to the screening mammogram finding.  BI-RADS 5:  Highly suspicious for malignancy.  Recommend ultrasound-guided biopsy.      Ultrasound 7/18: In the left breast 02:00 o'clock axis 1 cm from the nipple, there is an irregular hypoechoic mass with angular and indistinct margins measuring 1.5 cm.  Associated posterior acoustic shadowing and rim hypervascularity.  This corresponds to the left breast mass visible by mammogram.      Assessment:       1. Malignant neoplasm of upper-outer quadrant of left breast in female, estrogen receptor positive    2. Pre-op testing    3. Ductal carcinoma in situ (DCIS) of left breast        Plan:     Options for management were discussed with the patient and her family. We reviewed the existing data noting the equivalency of  breast conserving surgery with radiation therapy and mastectomy. We also reviewed the guidelines of the National Comprehensive Cancer Network for Stage 1-2 breast carcinoma. We discussed the need for lumpectomy margins to be negative for carcinoma, the necessity for postoperative radiation therapy after breast conservation in most cases, the possibility of a failed or false negative sentinel lymph node biopsy and the potential need for complete lymphadenectomy for a failed or positive sentinel lymph node biopsy were fully discussed. In the setting of mastectomy, delayed or immediate reconstruction options are available and were discussed.     In the setting of lumpectomy, radiation therapy would be recommended majority of the time.  The duration and treatment side effects were discussed with the patient.  This will coordinated with the radiation oncologist pending final pathology.    We also discussed the role of systemic therapy in the treatment of early stage breast cancer.  We discussed that this is based on tumor biology and ronnie status and will be determined based on final pathology.  We discussed that if the cancer is hormone positive, endocrine therapy would be recommended in most cases and its use can reduce the risk of recurrence as well as improve survival. Side effects of treatment were briefly discussed. We also discussed the potential role for chemotherapy based on a number of factors such as tumor phenotype (ER+ vs. triple negative vs. Piu2knz+) and this would be determined in coordination with the medical oncologist.    Genetic testing.  The patient, in consultation with her family, has elected to proceed with bilateral total mastectomy and left sentinel lymph node biopsy. The operative risks of bleeding, infection, recurrence, scarring, and anesthetic complications and the possibility of requiring further surgery were all noted and informed consent obtained.    Surgery scheduled. Follow-up in clinic  roughly 14 days after surgery.     Patient was educated on breast cancer, receptors, wire localization lumpectomy, mastectomy, sentinel lymph node mapping and biopsy, axillary lymph node dissection, reconstruction, breast prosthesis with post-mastectomy bra and radiation therapy. Patient was given patient information binder including [facility] breast cancer treatment brochure.  All her questions were answered.    Total time spent with the patient: 65 minutes.  45 minutes of face to face consultation and 20 minutes of chart review and coordination of care.

## 2022-08-23 NOTE — Clinical Note
I am seeing her for a new diagnosis of breast cancer.  It appears to be very favorable but has a long segment of DCIS.  In order to clear the diseased area, we are opting for mastectomy.  I will keep you updated on her care.  Thanks for sending her! Roxana Stahl MD Breast Surgical Oncology

## 2022-08-23 NOTE — PROGRESS NOTES
Breast Surgery  [facility]  Department of Surgery      REFERRING PROVIDER: Douglas Mckinley MD  [address] [place] [address]    Chief Complaint: Breast Cancer (New patient presents today for a left breast DCIS & IDC diagnosis. No current pain in the breast.)      Subjective:      Patient ID: Malika Curtis is a 57 y.o. female who presents with abnormal screening MMG     Follow-up mammogram (22) showed an irregular high density mass with spiculated margins in the left breast upper outer quadrant anterior depth, confirmed by ultrasound. 2x ultrasound guided biopsies were sent to pathology, revealing 1) Low-grade ductal carcinoma in-situ and 2) Invasive ductal carcinoma of the left breast.     Findings at that time were the followin.  Left breast, upper outer, biopsy:   Low-grade ductal carcinoma in-situ (DCIS), cribriform pattern, with associated calcifications   DCIS measures 8 mm in greatest linear dimension within the core biopsy   ER:  Positive (95%, strong)   TN:  Positive (25%, moderate)   No evidence of invasive carcinoma     2.  Left breast mass, 2:00, biopsy:   Invasive ductal carcinoma, Grade 1 (T=2, N=2, M=1)   Invasive carcinoma measures 9 mm in greatest linear dimension within the core biopsy   ER:  Positive (%, strong)   TN:  Positive (90%, strong)   HER2:  Negative (Score 1)     Patient does not routinely do self breast exams.  Patient has not noted a change on breast exam.  Patient denies nipple discharge in L breast, but does experience white milky discharge in R breast occasionally.  Patient denies to previous breast biopsy. Patient denies a personal history of breast cancer.    GYN History:  Age of menarche was 14. Age of menopause was 55 .  Last menstrual period was 2020. Patient denies hormonal therapy. Patient is . Age of first live birth was 34. Patient did breast feed.    PMH of Anxiety, never smoker    FH of maternal uterine disease  (benign)  Paternal Aunt had breast cancer (55-65,  at 81, did not die of breast cancer)  Sister  of lung cancer (autoimmune dx)  Sister recently had suscipicious MMG, but was told to follow up in six months after US       Past Medical History:   Diagnosis Date    Anxiety      History reviewed. No pertinent surgical history.  Current Outpatient Medications on File Prior to Visit   Medication Sig Dispense Refill    albuterol (PROVENTIL) 2.5 mg /3 mL (0.083 %) nebulizer solution Take 2.5 mg by nebulization every 4 (four) hours as needed.      buPROPion (WELLBUTRIN) 75 MG tablet Take 75 mg by mouth 2 (two) times daily.      butalbital-acetaminophen-caffeine -40 mg (FIORICET, ESGIC) -40 mg per tablet Take 1 tablet by mouth every 6 (six) hours as needed.      cetirizine (ZYRTEC) 10 MG tablet Take 10 mg by mouth once daily.      diazePAM (VALIUM) 5 MG tablet Take 5 mg by mouth daily as needed.      meloxicam (MOBIC) 15 MG tablet Take 1 tablet by mouth once daily 30 tablet 0    montelukast (SINGULAIR) 10 mg tablet Take 10 mg by mouth every evening.      neomycin-polymyxin-hydrocortisone (CORTISPORIN) otic solution Place into both ears.      predniSONE (DELTASONE) 20 MG tablet Take 2 tablets daily for 5 days 10 tablet 0    amoxicillin-clavulanate 875-125mg (AUGMENTIN) 875-125 mg per tablet Take 1 tablet by mouth every 12 (twelve) hours.       No current facility-administered medications on file prior to visit.     Social History     Socioeconomic History    Marital status:    Tobacco Use    Smoking status: Never    Smokeless tobacco: Never   Substance and Sexual Activity    Alcohol use: Not Currently    Drug use: Never    Sexual activity: Yes     Partners: Male     Birth control/protection: Post-menopausal   Social History Narrative    ** Merged History Encounter **            Review of Systems   Constitutional: Negative.  Negative for chills and fever.   HENT: Negative.     Respiratory: Negative.   Negative for shortness of breath.    Cardiovascular: Negative.  Negative for chest pain and palpitations.   Gastrointestinal: Negative.    Endocrine: Negative.    Genitourinary: Negative.    Musculoskeletal: Negative.    Neurological: Negative.  Negative for dizziness and headaches.   Hematological: Negative.    Psychiatric/Behavioral: Negative.     All other systems reviewed and are negative.  Objective:   /79 (BP Location: Left arm, Patient Position: Sitting, BP Method: Medium (Automatic))   Pulse 94   Ht 5' (1.524 m)   Wt 68.5 kg (151 lb)   BMI 29.49 kg/m²     Physical Exam   Constitutional: No distress.   HENT:   Head: Normocephalic and atraumatic.   Nose: No rhinorrhea or congestion.   Eyes: Conjunctivae are normal. No scleral icterus.   Cardiovascular:  Normal rate, regular rhythm and normal pulses.            Pulmonary/Chest: Effort normal. No respiratory distress. She has no wheezes. Right breast exhibits inverted nipple. Right breast exhibits no nipple discharge. Left breast exhibits inverted nipple and tenderness. Left breast exhibits no nipple discharge. No breast swelling.       Abdominal: Soft. Normal appearance. She exhibits no distension and no mass.   Genitourinary: No breast swelling.   Musculoskeletal: No deformity or signs of injury.   Neurological: She is alert.   Skin: Skin is warm and dry.     Psychiatric: Mood normal.     Physical Exam  Constitutional:       General: She is not in acute distress.     Appearance: Normal appearance.   HENT:      Head: Normocephalic and atraumatic.      Nose: No congestion or rhinorrhea.   Eyes:      General: No scleral icterus.     Conjunctiva/sclera: Conjunctivae normal.   Cardiovascular:      Rate and Rhythm: Normal rate and regular rhythm.      Pulses: Normal pulses.   Pulmonary:      Effort: Pulmonary effort is normal. No respiratory distress.      Breath sounds: No wheezing.   Chest:   Breasts:  No breast swelling.      Right: Inverted nipple  present. No nipple discharge.      Left: Inverted nipple and tenderness present. No nipple discharge.       Abdominal:      General: There is no distension.      Palpations: Abdomen is soft. There is no mass.   Musculoskeletal:         General: No deformity or signs of injury.      Cervical back: Normal range of motion and neck supple.   Skin:     General: Skin is warm and dry.   Neurological:      General: No focal deficit present.      Mental Status: She is alert. Mental status is at baseline.   Psychiatric:         Mood and Affect: Mood normal.        Radiology review: Images personally reviewed by me in the clinic.     Mammogram 7/18 :The left breast has scattered areas of fibroglandular density.   In the left breast upper outer quadrant anterior depth, there is an irregular high density mass with spiculated margins.  Associated fine pleomorphic calcifications surrounding and extending posterior to this mass in a segmental distribution.  Total mammographic abnormality spans the middle and anterior depths, and measures 6.6 cm AP x 4.1 cm transverse by 2.9 cm craniocaudal.   No left axillary adenopathy.  Impression:Left breast 02:00 o'clock axis mass corresponds to the screening mammogram finding.  BI-RADS 5:  Highly suspicious for malignancy.  Recommend ultrasound-guided biopsy.      Ultrasound 7/18: In the left breast 02:00 o'clock axis 1 cm from the nipple, there is an irregular hypoechoic mass with angular and indistinct margins measuring 1.5 cm.  Associated posterior acoustic shadowing and rim hypervascularity.  This corresponds to the left breast mass visible by mammogram.      Assessment:       1. Malignant neoplasm of upper-outer quadrant of left breast in female, estrogen receptor positive    2. Pre-op testing    3. Ductal carcinoma in situ (DCIS) of left breast        Plan:     Options for management were discussed with the patient and her family. We reviewed the existing data noting the equivalency of  breast conserving surgery with radiation therapy and mastectomy. We also reviewed the guidelines of the National Comprehensive Cancer Network for Stage 1-2 breast carcinoma. We discussed the need for lumpectomy margins to be negative for carcinoma, the necessity for postoperative radiation therapy after breast conservation in most cases, the possibility of a failed or false negative sentinel lymph node biopsy and the potential need for complete lymphadenectomy for a failed or positive sentinel lymph node biopsy were fully discussed. In the setting of mastectomy, delayed or immediate reconstruction options are available and were discussed.     In the setting of lumpectomy, radiation therapy would be recommended majority of the time.  The duration and treatment side effects were discussed with the patient.  This will coordinated with the radiation oncologist pending final pathology.    We also discussed the role of systemic therapy in the treatment of early stage breast cancer.  We discussed that this is based on tumor biology and ronnie status and will be determined based on final pathology.  We discussed that if the cancer is hormone positive, endocrine therapy would be recommended in most cases and its use can reduce the risk of recurrence as well as improve survival. Side effects of treatment were briefly discussed. We also discussed the potential role for chemotherapy based on a number of factors such as tumor phenotype (ER+ vs. triple negative vs. [id number]+) and this would be determined in coordination with the medical oncologist.    Genetic testing.  The patient, in consultation with her family, has elected to proceed with bilateral total mastectomy and left sentinel lymph node biopsy. The operative risks of bleeding, infection, recurrence, scarring, and anesthetic complications and the possibility of requiring further surgery were all noted and informed consent obtained.    Surgery scheduled. Follow-up in clinic  roughly 14 days after surgery.     Patient was educated on breast cancer, receptors, wire localization lumpectomy, mastectomy, sentinel lymph node mapping and biopsy, axillary lymph node dissection, reconstruction, breast prosthesis with post-mastectomy bra and radiation therapy. Patient was given patient information binder including [facility] breast cancer treatment brochure.  All her questions were answered.    Total time spent with the patient: 65 minutes.  45 minutes of face to face consultation and 20 minutes of chart review and coordination of care.

## 2022-08-24 ENCOUNTER — DOCUMENTATION ONLY (OUTPATIENT)
Dept: SURGERY | Facility: CLINIC | Age: 57
End: 2022-08-24
Payer: COMMERCIAL

## 2022-08-24 DIAGNOSIS — D05.12 DUCTAL CARCINOMA IN SITU (DCIS) OF LEFT BREAST: Primary | ICD-10-CM

## 2022-08-24 NOTE — NURSING
Nurse Navigator Note:     Met with patient during her consult with Dr. Stahl.  Patient and I reviewed the information she discussed with Dr. Stahl, including treatment options, diagnosis, and future plans for workup. Patient and I went through the new patient binder, explained some of the information and why it is provided.     Also offered patient consults with our other specialty clinics: Dr. Richmond for gynecological health during treatment, our breast physical therapy department for pre-op and post-operative assessments, Dr. Segura for psychological support, and Valentina Jimenez for nutritional counseling. Explained to patient that all of these support services are completely optional. Discussed that physical therapy may call patient to offer pre-op appt, and what that appt would entail.     Patient was given a copy of her appointments, Dr. Stahl's card, and my card. Encouraged her to call me if she has any questions or concerns or would like to schedule any additional appointments. Verbalized understanding of all information.     Genetics done at visit. Referrals placed to integrative oncology and PT. Bag and Bra given to patient. Patient added to support group list.    Oncology Navigation   Intake  Date of Diagnosis: 8/8/2022  Cancer Type: Breast  Internal / External Referral: Internal  Referral Source: [place]  Date of Referral: 8/8/2022  Initial Nurse Navigator Contact: 8/23/2022  Date Worked: 8/23/2022  First Appointment Available: 8/23/2022  Appointment Date: 8/23/2022  First Available Date vs. Scheduled Date (days): 0  Multiple appointments: Yes (Stiven Stahl and Leroy)     Treatment  Current Status: Active    Surgery: Planned  Surgical Oncologist: Favio  Type of Surgery: Bilateral mastectomy with SLNB and no recon  Consult Date: 8/23/2022  Surgery Schedule Date: 9/21/2022    Medical Oncologist: Leroy  Consult Date: 8/23/2022       Procedures: Genetic test  Biopsy Schedule Date: 8/8/2023  Genetic  Testing Date Sent: 8/23/2022  Mammo Schedule Date: 8/17/2022  Ultrasound Schedule Date: 8/17/2022       ER: Positive  VT: Positive  Her2: Negative       Support Systems: Spouse/significant other     Acuity      Follow Up  Follow up in about 6 weeks (around 10/4/2022) for f/u post op.

## 2022-09-02 ENCOUNTER — TELEPHONE (OUTPATIENT)
Dept: SURGERY | Facility: CLINIC | Age: 57
End: 2022-09-02
Payer: COMMERCIAL

## 2022-09-02 ENCOUNTER — PATIENT MESSAGE (OUTPATIENT)
Dept: SURGERY | Facility: CLINIC | Age: 57
End: 2022-09-02
Payer: COMMERCIAL

## 2022-09-02 NOTE — TELEPHONE ENCOUNTER
Genetic Lay Navigation Note:    Called patient with the results of her genetic testing. Informed patient that results were negative for any notable mutation. Instructed patient that we would ensure she received a copy of her results via [employer] or mail, and to call us with any questions or concerns regarding the full report. Patient verbalized understanding to all information, no questions at this time.     Patient's ordering physician made aware of results and that patient was informed of them.       Full [employer] Genetic results report scanned in media and attached to documentation note dated 8/24/2022.

## 2022-09-07 PROBLEM — Z17.0 MALIGNANT NEOPLASM OF UPPER-OUTER QUADRANT OF LEFT BREAST IN FEMALE, ESTROGEN RECEPTOR POSITIVE: Status: ACTIVE | Noted: 2022-09-07

## 2022-09-07 PROBLEM — C50.412 MALIGNANT NEOPLASM OF UPPER-OUTER QUADRANT OF LEFT BREAST IN FEMALE, ESTROGEN RECEPTOR POSITIVE: Status: ACTIVE | Noted: 2022-09-07

## 2022-09-09 ENCOUNTER — HOSPITAL ENCOUNTER (OUTPATIENT)
Dept: PREADMISSION TESTING | Facility: OTHER | Age: 57
Discharge: HOME | End: 2022-09-09
Attending: SURGERY
Payer: COMMERCIAL

## 2022-09-09 ENCOUNTER — ANESTHESIA EVENT (OUTPATIENT)
Dept: SURGERY | Facility: OTHER | Age: 57
End: 2022-09-09
Payer: COMMERCIAL

## 2022-09-09 VITALS
SYSTOLIC BLOOD PRESSURE: 120 MMHG | TEMPERATURE: 98 F | WEIGHT: 151 LBS | RESPIRATION RATE: 16 BRPM | HEIGHT: 60 IN | BODY MASS INDEX: 29.64 KG/M2 | HEART RATE: 92 BPM | OXYGEN SATURATION: 97 % | DIASTOLIC BLOOD PRESSURE: 70 MMHG

## 2022-09-09 DIAGNOSIS — Z01.818 PRE-OP TESTING: ICD-10-CM

## 2022-09-09 PROCEDURE — 93005 ELECTROCARDIOGRAM TRACING: CPT

## 2022-09-09 PROCEDURE — 93010 ELECTROCARDIOGRAM REPORT: CPT | Mod: ,,, | Performed by: INTERNAL MEDICINE

## 2022-09-09 PROCEDURE — 93010 EKG 12-LEAD: ICD-10-PCS | Mod: ,,, | Performed by: INTERNAL MEDICINE

## 2022-09-09 RX ORDER — ALBUTEROL SULFATE 2.5 MG/.5ML
2.5 SOLUTION RESPIRATORY (INHALATION)
Status: CANCELLED | OUTPATIENT
Start: 2022-09-09 | End: 2022-09-09

## 2022-09-09 RX ORDER — LIDOCAINE HYDROCHLORIDE 10 MG/ML
0.5 INJECTION, SOLUTION EPIDURAL; INFILTRATION; INTRACAUDAL; PERINEURAL ONCE
Status: CANCELLED | OUTPATIENT
Start: 2022-09-09 | End: 2022-09-09

## 2022-09-09 RX ORDER — PREGABALIN 50 MG/1
50 CAPSULE ORAL
Status: CANCELLED | OUTPATIENT
Start: 2022-09-09 | End: 2022-09-09

## 2022-09-09 RX ORDER — SODIUM CHLORIDE, SODIUM LACTATE, POTASSIUM CHLORIDE, CALCIUM CHLORIDE 600; 310; 30; 20 MG/100ML; MG/100ML; MG/100ML; MG/100ML
INJECTION, SOLUTION INTRAVENOUS CONTINUOUS
Status: CANCELLED | OUTPATIENT
Start: 2022-09-09

## 2022-09-09 NOTE — ANESTHESIA PREPROCEDURE EVALUATION
09/09/2022  Malika Curtis is a 57 y.o., female.      Pre-op Assessment    I have reviewed the Patient Summary Reports.     I have reviewed the Nursing Notes. I have reviewed the NPO Status.   I have reviewed the Medications.     Review of Systems  Anesthesia Hx:  No previous Anesthesia  Denies Family Hx of Anesthesia complications.   Denies Personal Hx of Anesthesia complications.   Social:  Non-Smoker    Hematology/Oncology:  Hematology Normal      Current/Recent Cancer. Breast left surgery   EENT/Dental:EENT/Dental Normal   Cardiovascular:  Cardiovascular Normal     Pulmonary:   Uses inhaler for frequent bouts of bronchitis   Renal/:  Renal/ Normal     Hepatic/GI:   GERD, well controlled    Musculoskeletal:  Musculoskeletal Normal    Neurological:   Headaches    Endocrine:  Endocrine Normal    Dermatological:  Skin Normal    Psych:  Psychiatric Normal           Physical Exam  General: Cooperative, Alert and Oriented    Airway:  Mallampati: II   Mouth Opening: Small, but > 3cm  Neck ROM: Normal ROM    Dental:  Caps / Implants        Anesthesia Plan  Type of Anesthesia, risks & benefits discussed:    Anesthesia Type: Gen ETT  Intra-op Monitoring Plan: Standard ASA Monitors  Post Op Pain Control Plan: multimodal analgesia  Induction:  IV  Airway Plan: Video, Post-Induction  Informed Consent: Informed consent signed with the Patient and all parties understand the risks and agree with anesthesia plan.  All questions answered.   ASA Score: 2  Anesthesia Plan Notes: Labs in epic  Devices on rt side    Ready For Surgery From Anesthesia Perspective.     .

## 2022-09-09 NOTE — DISCHARGE INSTRUCTIONS
Information to Prepare you for your Surgery    PRE-ADMIT TESTING -  [phone]    [address] [facility]          Your surgery has been scheduled at [facility]. We are pleased to have the opportunity to serve you. For Further Information please call [phone].    On the day of surgery please report to the Information Desk on the 1st floor.    CONTACT YOUR PHYSICIAN'S OFFICE THE DAY PRIOR TO YOUR SURGERY TO OBTAIN YOUR ARRIVAL TIME.     The evening before surgery do not eat anything after 9 p.m. ( this includes hard candy, chewing gum and mints).  You may only have GATORADE, POWERADE AND WATER  from 9 p.m. until you leave your home.   DO NOT DRINK ANY LIQUIDS ON THE WAY TO THE HOSPITAL.      Why does your anesthesiologist allow you to drink Gatorade/Powerade before surgery?  Gatorade/Powerade helps to increase your comfort before surgery and to decrease your nausea after surgery. The carbohydrates in Gatorade/Powerade help reduce your body's stress response to surgery.  If you are a diabetic-drink only water prior to surgery.      Current Visitor policy(12/27/2021) - Patients may have 2 visitors pre and post procedure. Only 2 visitors will be allowed in the Surgical building with the patient.     SPECIAL MEDICATION INSTRUCTIONS: TAKE medications checked off by the Anesthesiologist on your Medication List.    Surgery Patients:  If you take ASPIRIN - Your PHYSICIAN/SURGEON will need to inform you IF/OR when you need to stop taking aspirin prior to your surgery.     Do Not take any medications containing IBUPROFEN.    Do Not Wear any make-up (especially eye make-up) to surgery. Please remove any false eyelashes or eyelash extensions. If you arrive the day of surgery with makeup/eyelashes on you will be required to remove prior to surgery. (There is a risk of corneal abrasions if eye makeup/eyelash extensions are not removed)      Leave all valuables at home.    Do Not wear any jewelry or watches, including any metal in body piercings. Jewelry must be removed prior to coming to the hospital.  There is a possibility that rings that are unable to be removed may be cut off if they are on the surgical extremity.    Please remove all hair extensions, wigs, clips and any other metal accessories/ ornaments from your hair.  These items may pose a flammable/fire risk in Surgery and must be removed.    Do not shave your surgical area at least 5 days prior to your surgery. The surgical prep will be performed at the hospital according to Infection Control regulations.    Contact Lens must be removed before surgery. Either do not wear the contact lens or bring a case and solution for storage.  Please bring a container for eyeglasses or dentures as required.  Bring any paperwork your physician has provided, such as consent forms,  history and physicals, doctor's orders, etc.   Bring comfortable clothes that are loose fitting to wear upon discharge. Take into consideration the type of surgery being performed.  Maintain your diet as advised per your physician the day prior to surgery.      Adequate rest the night before surgery is advised.   Park in the Parking lot behind the hospital or in the [facility] across the street from the parking lot. Parking is complimentary.  If you will be discharged the same day as your procedure, please arrange for a responsible adult to drive you home or to accompany you if traveling by taxi.   YOU WILL NOT BE PERMITTED TO DRIVE OR TO LEAVE THE HOSPITAL ALONE AFTER SURGERY.   If you are being discharged the same day, it is strongly recommended that you arrange for someone to remain with you for the first 24 hrs following your surgery.    The Surgeon will speak to your family/visitor after your surgery regarding the outcome of your surgery and post op care.  The Surgeon may speak to you after your surgery, but there is a possibility you may  not remember the details.  Please check with your family members regarding the conversation with the Surgeon.    We strongly recommend whoever is bringing you home be present for discharge instructions.  This will ensure a thorough understanding for your post op home care.    ALL CHILDREN MUST ALWAYS BE ACCOMPANIED BY AN ADULT.    Visitors-Refer to current Visitor policy handouts.    Thank you for your cooperation.  The Staff of [facility].            Bathing Instructions with Hibiclens    Shower the evening before and morning of your procedure with Hibiclens:  Wash your face with water and your regular face wash/soap  Apply Hibiclens directly on your skin or on a wet washcloth and wash gently. When showering: Move away from the shower stream when applying Hibiclens to avoid rinsing off too soon.  Rinse thoroughly with warm water  Do not dilute Hibiclens        Dry off as usual, do not use any deodorant, powder, body lotions, perfume, after shave or cologne.

## 2022-09-20 ENCOUNTER — TELEPHONE (OUTPATIENT)
Dept: SURGERY | Facility: CLINIC | Age: 57
End: 2022-09-20
Payer: COMMERCIAL

## 2022-09-20 NOTE — TELEPHONE ENCOUNTER
Confirmed arrival time for surgery on 9/21/22 at the [facility] with Dr.Amy Stahl. Arrival time is for 5 am surgery is scheduled for 7 am. Nothing to eat after 9 pm this evening 9/20/22 . Clear liquids Gatorade up until 4 hrs prior to surgery. Informed  she may have 2 people with her on the morning of surgery if needed. Please leave all jewelry home also wear a button down shirt on the morning of surgery. Patient voiced understanding to this call.

## 2022-09-21 ENCOUNTER — HOSPITAL ENCOUNTER (OUTPATIENT)
Facility: OTHER | Age: 57
Discharge: HOME | End: 2022-09-21
Attending: SURGERY | Admitting: SURGERY
Payer: COMMERCIAL

## 2022-09-21 ENCOUNTER — HOSPITAL ENCOUNTER (OUTPATIENT)
Dept: RADIOLOGY | Facility: OTHER | Age: 57
Discharge: HOME | End: 2022-09-21
Attending: SURGERY | Admitting: SURGERY
Payer: COMMERCIAL

## 2022-09-21 ENCOUNTER — HOSPITAL ENCOUNTER (OUTPATIENT)
Dept: RADIOLOGY | Facility: OTHER | Age: 57
Discharge: HOME | End: 2022-09-21
Attending: SURGERY
Payer: COMMERCIAL

## 2022-09-21 ENCOUNTER — ANESTHESIA (OUTPATIENT)
Dept: SURGERY | Facility: OTHER | Age: 57
End: 2022-09-21
Payer: COMMERCIAL

## 2022-09-21 DIAGNOSIS — Z01.818 PRE-OP TESTING: ICD-10-CM

## 2022-09-21 DIAGNOSIS — D05.12 DUCTAL CARCINOMA IN SITU (DCIS) OF LEFT BREAST: ICD-10-CM

## 2022-09-21 DIAGNOSIS — C50.412 MALIGNANT NEOPLASM OF UPPER-OUTER QUADRANT OF LEFT BREAST IN FEMALE, ESTROGEN RECEPTOR POSITIVE: ICD-10-CM

## 2022-09-21 DIAGNOSIS — Z17.0 MALIGNANT NEOPLASM OF UPPER-OUTER QUADRANT OF LEFT BREAST IN FEMALE, ESTROGEN RECEPTOR POSITIVE: ICD-10-CM

## 2022-09-21 PROCEDURE — 88307 PR  SURG PATH,LEVEL V: ICD-10-PCS | Mod: 26,,, | Performed by: PATHOLOGY

## 2022-09-21 PROCEDURE — 25000003 PHARM REV CODE 250: Performed by: ANESTHESIOLOGY

## 2022-09-21 PROCEDURE — 71000015 HC POSTOP RECOV 1ST HR: Performed by: SURGERY

## 2022-09-21 PROCEDURE — 63600175 PHARM REV CODE 636 W HCPCS

## 2022-09-21 PROCEDURE — 63600175 PHARM REV CODE 636 W HCPCS: Performed by: ANESTHESIOLOGY

## 2022-09-21 PROCEDURE — 25000003 PHARM REV CODE 250: Performed by: SURGERY

## 2022-09-21 PROCEDURE — C9290 INJ, BUPIVACAINE LIPOSOME: HCPCS | Performed by: SURGERY

## 2022-09-21 PROCEDURE — 36000706: Performed by: SURGERY

## 2022-09-21 PROCEDURE — 25000242 PHARM REV CODE 250 ALT 637 W/ HCPCS: Performed by: ANESTHESIOLOGY

## 2022-09-21 PROCEDURE — 63600175 PHARM REV CODE 636 W HCPCS: Performed by: STUDENT IN AN ORGANIZED HEALTH CARE EDUCATION/TRAINING PROGRAM

## 2022-09-21 PROCEDURE — 36000707: Performed by: SURGERY

## 2022-09-21 PROCEDURE — 63600175 PHARM REV CODE 636 W HCPCS: Performed by: SURGERY

## 2022-09-21 PROCEDURE — 76098 X-RAY EXAM SURGICAL SPECIMEN: CPT | Mod: TC

## 2022-09-21 PROCEDURE — 88342 IMHCHEM/IMCYTCHM 1ST ANTB: CPT | Mod: 26,,, | Performed by: PATHOLOGY

## 2022-09-21 PROCEDURE — 88342 IMHCHEM/IMCYTCHM 1ST ANTB: CPT | Performed by: PATHOLOGY

## 2022-09-21 PROCEDURE — 88331 PATH CONSLTJ SURG 1 BLK 1SPC: CPT | Mod: 26,,, | Performed by: PATHOLOGY

## 2022-09-21 PROCEDURE — 38525 BIOPSY/REMOVAL LYMPH NODES: CPT | Mod: 51,LT,, | Performed by: SURGERY

## 2022-09-21 PROCEDURE — 37000009 HC ANESTHESIA EA ADD 15 MINS: Performed by: SURGERY

## 2022-09-21 PROCEDURE — 71000039 HC RECOVERY, EACH ADD'L HOUR: Performed by: SURGERY

## 2022-09-21 PROCEDURE — 88341 IMHCHEM/IMCYTCHM EA ADD ANTB: CPT | Mod: 26,,, | Performed by: PATHOLOGY

## 2022-09-21 PROCEDURE — 71000016 HC POSTOP RECOV ADDL HR: Performed by: SURGERY

## 2022-09-21 PROCEDURE — 38792 RA TRACER ID OF SENTINL NODE: CPT | Mod: 51,LT,, | Performed by: SURGERY

## 2022-09-21 PROCEDURE — 38792 PR IDENTIFY SENTINEL 2DE: ICD-10-PCS | Mod: 51,LT,, | Performed by: SURGERY

## 2022-09-21 PROCEDURE — 19303 MAST SIMPLE COMPLETE: CPT | Mod: 50,,, | Performed by: SURGERY

## 2022-09-21 PROCEDURE — 71000033 HC RECOVERY, INTIAL HOUR: Performed by: SURGERY

## 2022-09-21 PROCEDURE — 88342 CHG IMMUNOCYTOCHEMISTRY: ICD-10-PCS | Mod: 26,,, | Performed by: PATHOLOGY

## 2022-09-21 PROCEDURE — 19303 PR MASTECTOMY, SIMPLE, COMPLETE: ICD-10-PCS | Mod: 50,,, | Performed by: SURGERY

## 2022-09-21 PROCEDURE — 25000003 PHARM REV CODE 250: Performed by: STUDENT IN AN ORGANIZED HEALTH CARE EDUCATION/TRAINING PROGRAM

## 2022-09-21 PROCEDURE — 27201423 OPTIME MED/SURG SUP & DEVICES STERILE SUPPLY: Performed by: SURGERY

## 2022-09-21 PROCEDURE — 88331 PATH CONSLTJ SURG 1 BLK 1SPC: CPT | Performed by: PATHOLOGY

## 2022-09-21 PROCEDURE — 88307 TISSUE EXAM BY PATHOLOGIST: CPT | Performed by: PATHOLOGY

## 2022-09-21 PROCEDURE — C1729 CATH, DRAINAGE: HCPCS | Performed by: SURGERY

## 2022-09-21 PROCEDURE — 88341 PR IHC OR ICC EACH ADD'L SINGLE ANTIBODY  STAINPR: ICD-10-PCS | Mod: 26,,, | Performed by: PATHOLOGY

## 2022-09-21 PROCEDURE — 88307 TISSUE EXAM BY PATHOLOGIST: CPT | Mod: 26,,, | Performed by: PATHOLOGY

## 2022-09-21 PROCEDURE — 94640 AIRWAY INHALATION TREATMENT: CPT

## 2022-09-21 PROCEDURE — 38525 PR BIOPSY/REM LYMPH NODES, AXILLARY: ICD-10-PCS | Mod: 51,LT,, | Performed by: SURGERY

## 2022-09-21 PROCEDURE — 37000008 HC ANESTHESIA 1ST 15 MINUTES: Performed by: SURGERY

## 2022-09-21 PROCEDURE — A9520 TC99 TILMANOCEPT DIAG 0.5MCI: HCPCS

## 2022-09-21 PROCEDURE — 88331 PR  PATH CONSULT IN SURG,W FRZ SEC: ICD-10-PCS | Mod: 26,,, | Performed by: PATHOLOGY

## 2022-09-21 PROCEDURE — 88341 IMHCHEM/IMCYTCHM EA ADD ANTB: CPT | Mod: 59 | Performed by: PATHOLOGY

## 2022-09-21 RX ORDER — SODIUM CHLORIDE 0.9 % (FLUSH) 0.9 %
3 SYRINGE (ML) INJECTION
Status: DISCONTINUED | OUTPATIENT
Start: 2022-09-21 | End: 2022-09-21 | Stop reason: HOSPADM

## 2022-09-21 RX ORDER — ONDANSETRON HYDROCHLORIDE 2 MG/ML
INJECTION, SOLUTION INTRAMUSCULAR; INTRAVENOUS
Status: DISCONTINUED | OUTPATIENT
Start: 2022-09-21 | End: 2022-09-21

## 2022-09-21 RX ORDER — MIDAZOLAM HYDROCHLORIDE 1 MG/ML
INJECTION INTRAMUSCULAR; INTRAVENOUS
Status: DISCONTINUED | OUTPATIENT
Start: 2022-09-21 | End: 2022-09-21

## 2022-09-21 RX ORDER — LIDOCAINE HYDROCHLORIDE 10 MG/ML
0.5 INJECTION, SOLUTION EPIDURAL; INFILTRATION; INTRACAUDAL; PERINEURAL ONCE
Status: DISCONTINUED | OUTPATIENT
Start: 2022-09-21 | End: 2022-09-21 | Stop reason: HOSPADM

## 2022-09-21 RX ORDER — CEFAZOLIN SODIUM 1 G/3ML
2 INJECTION, POWDER, FOR SOLUTION INTRAMUSCULAR; INTRAVENOUS
Status: COMPLETED | OUTPATIENT
Start: 2022-09-21 | End: 2022-09-21

## 2022-09-21 RX ORDER — DIPHENHYDRAMINE HYDROCHLORIDE 50 MG/ML
INJECTION INTRAMUSCULAR; INTRAVENOUS
Status: DISCONTINUED | OUTPATIENT
Start: 2022-09-21 | End: 2022-09-21

## 2022-09-21 RX ORDER — LIDOCAINE HYDROCHLORIDE 20 MG/ML
INJECTION, SOLUTION EPIDURAL; INFILTRATION; INTRACAUDAL; PERINEURAL
Status: DISCONTINUED | OUTPATIENT
Start: 2022-09-21 | End: 2022-09-21

## 2022-09-21 RX ORDER — PREGABALIN 50 MG/1
50 CAPSULE ORAL
Status: COMPLETED | OUTPATIENT
Start: 2022-09-21 | End: 2022-09-21

## 2022-09-21 RX ORDER — PROCHLORPERAZINE EDISYLATE 5 MG/ML
5 INJECTION INTRAMUSCULAR; INTRAVENOUS EVERY 30 MIN PRN
Status: DISCONTINUED | OUTPATIENT
Start: 2022-09-21 | End: 2022-09-21 | Stop reason: HOSPADM

## 2022-09-21 RX ORDER — PROPOFOL 10 MG/ML
VIAL (ML) INTRAVENOUS
Status: DISCONTINUED | OUTPATIENT
Start: 2022-09-21 | End: 2022-09-21

## 2022-09-21 RX ORDER — KETOROLAC TROMETHAMINE 30 MG/ML
INJECTION, SOLUTION INTRAMUSCULAR; INTRAVENOUS
Status: DISCONTINUED | OUTPATIENT
Start: 2022-09-21 | End: 2022-09-21

## 2022-09-21 RX ORDER — OXYCODONE HYDROCHLORIDE 5 MG/1
5 TABLET ORAL
Status: DISCONTINUED | OUTPATIENT
Start: 2022-09-21 | End: 2022-09-21 | Stop reason: HOSPADM

## 2022-09-21 RX ORDER — PHENYLEPHRINE HYDROCHLORIDE 10 MG/ML
INJECTION INTRAVENOUS
Status: DISCONTINUED | OUTPATIENT
Start: 2022-09-21 | End: 2022-09-21

## 2022-09-21 RX ORDER — SUCCINYLCHOLINE CHLORIDE 20 MG/ML
INJECTION INTRAMUSCULAR; INTRAVENOUS
Status: DISCONTINUED | OUTPATIENT
Start: 2022-09-21 | End: 2022-09-21

## 2022-09-21 RX ORDER — SODIUM CHLORIDE 9 MG/ML
INJECTION, SOLUTION INTRAVENOUS CONTINUOUS
Status: DISCONTINUED | OUTPATIENT
Start: 2022-09-21 | End: 2022-09-21 | Stop reason: HOSPADM

## 2022-09-21 RX ORDER — HYDROCODONE BITARTRATE AND ACETAMINOPHEN 5; 325 MG/1; MG/1
1 TABLET ORAL EVERY 6 HOURS PRN
Qty: 10 TABLET | Refills: 0 | Status: SHIPPED | OUTPATIENT
Start: 2022-09-21 | End: 2022-09-29 | Stop reason: SDUPTHER

## 2022-09-21 RX ORDER — BUPIVACAINE HYDROCHLORIDE 2.5 MG/ML
INJECTION, SOLUTION EPIDURAL; INFILTRATION; INTRACAUDAL
Status: DISCONTINUED | OUTPATIENT
Start: 2022-09-21 | End: 2022-09-21 | Stop reason: HOSPADM

## 2022-09-21 RX ORDER — DIPHENHYDRAMINE HYDROCHLORIDE 50 MG/ML
12.5 INJECTION INTRAMUSCULAR; INTRAVENOUS EVERY 30 MIN PRN
Status: DISCONTINUED | OUTPATIENT
Start: 2022-09-21 | End: 2022-09-21 | Stop reason: HOSPADM

## 2022-09-21 RX ORDER — HYDROMORPHONE HYDROCHLORIDE 2 MG/ML
0.4 INJECTION, SOLUTION INTRAMUSCULAR; INTRAVENOUS; SUBCUTANEOUS EVERY 5 MIN PRN
Status: DISCONTINUED | OUTPATIENT
Start: 2022-09-21 | End: 2022-09-21 | Stop reason: HOSPADM

## 2022-09-21 RX ORDER — ALBUTEROL SULFATE 2.5 MG/.5ML
2.5 SOLUTION RESPIRATORY (INHALATION)
Status: COMPLETED | OUTPATIENT
Start: 2022-09-21 | End: 2022-09-21

## 2022-09-21 RX ORDER — DEXMEDETOMIDINE HYDROCHLORIDE 100 UG/ML
INJECTION, SOLUTION INTRAVENOUS
Status: DISCONTINUED | OUTPATIENT
Start: 2022-09-21 | End: 2022-09-21

## 2022-09-21 RX ORDER — SODIUM CHLORIDE, SODIUM LACTATE, POTASSIUM CHLORIDE, CALCIUM CHLORIDE 600; 310; 30; 20 MG/100ML; MG/100ML; MG/100ML; MG/100ML
INJECTION, SOLUTION INTRAVENOUS CONTINUOUS
Status: DISCONTINUED | OUTPATIENT
Start: 2022-09-21 | End: 2022-09-21 | Stop reason: HOSPADM

## 2022-09-21 RX ORDER — FENTANYL CITRATE 50 UG/ML
INJECTION, SOLUTION INTRAMUSCULAR; INTRAVENOUS
Status: DISCONTINUED | OUTPATIENT
Start: 2022-09-21 | End: 2022-09-21

## 2022-09-21 RX ORDER — DEXAMETHASONE SODIUM PHOSPHATE 4 MG/ML
INJECTION, SOLUTION INTRA-ARTICULAR; INTRALESIONAL; INTRAMUSCULAR; INTRAVENOUS; SOFT TISSUE
Status: DISCONTINUED | OUTPATIENT
Start: 2022-09-21 | End: 2022-09-21

## 2022-09-21 RX ORDER — ROCURONIUM BROMIDE 10 MG/ML
INJECTION, SOLUTION INTRAVENOUS
Status: DISCONTINUED | OUTPATIENT
Start: 2022-09-21 | End: 2022-09-21

## 2022-09-21 RX ADMIN — ONDANSETRON 4 MG: 2 INJECTION, SOLUTION INTRAMUSCULAR; INTRAVENOUS at 10:09

## 2022-09-21 RX ADMIN — PHENYLEPHRINE HYDROCHLORIDE 100 MCG: 10 INJECTION INTRAVENOUS at 10:09

## 2022-09-21 RX ADMIN — CARBOXYMETHYLCELLULOSE SODIUM 2 DROP: 2.5 SOLUTION/ DROPS OPHTHALMIC at 07:09

## 2022-09-21 RX ADMIN — FENTANYL CITRATE 50 MCG: 50 INJECTION, SOLUTION INTRAMUSCULAR; INTRAVENOUS at 07:09

## 2022-09-21 RX ADMIN — DEXMEDETOMIDINE HYDROCHLORIDE 4 MCG: 100 INJECTION, SOLUTION, CONCENTRATE INTRAVENOUS at 08:09

## 2022-09-21 RX ADMIN — HYDROMORPHONE HYDROCHLORIDE 0.4 MG: 2 INJECTION INTRAMUSCULAR; INTRAVENOUS; SUBCUTANEOUS at 12:09

## 2022-09-21 RX ADMIN — SODIUM CHLORIDE, SODIUM LACTATE, POTASSIUM CHLORIDE, AND CALCIUM CHLORIDE: 600; 310; 30; 20 INJECTION, SOLUTION INTRAVENOUS at 09:09

## 2022-09-21 RX ADMIN — DIPHENHYDRAMINE HYDROCHLORIDE 12.5 MG: 50 INJECTION, SOLUTION INTRAMUSCULAR; INTRAVENOUS at 10:09

## 2022-09-21 RX ADMIN — PROPOFOL 200 MG: 10 INJECTION, EMULSION INTRAVENOUS at 07:09

## 2022-09-21 RX ADMIN — LIDOCAINE HYDROCHLORIDE 100 MG: 20 INJECTION, SOLUTION EPIDURAL; INFILTRATION; INTRACAUDAL; PERINEURAL at 07:09

## 2022-09-21 RX ADMIN — FENTANYL CITRATE 50 MCG: 50 INJECTION, SOLUTION INTRAMUSCULAR; INTRAVENOUS at 08:09

## 2022-09-21 RX ADMIN — SUCCINYLCHOLINE CHLORIDE 160 MG: 20 INJECTION, SOLUTION INTRAMUSCULAR; INTRAVENOUS at 07:09

## 2022-09-21 RX ADMIN — SUGAMMADEX 200 MG: 100 INJECTION, SOLUTION INTRAVENOUS at 11:09

## 2022-09-21 RX ADMIN — PREGABALIN 50 MG: 50 CAPSULE ORAL at 06:09

## 2022-09-21 RX ADMIN — ROCURONIUM BROMIDE 5 MG: 10 SOLUTION INTRAVENOUS at 07:09

## 2022-09-21 RX ADMIN — PHENYLEPHRINE HYDROCHLORIDE 200 MCG: 10 INJECTION INTRAVENOUS at 10:09

## 2022-09-21 RX ADMIN — ROCURONIUM BROMIDE 25 MG: 10 SOLUTION INTRAVENOUS at 09:09

## 2022-09-21 RX ADMIN — CEFAZOLIN 2 G: 330 INJECTION, POWDER, FOR SOLUTION INTRAMUSCULAR; INTRAVENOUS at 07:09

## 2022-09-21 RX ADMIN — PHENYLEPHRINE HYDROCHLORIDE 100 MCG: 10 INJECTION INTRAVENOUS at 09:09

## 2022-09-21 RX ADMIN — SODIUM CHLORIDE, SODIUM LACTATE, POTASSIUM CHLORIDE, AND CALCIUM CHLORIDE: 600; 310; 30; 20 INJECTION, SOLUTION INTRAVENOUS at 07:09

## 2022-09-21 RX ADMIN — MIDAZOLAM HYDROCHLORIDE 2 MG: 1 INJECTION, SOLUTION INTRAMUSCULAR; INTRAVENOUS at 07:09

## 2022-09-21 RX ADMIN — KETOROLAC TROMETHAMINE 30 MG: 30 INJECTION, SOLUTION INTRAMUSCULAR; INTRAVENOUS at 10:09

## 2022-09-21 RX ADMIN — OXYCODONE 5 MG: 5 TABLET ORAL at 11:09

## 2022-09-21 RX ADMIN — PROPOFOL 30 MG: 10 INJECTION, EMULSION INTRAVENOUS at 08:09

## 2022-09-21 RX ADMIN — ALBUTEROL SULFATE 2.5 MG: 2.5 SOLUTION RESPIRATORY (INHALATION) at 06:09

## 2022-09-21 RX ADMIN — DEXAMETHASONE SODIUM PHOSPHATE 8 MG: 4 INJECTION, SOLUTION INTRAMUSCULAR; INTRAVENOUS at 07:09

## 2022-09-21 RX ADMIN — GLYCOPYRROLATE 0.2 MG: 0.2 INJECTION, SOLUTION INTRAMUSCULAR; INTRAVITREAL at 07:09

## 2022-09-21 NOTE — PLAN OF CARE
Malika Curtis has met all discharge criteria from Phase II. Vital Signs are stable, ambulating  without difficulty. Discharge instructions given, patient verbalized understanding. Discharged from facility via wheelchair in stable condition.

## 2022-09-21 NOTE — OP NOTE
Operative Note     9/21/2022    PRE-OP DIAGNOSIS: Ductal carcinoma in situ (DCIS) of left breast [D05.12]   Malignant neoplasm left breast in female, upper outer quadrant, estrogen positive     POST-OP DIAGNOSIS: Post-Op Diagnosis Codes:     * Ductal carcinoma in situ (DCIS) of left breast [D05.12]  Same    PROCEDURES:    Procedure(s):  MASTECTOMY-Bilateral total simple  BIOPSY, LYMPH NODE, SENTINEL-Left  INJECTION, FOR SENTINEL NODE IDENTIFICATION-Left     SURGEON: Surgeon(s) and Role:     * Roxana Stahl MD - Primary     RESIDENT:   Terry duran    ANESTHESIA: General     OPERATIVE FINDINGS: Healthy appearing skin flaps at the completion of the mastectomies.  [place] node biopsy negative on frozen section.    INDICATION FOR PROCEDURE: This patient presents with a history of invasive carcinoma of the left breast    PROCEDURE IN DETAIL:  Malika Curtis is a 57 y.o. female brought to the operating room for definitive surgery of invasive carcinoma of the left breast.  The patient has elected to undergo bilateral simple mastectomy with sentinel lymph node biopsy for ronnie assessment. The patient was informed of the possible risks and complications of the procedure, including but not limited to anesthetic risks, bleeding, infection, and need for additional surgery.  The patient concurred with the proposed plan, and has given informed consent.  The site of surgery was properly noted/marked in the preoperative holding area.     The patient was then brought to the operating room and placed in the supine position with both upper extremities extended.  local and general anesthesia was administered. Perioperative antibiotics were administered consisting of Ancef and a time out was performed confirming the patient, site, and procedure.  The patient's left breast was injected with technetium to facilitate sentinel lymph node identification. The bilateral chest and axilla was then prepped and draped in the usual sterile  fashion.    We first turned our attention to the right prophylactic breast where an elliptical incision was extended from the axillary incision to incorporate the nipple areolar complex.  The incision was made with a 10-blade and deepened through the subcutaneous tissues with Bovie electrocautery.  Skin flaps were raised to the clavicle superiorly, to the lateral border of the sternum medially, to the inframammary fold inferiorly, and to the anterior border of the latissimus dorsi muscle laterally. The breast tissue was sharply excised off the chest wall taking care to incorporate the pectoralis fascia while leaving the serratus fascia behind.  The resulting mastectomy specimen was marked using a short stitch superiorly and a long stitch laterally.  The breast was sent to pathology for permanent evaluation.   The operative field was irrigated with normal saline and all bleeding points were secured with Bovie electrocautery.    A 19 Fr tej drain was placed under the mastectomy flap. The incision was closed using an interrupted 3-0 vicryl deep dermal stitch followed by a running 4-0 vicryl subcuticula.      We then turned our attention to the left breast where an elliptical incision was fashioned to incorporate the nipple areolar complex.  The incision was made with a 10-blade and extended through the subcutaneous tissues with Bovie electrocautery.  Skin flaps were raised to the clavicle superiorly.  We then  turned our attention to the left axilla.  The gamma probe was used to identify an area of increased radioactivity within the lower axilla. The clavipectoral sheath was sharply incised to reveal the level I axillary lymph nodes. The probe was used to identify a single node with increased radioactivity.  This node was brought into the operative field and carefully dissected free of the surrounding lymphovascular structures.  The highest ex vivo count of the node was 193.  The node was then sent to pathology for  frozen section evaluation, labeled as sentinel node #1.  A total of 1 axillary sentinel nodes and 0 axillary non-sentinel nodes were identified, excised and submitted to pathology.  Bed counts were obtained to confirm that the 10% rule had not been violated.   The wound was irrigated with normal saline, and all bleeding points were secured with Bovie electrocautery.     We then proceeded to raise the remainder of the flaps to the lateral border of the sternum medially, to the inframammary fold inferiorly, and to the anterior border of the latissimus dorsi muscle laterally. The breast tissue was sharply excised off the chest wall taking care to incorporate the pectoralis fascia while leaving the serratus fascia behind.  The resulting mastectomy specimen was marked using a short stitch superiorly and long stitch laterally.  The breast was sent to pathology for permanent evaluation.      Frozen section ronnie evaluation revealed no evidence of metastatic disease.  Therefore, the operative field was irrigated with normal saline and all bleeding points were secured with Bovie electrocautery.  A 19 Fr tej drain was placed under the mastectomy flap. The incision was closed using an interrupted 3-0 vicryl deep dermal stitch followed by a running 4-0 vicryl subcuticular.      Dermabond was applied. A post surgical bra was placed on the patient. At the end of the operation, all sponge, instrument, and needle counts x 2 were correct.    ESTIMATED BLOOD LOSS: less than 50 mL    COMPLICATIONS: none    DISPOSITION: PACU - hemodynamically stable.    ATTESTATION:   I was present and scrubbed for the entire procedure.    Breast Cancer - Synoptic Operative Report Summary    Auto-populated Information    Patient name Malika Curtis    Patient ID [id number]    Date of surgery 9/21/2022    Name of surgeon Roxana Stahl MD    Name of co-surgeon none   Name of assistant(s) Yamila   Indication for surgery Invasive carcinoma   Pre-operative  diagnosis Ductal carcinoma in situ (DCIS) of left breast [D05.12]    Post-operative diagnosis * No post-op diagnosis entered *   Procedure(s) performed Procedure(s):  MASTECTOMY-Bilateral  BIOPSY, LYMPH NODE, SENTINEL-Left  INJECTION, FOR SENTINEL NODE IDENTIFICATION-Left    Specimens * No specimens in log *    Type of anesthesia General    Estimated blood loss * No values recorded between 9/21/2022  8:10 AM and 9/21/2022 11:14 AM *   Drain/Implant Drains       Drain  Duration                  Closed/Suction Drain 09/21/22 Inferior;Lateral;Right Breast Bulb 19 Fr. <1 day         Closed/Suction Drain 09/21/22 Lateral;Left Breast Bulb 19 Fr. <1 day                     No requested implants      Additional administrative details Specimen radiograph with clips identified     Cancer-Specific Information    Preoperative Cancer-Specific Goal Curative intent   Preoperative Cancer Type Invasive cancer   Preoperative Tumor Location Upper-outer quadrant of breast   Preoperative TNM Classification Cancer Staging   No matching staging information was found for the patient.    T1 (Tumor <20 mm in greatest dimension)  N0 (No regional lymph node metastases, by imaging or clinical examination)  M0 (No clinical or radiographic evidence of distant metastases)    G1 (Low combined histologic grade; SBR score of 3-5 points)    Negative    Positive    Positive   Preoperative Therapy None     Right breast    Procedure-specific Information - Mastectomy    Procedure Intent Excision of primary tumor with grossly negative margins   Mastectomy Type Total (simple)   Type of Incision Other - elliptical   Tumescent Solutions Used No   Pectoralis Fascia, Muscle or Both Removed Fascia only   Reconstruction No   Drains Placed Yes       Procedure-specific Information - [place] Lymph Node Biopsy    Tracers Used to Identify [place] Nodes in Neoadjuvant and Non-neoadjuvant Setting Radioactive tracer; non-neoadjuvant (upfront surgery) setting    Additional Tracer Details (Optional) Subareolar injection   Completeness of [place] and Palpable Node Removal All significantly radioactive nodes were removed   Biopsy-proven Positive Nodes Marked with Clips Prior to Chemotherapy Identified and Removed Not applicable       Left breast    Procedure-specific Information - Mastectomy    Procedure Intent prophylaxis   Mastectomy Type Total (simple)   Type of Incision Other - elliptical   Tumescent Solutions Used No   Pectoralis Fascia, Muscle or Both Removed Fascia only   Reconstruction No   Drains Placed Yes       Additional Procedure Details    Oncoplastic Reconstruction No   Intraoperative Radiation No   Intraoperative Complications None   Additional Surgical Information None

## 2022-09-21 NOTE — TRANSFER OF CARE
Anesthesia Transfer of Care Note    Patient: Mailka Curtis    Procedure(s) Performed: Procedure(s) (LRB):  MASTECTOMY-Bilateral (Bilateral)  BIOPSY, LYMPH NODE, SENTINEL-Left (Left)  INJECTION, FOR SENTINEL NODE IDENTIFICATION-Left (Left)    Patient location: PACU    Anesthesia Type: general    Transport from OR: Transported from OR on 6-10 L/min O2 by face mask with adequate spontaneous ventilation    Post pain: adequate analgesia    Post assessment: no apparent anesthetic complications and tolerated procedure well    Post vital signs: stable    Level of consciousness: sedated    Nausea/Vomiting: no nausea/vomiting    Complications: none    Transfer of care protocol was followed

## 2022-09-21 NOTE — INTERVAL H&P NOTE
The patient has been examined and the H&P has been reviewed:    I concur with the findings and no changes have occurred since H&P was written.    Surgery risks, benefits and alternative options discussed and understood by patient/family.        There are no hospital problems to display for this patient.

## 2022-09-21 NOTE — BRIEF OP NOTE
[facility] ([place])  Brief Operative Note    Surgery Date: 9/21/2022     Surgeon(s) and Role:     * Roxana Stahl MD - Primary    Assisting Surgeon: Terry Driscoll MD PGY1    Pre-op Diagnosis:  Ductal carcinoma in situ (DCIS) of left breast [D05.12]    Post-op Diagnosis:  Post-Op Diagnosis Codes:     * Ductal carcinoma in situ (DCIS) of left breast [D05.12]    Procedure(s) (LRB):  MASTECTOMY-Bilateral (Bilateral)  BIOPSY, LYMPH NODE, SENTINEL-Left (Left)  INJECTION, FOR SENTINEL NODE IDENTIFICATION-Left (Left)      Anesthesia: General    Operative Findings: s/p bilateral  mastectomy  with left axillary sentinel lymph node biopsy    Estimated Blood Loss: <50cc         Specimens:   Specimen (24h ago, onward)      None              Discharge Note    OUTCOME: Patient tolerated treatment/procedure well without complication and is now ready for discharge.    DISPOSITION: Home or Self Care    FINAL DIAGNOSIS: Malignant neoplasm of upper-outer quadrant of left breast in female, estrogen receptor positive    FOLLOWUP: In clinic    DISCHARGE INSTRUCTIONS:    POSTOPERATIVE INSTRUCTIONS FOLLOWING   MASTECTOMY AND/OR AXILLARY LYMPH NODE DISSECTION    The following are post-operative instructions that will help you to recover from your surgery.  Please read over these instructions carefully and contact us if we can answer any of your questions or concerns.    Post-op care/Dressing/breast binder (surgi-bra)  A surgical bra may be placed around your chest after your surgery.  If you are given the bra, please wear it for the first 48 hours after surgery. After 48 hours you can remove your surgical bra and dressing to shower/cleanse the chest wall with antibacterial soap and warm water. Do not take a tub bath and do not soak the surgical site for at least 2 weeks.     The final pathology report will be available approximately 7-10 days after your surgery.  Our office will call you with your pathology report when it  becomes available.    If blue dye was used to locate your sentinel lymph nodes, your urine and stool may be blue-green in color for 1 or 2 days.    Activity   You will be able to do much of your own personal care, such as bathing, dressing, preparing simple meals, etc.  A short walk each day will help with your recovery  You may find that you need to take rest breaks between activities, but you should not need to stay in bed for prolonged periods of time during the day. A good rule during this time is to listen to your body, do what is comfortable, and stop and rest when your feel tired.  If it hurts, dont do it.  Return to taking your daily medications as prescribed  Please avoid activities that require moderate to heavy lifting (grocery shopping) or pushing/pulling (vacuuming) and repetitive motions (such as washing windows). Do not lift anything heavier than a gallon of milk.  Following a lymph node dissection, dont avoid using your arm, but dont exercise your arm until after your first post-operative visit.  At your first post-op visit, you will be given arm exercises to regain movement and flexibility.  You may be referred to physical therapy if needed.  You may restart driving when you are no longer on narcotics and you feel safe turning the wheel and stopping quickly.  You will need to be out of work approximately 2-6 weeks depending on your particular surgery and how well you are recovering.  We will evaluate how you are doing at the first post-op appointment.  This is a good time to ask when you may return to work and what activities you may do.    Medication for pain  You will be given a prescription for pain medication. You should not drive or operate machinery while taking these.  Please take prescription pain medication (narcotics) with food.  Narcotics can cause, or worsen, constipation.  You will need to increase your fluid intake, eat high fiber foods (such as fruits and bran) and make sure that  you are up and walking. You may need to take an over the counter stool softener for constipation.  Short term use of an icepack may be helpful to decrease discomfort and swelling, particularly to the armpit after lymph node surgery.  A small pillow positioned in the armpit may also decrease discomfort after lymph node surgery.  If you are given a prescription for antibiotics, take them as prescribed.    How to care for your Drain(s)  Wash hands--STRIP or milk the drainage tube as it comes out of your body toward the bulb.   Beginning where the drain comes out of your body, hold drainage tubing with one hand and with the other, stretch and release tubing an inch at time while moving downward with both hands toward the bulb.  Do this 2-3 times before emptying the bulb.  Remove the stopper from the bulbs port  (drainage port)  Pour the drainage in the measuring cup provided by the nurse  Flatten/squeeze the bulb to create a vacuum and replace the stopper before letting go of the bulb.  Record the date, time and amount of drainage in ccs (not ounces) each time bulb is emptied. If you have more than one drain, record each separately.  Discard the drainage into the toilet after measuring and then wash hands.  Empty bulbs 2-3 times/day or as needed if it fills up before 8 hours.  Remember to bring the output record with you to your doctors appointment.    Please report the following:  Temperature greater than 101 degrees  Discharge or bad odor from the wound  Excessive bleeding, such as saturated bloody dressing or extreme bruising  Redness at incision and/or drain sites  Swelling or buildup of fluid around incision  Persistent fevers, chills, nausea, vomiting, or diarrhea    Additional information  Your surgeon will see you approximately 2 weeks following your surgery.  If this follow-up appointment has not been made, please call the office.    If you have any questions or problems, please call my office or my  nurse.      Lymphedema Risk Reduction    Lymphedema is a swelling of a part of the body, caused by an insufficient lymphatic system and an accumulation of fluid in the bodys tissues.  Lymphedema may occur when normal drainage of fluid is disrupted, such as an infection, injury, cancer, scar tissue, or removal of lymph nodes.    If you had a full axillary lymph node dissection procedure, you may be at greater risk for lymphedema.     For those patients having a sentinel lymph node biopsy, these risks may be smaller and the recommendations are provided for your review and consideration.    The following list contains recommendations for reducing your risk of developing lymphedema.    Skin Care--avoid trauma/injury to reduce infection risk  Keep the hand and arm on the side of surgery clean and dry  Pay attention to nail care and do not cut cuticles  Avoid punctures, such as injections and blood draws from you on the side of your surgery  Wear gloves while doing activities that may cause skin injury (washing dishes, gardening, etc.)  If scratches or punctures occur, wash area with soap and water, and observe for signs of infections (redness, drainage, swelling)  If a rash, itching, redness, pain, increased skin temperatures, fever, or flu-like symptoms occur, contact your physician immediately for early treatment of a possible infection  Activity/Lifestyle  Gradually build up the duration and intensity of any activity or exercise  Take frequent rest periods during activity to allow for arm recovery  Monitor your arm and upper body during and after activity for any change in size, shape, tissue, texture, soreness, heaviness, or firmness  Avoid constriction of your arm on the side of your surgery  Avoid having blood pressure taken on the arm on the side of your surgery  Wear loose fitting jewelry and clothing  Be careful not to rest a heavy purse, luggage, or grocery bags on that arm  When you return to wearing a bra,  make sure that it is well fitted and not too tight

## 2022-09-21 NOTE — PATIENT INSTRUCTIONS
POSTOPERATIVE INSTRUCTIONS FOLLOWING   MASTECTOMY AND/OR AXILLARY LYMPH NODE DISSECTION    The following are post-operative instructions that will help you to recover from your surgery.  Please read over these instructions carefully and contact us if we can answer any of your questions or concerns.    Post-op care/Dressing/breast binder (surgi-bra)  A surgical bra may be placed around your chest after your surgery.  If you are given the bra, please wear it for the first 48 hours after surgery. After 48 hours you can remove your surgical bra and dressing to shower/cleanse the chest wall with antibacterial soap and warm water. Do not take a tub bath and do not soak the surgical site for at least 2 weeks.     The final pathology report will be available approximately 7-10 days after your surgery.  Our office will call you with your pathology report when it becomes available.    If blue dye was used to locate your sentinel lymph nodes, your urine and stool may be blue-green in color for 1 or 2 days.      Activity   You will be able to do much of your own personal care, such as bathing, dressing, preparing simple meals, etc.  A short walk each day will help with your recovery  You may find that you need to take rest breaks between activities, but you should not need to stay in bed for prolonged periods of time during the day. A good rule during this time is to listen to your body, do what is comfortable, and stop and rest when your feel tired.  If it hurts, dont do it.  Return to taking your daily medications as prescribed  Please avoid activities that require moderate to heavy lifting (grocery shopping) or pushing/pulling (vacuuming) and repetitive motions (such as washing windows). Do not lift anything heavier than a gallon of milk.  Following a lymph node dissection, dont avoid using your arm, but dont exercise your arm until after your first post-operative visit.  At your first post-op visit, you will be given arm  exercises to regain movement and flexibility.  You may be referred to physical therapy if needed.  You may restart driving when you are no longer on narcotics and you feel safe turning the wheel and stopping quickly.  You will need to be out of work approximately 2-6 weeks depending on your particular surgery and how well you are recovering.  We will evaluate how you are doing at the first post-op appointment.  This is a good time to ask when you may return to work and what activities you may do.    Medication for pain  You will be given a prescription for pain medication. You should not drive or operate machinery while taking these.  Please take prescription pain medication (narcotics) with food.  Narcotics can cause, or worsen, constipation.  You will need to increase your fluid intake, eat high fiber foods (such as fruits and bran) and make sure that you are up and walking. You may need to take an over the counter stool softener for constipation.  Short term use of an icepack may be helpful to decrease discomfort and swelling, particularly to the armpit after lymph node surgery.  A small pillow positioned in the armpit may also decrease discomfort after lymph node surgery.  If you are given a prescription for antibiotics, take them as prescribed.    How to care for your Drain(s)  Wash hands--STRIP or milk the drainage tube as it comes out of your body toward the bulb.   Beginning where the drain comes out of your body, hold drainage tubing with one hand and with the other, stretch and release tubing an inch at time while moving downward with both hands toward the bulb.  Do this 2-3 times before emptying the bulb.  Remove the stopper from the bulbs port  (drainage port)  Pour the drainage in the measuring cup provided by the nurse  Flatten/squeeze the bulb to create a vacuum and replace the stopper before letting go of the bulb.  Record the date, time and amount of drainage in ccs (not ounces) each time bulb  is emptied. If you have more than one drain, record each separately.  Discard the drainage into the toilet after measuring and then wash hands.  Empty bulbs 2-3 times/day or as needed if it fills up before 8 hours.  Remember to bring the output record with you to your doctors appointment.    Please report the following:  Temperature greater than 101 degrees  Discharge or bad odor from the wound  Excessive bleeding, such as saturated bloody dressing or extreme bruising  Redness at incision and/or drain sites  Swelling or buildup of fluid around incision  Persistent fevers, chills, nausea, vomiting, or diarrhea    Additional information  Your surgeon will see you approximately 2 weeks following your surgery.  If this follow-up appointment has not been made, please call the office.    If you have any questions or problems, please call my office or my nurse.    After hours and on weekends, you may call the main [facility] line at [phone] and ask to have the general surgery resident paged or have me paged      Lymphedema Risk Reduction    Lymphedema is a swelling of a part of the body, caused by an insufficient lymphatic system and an accumulation of fluid in the bodys tissues.  Lymphedema may occur when normal drainage of fluid is disrupted, such as an infection, injury, cancer, scar tissue, or removal of lymph nodes.    If you had a full axillary lymph node dissection procedure, you may be at greater risk for lymphedema.     For those patients having a sentinel lymph node biopsy, these risks may be smaller and the recommendations are provided for your review and consideration.    The following list contains recommendations for reducing your risk of developing lymphedema.    Skin Care--avoid trauma/injury to reduce infection risk  Keep the hand and arm on the side of surgery clean and dry  Pay attention to nail care and do not cut cuticles  Avoid punctures, such as injections and blood draws from you on the side of  your surgery  Wear gloves while doing activities that may cause skin injury (washing dishes, gardening, etc.)  If scratches or punctures occur, wash area with soap and water, and observe for signs of infections (redness, drainage, swelling)  If a rash, itching, redness, pain, increased skin temperatures, fever, or flu-like symptoms occur, contact your physician immediately for early treatment of a possible infection  Activity/Lifestyle  Gradually build up the duration and intensity of any activity or exercise  Take frequent rest periods during activity to allow for arm recovery  Monitor your arm and upper body during and after activity for any change in size, shape, tissue, texture, soreness, heaviness, or firmness  Avoid constriction of your arm on the side of your surgery  Avoid having blood pressure taken on the arm on the side of your surgery  Wear loose fitting jewelry and clothing  Be careful not to rest a heavy purse, luggage, or grocery bags on that arm  When you return to wearing a bra, make sure that it is well fitted and not too tight

## 2022-09-21 NOTE — DISCHARGE INSTRUCTIONS
POSTOPERATIVE INSTRUCTIONS FOLLOWING   MASTECTOMY AND/OR AXILLARY LYMPH NODE DISSECTION    The following are post-operative instructions that will help you to recover from your surgery.  Please read over these instructions carefully and contact us if we can answer any of your questions or concerns.    Dressing/breast binder (surgi-bra)  A surgical bra may be placed around your chest after your surgery.  If you are given the bra, please wear it as close to 24 hours a day as possible until your post-operative clinic appointment.  If the elastic around the bra irritates your skin, you may wear a soft t-shirt underneath the bra.    You may go without wearing the bra long enough to bath, to launder and dry the bra. If you have fluffy filler placed inside the bra, the filler should be removed whenever the bra is taken off. Please reinsert the fluffy filler, or insert the new soft filler, under the bra when you put the bra back on.  If the bra is extremely uncomfortable, you may wear a supportive sports bra instead after 2 days.    You may shower AFTER the drains are removed.  Please sponge bathe until then. Do not take a tub bath and do not soak the surgical site. Please do not remove the white strips of tape (steri-strips) that cover your incision.  They will be removed at your clinic visit.    Activity   You will be able to do much of your own personal care, such as bathing, dressing, preparing simple meals, etc.  A short walk each day will help with your recovery  You may find that you need to take rest breaks between activities, but you should not need to stay in bed for prolonged periods of time during the day  You may resume light household activities such as simple meal preparation, folding laundry, using your computer, and completing paperwork as you feel ready  Please avoid activities that require moderate to heavy lifting (grocery shopping) or pushing/pulling (vacuuming) and repetitive motions (such as washing  windows or long hours on the computer). Do not lift anything heavier than a gallon of milk.  A good rule during this time is to listen to your body, do what is comfortable, and stop and rest when your feel tired.  If it hurts, dont do it.  Following a lymph node dissection, dont avoid using your arm, but dont exercise your arm until after your first post-operative visit.  At your first post-op visit, you will be given arm exercises to regain movement and flexibility.  You may be referred to physical therapy.  You may restart driving when you are no longer on narcotics, your drain has been removed, and you feel safe turning the wheel and stopping quickly.  You will need to be out of work approximately 2-6 weeks depending on your particular surgery and how well you are recovering.  We will evaluate how you are doing at the first post-op appointment.  This is a good time to ask when you may return to work and what activities you may do.    Medication for pain  You will be given a prescription for pain medication. You should not drive or operate machinery while taking these.  Please take narcotics with food.  Narcotics can cause, or worse, constipation.  You will need to increase your fluid intake, eat high fiber foods (such as fruits and bran) and make sure that you are up and walking. You may need to take an over the counter stool softener for constipation.  An icepack may be helpful to decrease discomfort and swelling, particularly to the armpit after a lymph node dissection.  A small pillow positioned in the armpit may also decrease discomfort after a lymph node dissection.  If you are given a prescription for antibiotics, please continue to take them until the drains have been removed.    How to care for your Drain(s)  Wash hands--STRIP or milk the drainage tube as it comes out of your body toward the bulb.   Beginning where the drain comes out of your body, hold drainage tubing with one hand and with the  other, stretch and release tubing an inch at time while moving downward with both hands toward the bulb.  Do this 2-3 times before emptying the bulb.  Remove the stopper from the bulbs port  (drainage port)  Pour the drainage in the measuring cup provided by the nurse  Flatten/squeeze the bulb to create a vacuum and replace the stopper before letting go the of the bulb.  Record the date, time and amount of drainage in ccs (not ounces) each time bulb is emptied. If you have more than one drain, record each separately.  Discard the drainage into the toilet after measuring and then wash hands.  Empty bulbs 3 times/day or as needed if it fills up before 8 hours.  Remember to bring the output record with you to your doctors appointment.    Please report the following:  Temperature greater than 101 degrees  Discharge or bad odor from the wound  Excessive bleeding, such as bloody dressing or extreme bruising  Redness at incision and/or drain sites  Swelling or buildup of fluid around incision  If blue dye was used to locate your sentinel lymph nodes, your urine and stool may be blue-green in color for 1 or 2 days.  Anesthesia: After Your Surgery  Youve just had surgery. During surgery, you received medication called anesthesia to keep you comfortable and pain-free. After surgery, you may experience some pain or nausea. This is common. Here are some tips for feeling better and recovering after surgery.    Going home  Your doctor or nurse will show you how to take care of yourself when you go home. He or she will also answer your questions. Have an adult family member or friend drive you home. For the first 24 hours after your surgery:  Do not drive or use heavy equipment.  Do not make important decisions or sign legal documents.  Avoid alcohol.  Have someone stay with you, if needed. He or she can watch for problems and help keep you safe.  Take your time getting up from a seated or lying position. You may experience  dizziness for 24 hours  Be sure to keep all follow-up appointments with your doctor. And rest after your procedure for as long as your doctor tells you to.    Coping with pain  If you have pain after surgery, pain medication will help you feel better. Take it as directed, before pain becomes severe. Also, ask your doctor or pharmacist about other ways to control pain, such as with heat, ice, and relaxation. And follow any other instructions your surgeon or nurse gives you.    URINARY RETENTION  Should you experience a decrease in your urine output or are unable to urinate following surgery, this can be due to the medications given during surgery.  We recommend you going to the nearest Emergency Department.    Tips for taking pain medication  To get the best relief possible, remember these points:  Pain medications can upset your stomach. Taking them with a little food may help.  Most pain relievers taken by mouth need at least 20 to 30 minutes to take effect.  Taking medication on a schedule can help you remember to take it. Try to time your medication so that you can take it before beginning an activity, such as dressing, walking, or sitting down for dinner.  Constipation is a common side effect of pain medications. Contact your doctor before taking any medications like laxatives or stool softeners to help relieve constipation. Also ask about any dietary restrictions, because drinking lots of fluids and eating foods like fruits and vegetables that are high in fiber can also help. Remember, dont take laxatives unless your surgeon has prescribed them.  Mixing alcohol and pain medication can cause dizziness and slow your breathing. It can even be fatal. Dont drink alcohol while taking pain medication.  Pain medication can slow your reflexes. Dont drive or operate machinery while taking pain medication.  If your health care provider tells you to take acetaminophen to help relieve your pain, ask him or her how much  you are supposed to take each day. (Acetaminophen is the generic name for Tylenol and other brand-name pain relievers.) Acetaminophen or other pain relievers may interact with your prescription medicines or other over-the-counter (OTC) drugs. Some prescription medications contain acetaminophen along with other active ingredients. Using both prescription and OTC acetaminophen for pain can cause you to overdose. The FDA recommends that you read the labels on your OTC medications carefully. This will help you to clearly understand the list of active ingredients, dosing instructions, and any warnings. It may also help you avoid taking too much acetaminophen. If you have questions or don't understand the information, ask your pharmacist or health care provider to explain it to you before you take the OTC medication.    Managing nausea  Some people have an upset stomach after surgery. This is often due to anesthesia, pain, pain medications, or the stress of surgery. The following tips will help you manage nausea and get good nutrition as you recover. If you were on a special diet before surgery, ask your doctor if you should follow it during recovery. These tips may help:  Dont push yourself to eat. Your body will tell you when to eat and how much.  Start off with clear liquids and soup. They are easier to digest.  Progress to semi-solid foods (mashed potatoes, applesauce, and gelatin) as you feel ready.  Slowly move to solid foods. Dont eat fatty, rich, or spicy foods at first.  Dont force yourself to have three large meals a day. Instead, eat smaller amounts more often.  Take pain medications with a small amount of solid food, such as crackers or toast to avoid nausea.      Call your surgeon if    You feel too sleepy, dizzy, or groggy (medication may be too strong).  You have side effects like nausea, vomiting, or skin changes (rash, itching, or hives).   © 9682-1833 [employer]. [address] [place] [address]  Road, [place] [address]. All rights reserved. This information is not intended as a substitute for professional medical care. Always follow your healthcare professional's instructions.

## 2022-09-21 NOTE — ANESTHESIA POSTPROCEDURE EVALUATION
Anesthesia Post Evaluation    Patient: Malika Curtis    Procedure(s) Performed: Procedure(s) (LRB):  MASTECTOMY-Bilateral (Bilateral)  BIOPSY, LYMPH NODE, SENTINEL-Left (Left)  INJECTION, FOR SENTINEL NODE IDENTIFICATION-Left (Left)    Final Anesthesia Type: general      Patient location during evaluation: PACU  Patient participation: Yes- Able to Participate  Level of consciousness: awake and alert  Post-procedure vital signs: reviewed and stable  Pain management: adequate  Airway patency: patent  KAROLINE mitigation strategies: Extubation while patient is awake  PONV status at discharge: No PONV  Anesthetic complications: no      Cardiovascular status: hemodynamically stable  Respiratory status: unassisted  Hydration status: euvolemic  Follow-up not needed.          Vitals Value Taken Time   /58 09/21/22 1204   Temp 36.2 °C (97.1 °F) 09/21/22 1115   Pulse 87 09/21/22 1216   Resp 16 09/21/22 1200   SpO2 98 % 09/21/22 1216   Vitals shown include unvalidated device data.      No case tracking events are documented in the log.      Pain/Archana Score: Pain Rating Prior to Med Admin: 7 (9/21/2022 12:11 PM)  Archana Score: 9 (9/21/2022 11:45 AM)

## 2022-09-22 VITALS
DIASTOLIC BLOOD PRESSURE: 72 MMHG | WEIGHT: 151 LBS | HEART RATE: 88 BPM | OXYGEN SATURATION: 96 % | TEMPERATURE: 97 F | BODY MASS INDEX: 29.49 KG/M2 | RESPIRATION RATE: 16 BRPM | SYSTOLIC BLOOD PRESSURE: 122 MMHG

## 2022-09-24 ENCOUNTER — NURSE TRIAGE (OUTPATIENT)
Dept: ADMINISTRATIVE | Facility: CLINIC | Age: 57
End: 2022-09-24
Payer: COMMERCIAL

## 2022-09-24 ENCOUNTER — PATIENT MESSAGE (OUTPATIENT)
Dept: SURGERY | Facility: CLINIC | Age: 57
End: 2022-09-24
Payer: COMMERCIAL

## 2022-09-24 ENCOUNTER — CLINICAL SUPPORT (OUTPATIENT)
Dept: URGENT CARE | Facility: CLINIC | Age: 57
End: 2022-09-24
Payer: COMMERCIAL

## 2022-09-24 VITALS
WEIGHT: 151 LBS | RESPIRATION RATE: 18 BRPM | TEMPERATURE: 98 F | HEIGHT: 60 IN | BODY MASS INDEX: 29.64 KG/M2 | DIASTOLIC BLOOD PRESSURE: 86 MMHG | HEART RATE: 68 BPM | OXYGEN SATURATION: 98 % | SYSTOLIC BLOOD PRESSURE: 129 MMHG

## 2022-09-24 DIAGNOSIS — L03.313 CELLULITIS OF CHEST WALL: Primary | ICD-10-CM

## 2022-09-24 DIAGNOSIS — L53.9 ERYTHEMA: ICD-10-CM

## 2022-09-24 PROCEDURE — 99214 OFFICE O/P EST MOD 30 MIN: CPT | Mod: S$GLB,,, | Performed by: FAMILY MEDICINE

## 2022-09-24 PROCEDURE — 99214 PR OFFICE/OUTPT VISIT, EST, LEVL IV, 30-39 MIN: ICD-10-PCS | Mod: S$GLB,,, | Performed by: FAMILY MEDICINE

## 2022-09-24 RX ORDER — CEPHALEXIN 500 MG/1
500 CAPSULE ORAL EVERY 6 HOURS
Qty: 20 CAPSULE | Refills: 0 | Status: SHIPPED | OUTPATIENT
Start: 2022-09-24 | End: 2022-09-29

## 2022-09-24 NOTE — TELEPHONE ENCOUNTER
Pt calling s/p double mastectomy on 9/21/22 reports that at the drain insertion area to the L breast she has a very small amount of redness, reports only a few centimeters. Denies any bleeding, increased pain, or change in drainage/drainage around site. Per protocol advised to be seen within 24 hours. verbalized understanding. Denies any further questions or concerns at this time, advised to call back if they have any that come up. Advised pt to call back with any other concerns or worsening symptoms. Verbalized understanding and will route message to provider.       Reason for Disposition   [1] Small red area or streak AND [2] no fever    Additional Information   Negative: [1] Major abdominal surgical incision AND [2] wound gaping open AND [3] visible internal organs   Negative: Sounds like a life-threatening emergency to the triager   Negative: [1] Bleeding from incision AND [2] won't stop after 10 minutes of direct pressure   Negative: [1] Bleeding (more than a few drops) from incision AND [2] blood vessel surgery (e.g., carotid endarterectomy, femoral bypass graft, kidney dialysis fistula, tracheostomy)   Negative: [1] Widespread rash AND [2] bright red, sunburn-like   Negative: Severe pain in the incision   Negative: [1] Incision gaping open AND [2] < 48 hours since wound re-opened   Negative: [1] Incision gaping open AND [2] length of opening > 2 inches (5 cm)   Negative: Patient sounds very sick or weak to the triager   Negative: Sounds like a serious complication to the triager   Negative: Fever > 100.4 F (38.0 C)   Negative: [1] Incision looks infected (spreading redness, pain) AND [2] fever > 99.5 F (37.5 C)   Negative: [1] Incision looks infected (spreading redness, pain) AND [2] large red area (> 2 in. or 5 cm)   Negative: [1] Incision looks infected (spreading redness, pain) AND [2] face wound   Negative: [1] Red streak runs from the incision AND [2] longer than 1 inch (2.5 cm)   Negative: [1] Pus or  bad-smelling fluid draining from incision AND [2] no fever   Negative: [1] Post-op pain AND [2] not controlled with pain medications   Negative: Dressing soaked with blood or body fluid (e.g., drainage)   Negative: [1] Scant bleeding (e.g., few drops) from incision AND [2] blood vessel surgery (e.g., carotid endarterectomy, femoral bypass graft, kidney dialysis fistula   Negative: [1] Raised bruise and [2] size > 2 inches (5 cm) and expanding   Negative: [1] Caller has URGENT question AND [2] triager unable to answer question   Negative: [1] INCREASING pain in incision AND [2] > 2 days (48 hours) since surgery    Protocols used: Post-Op Incision Symptoms and [id number]

## 2022-09-24 NOTE — PROGRESS NOTES
Subjective:       Patient ID: Malika Curtis is a 57 y.o. female.    Vitals:  height is 5' (1.524 m) and weight is 68.5 kg (151 lb). Her tympanic temperature is 98.4 °F (36.9 °C). Her blood pressure is 129/86 and her pulse is 68. Her respiration is 18 and oxygen saturation is 98%.     Chief Complaint: Other Misc (Slight redness at Raghav Mendez drain site; left side of bilateral mastectomy on September 21.  No fever or bruising. - Entered by patient)    Patient stated she noticed the redness by her left drain site yesterday.  She has numbness to her left side.  She had a mastectomy 3 days ago and wants to make sure the drain site is not infected.  She denies any pain from the drain site.     Provider note begins below:  Past Medical History:  No date: Acid reflux  No date: Anxiety  No date: Bronchitis  No date: Hip bursitis, left  No date: Migraine   She noticed around her VIOLETTE drain on the left, she noticed this around 12 yesterday, she denies any worsening symptoms since then. denies any fever or chills, vss, she did take a norco yesterday for the pain. she came today because she is concerned about an infection. she had a bilateral mastectomy with wed morning.  9/21.         Other  This is a new problem. The current episode started yesterday. The problem has been unchanged. Associated symptoms include numbness (surgical site). Pertinent negatives include no abdominal pain, chest pain, chills, congestion, coughing, diaphoresis, fatigue, fever, headaches, nausea, rash or vomiting. Nothing aggravates the symptoms. She has tried nothing for the symptoms. The treatment provided no relief.     Constitution: Negative for activity change, appetite change, chills, sweating, fatigue, fever, unexpected weight change and generalized weakness.   HENT:  Negative for congestion.    Cardiovascular:  Negative for chest pain.   Respiratory:  Negative for cough.    Gastrointestinal:  Negative for abdominal pain, nausea and vomiting.    Skin:  Positive for erythema. Negative for rash.   Neurological:  Positive for numbness (surgical site). Negative for headaches.     Objective:      Physical Exam   Constitutional: She is oriented to person, place, and time. She appears well-developed.  Non-toxic appearance. She does not appear ill. No distress.      Comments: present.      HENT:   Head: Normocephalic and atraumatic.   Ears:   Right Ear: External ear normal.   Left Ear: External ear normal.   Nose: Nose normal.   Mouth/Throat: Oropharynx is clear and moist.   Eyes: Conjunctivae, EOM and lids are normal. Pupils are equal, round, and reactive to light.   Neck: Trachea normal and phonation normal. Neck supple.   Musculoskeletal: Normal range of motion.         General: Normal range of motion.   Neurological: She is alert and oriented to person, place, and time.   Skin: Skin is warm, dry, intact and not diaphoretic. erythema        Psychiatric: Her speech is normal and behavior is normal. Judgment and thought content normal.   Nursing note and vitals reviewed.        Assessment:       1. Cellulitis of chest wall    2. Erythema          Plan:       Fu 2-3 days for wound check  Keflex    Discussed results/diagnosis/plan with patient in clinic. Strict precautions given to patient to monitor for worsening signs and symptoms. Advised to follow up with PCP or specialist.    Explained side effects of medications prescribed with patient and informed him/her to discontinue use if he/she has any side effects and to inform UC or PCP if this occurs. All questions answered. Strict ED verses clinic return precautions stressed and given in depth. Advised if symptoms worsens of fail to improve he/she should go to the Emergency Room. Discharge and follow-up instructions given verbally/printed with the patient who expressed understanding and willingness to comply with my recommendations. Patient voiced understanding and in agreement with current treatment plan.  Patient exits the exam room in no acute distress. Conversant and engaged during discharge discussion, verbalized understanding.      30 minutes spent on patient's encounter. This includes face to face time and non-face to face time preparing to see the patient (eg, review of tests), obtaining and/or reviewing separately obtained history, documenting clinical information in the electronic or other health record, independently interpreting results and communicating results to the patient/family/caregiver, or care coordinator.    Cellulitis of chest wall  -     cephALEXin (KEFLEX) 500 MG capsule; Take 1 capsule (500 mg total) by mouth every 6 (six) hours. for 5 days  Dispense: 20 capsule; Refill: 0    Erythema         Medical Decision Making:   History:   Old Medical Records: I decided to obtain old medical records.  Old Records Summarized: records from clinic visits, records from previous admission(s) and records from another hospital.  Other:   I have discussed this case with another health care provider.       <> Summary of the Discussion: Case discussed with Dr. Stahl, she reviewed images, advised on Keflex, and advised to remove transparent dressings and Biopatch.  Discussed with patient who agreed with plan.  Advised on close follow-up.       Patient Instructions   General Discharge Instructions   PLEASE READ YOUR DISCHARGE INSTRUCTIONS ENTIRELY AS IT CONTAINS IMPORTANT INFORMATION.  If you were prescribed a narcotic or controlled medication, do not drive or operate heavy equipment or machinery while taking these medications.  If you were prescribed antibiotics, please take them to completion.  You must understand that you've received an Urgent Care treatment only and that you may be released before all your medical problems are known or treated. You, the patient, will arrange for follow up care as instructed.    OVER THE COUNTER RECOMMENDATIONS/SUGGESTIONS.    Make sure to stay well hydrated.    Use Nasal  Saline to mechanically move any post nasal drip from your eustachian tube or from the back of your throat.    Use warm salt water gargles to ease your throat pain. Warm salt water gargles as needed for sore throat- 1/2 tsp salt to 1 cup warm water, gargle as desired.    Use an antihistamine such as Claritin, Zyrtec or Allegra to dry you out.    Use pseudoephedrine (behind the counter) to decongest. Pseudoephedrine 30 mg up to 240 mg /day. It can raise your blood pressure and give you palpitations.    Use mucinex (guaifenesin) to break up mucous up to 2400mg/day to loosen any mucous.    The mucinex DM pill has a cough suppressant that can be sedating. It can be used at night to stop the tickle at the back of your throat.    You can use Mucinex D (it has guaifenesin and a high dose of pseudoephedrine) in the mornings to help decongest.    Use Afrin in each nare for no longer than 3 days, as it is addictive. It can also dry out your mucous membranes and cause elevated blood pressure. This is especially useful if you are flying.    Use Flonase 1-2 sprays/nostril per day. It is a local acting steroid nasal spray, if you develop a bloody nose, stop using the medication immediately.    Sometimes Nyquil at night is beneficial to help you get some rest, however it is sedating and it does have an antihistamine, and tylenol.    Honey is a natural cough suppressant that can be used.    Tylenol up to 4,000 mg a day is safe for short periods and can be used for body aches, pain, and fever. However in high doses and prolonged use it can cause liver irritation.    Ibuprofen is a non-steroidal anti-inflammatory that can be used for body aches, pain, and fever.However it can also cause stomach irritation if over used.     Follow up with your PCP or specialty clinic as instructed in the next 2-3 days if not improved or as needed. You can call [phone] to schedule an appointment with appropriate provider.      If you condition  worsens, we recommend that you receive another evaluation at the emergency room immediately or contact your primary medical clinic's after hours call service to discuss your concerns.      Please return here or go to the Emergency Department for any concerns or worsening condition.   You can also call [phone] to schedule an appointment with the appropriate provider.    Please return here or go to the Emergency Department for any concerns or worsening of condition.    Thank you for choosing [facility]!    Our goal in the Urgent Care is to always provide outstanding medical care. You may receive a survey by mail or e-mail in the next week regarding your experience today. We would greatly appreciate you completing and returning the survey. Your feedback provides us with a way to recognize our staff who provide very good care, and it helps us learn how to improve when your experience was below our aspiration of excellence.      We appreciate you trusting us with your medical care. We hope you feel better soon. We will be happy to take care of you for all of your future medical needs.    Sincerely,    MARI Hurley

## 2022-09-24 NOTE — PATIENT INSTRUCTIONS
General Discharge Instructions   PLEASE READ YOUR DISCHARGE INSTRUCTIONS ENTIRELY AS IT CONTAINS IMPORTANT INFORMATION.  If you were prescribed a narcotic or controlled medication, do not drive or operate heavy equipment or machinery while taking these medications.  If you were prescribed antibiotics, please take them to completion.  You must understand that you've received an Urgent Care treatment only and that you may be released before all your medical problems are known or treated. You, the patient, will arrange for follow up care as instructed.    OVER THE COUNTER RECOMMENDATIONS/SUGGESTIONS.    Make sure to stay well hydrated.    Use Nasal Saline to mechanically move any post nasal drip from your eustachian tube or from the back of your throat.    Use warm salt water gargles to ease your throat pain. Warm salt water gargles as needed for sore throat- 1/2 tsp salt to 1 cup warm water, gargle as desired.    Use an antihistamine such as Claritin, Zyrtec or Allegra to dry you out.    Use pseudoephedrine (behind the counter) to decongest. Pseudoephedrine 30 mg up to 240 mg /day. It can raise your blood pressure and give you palpitations.    Use mucinex (guaifenesin) to break up mucous up to 2400mg/day to loosen any mucous.    The mucinex DM pill has a cough suppressant that can be sedating. It can be used at night to stop the tickle at the back of your throat.    You can use Mucinex D (it has guaifenesin and a high dose of pseudoephedrine) in the mornings to help decongest.    Use Afrin in each nare for no longer than 3 days, as it is addictive. It can also dry out your mucous membranes and cause elevated blood pressure. This is especially useful if you are flying.    Use Flonase 1-2 sprays/nostril per day. It is a local acting steroid nasal spray, if you develop a bloody nose, stop using the medication immediately.    Sometimes Nyquil at night is beneficial to help you get some rest, however it is sedating and it  does have an antihistamine, and tylenol.    Honey is a natural cough suppressant that can be used.    Tylenol up to 4,000 mg a day is safe for short periods and can be used for body aches, pain, and fever. However in high doses and prolonged use it can cause liver irritation.    Ibuprofen is a non-steroidal anti-inflammatory that can be used for body aches, pain, and fever.However it can also cause stomach irritation if over used.     Follow up with your PCP or specialty clinic as instructed in the next 2-3 days if not improved or as needed. You can call [phone] to schedule an appointment with appropriate provider.      If you condition worsens, we recommend that you receive another evaluation at the emergency room immediately or contact your primary medical clinic's after hours call service to discuss your concerns.      Please return here or go to the Emergency Department for any concerns or worsening condition.   You can also call [phone] to schedule an appointment with the appropriate provider.    Please return here or go to the Emergency Department for any concerns or worsening of condition.    Thank you for choosing [facility]!    Our goal in the Urgent Care is to always provide outstanding medical care. You may receive a survey by mail or e-mail in the next week regarding your experience today. We would greatly appreciate you completing and returning the survey. Your feedback provides us with a way to recognize our staff who provide very good care, and it helps us learn how to improve when your experience was below our aspiration of excellence.      We appreciate you trusting us with your medical care. We hope you feel better soon. We will be happy to take care of you for all of your future medical needs.    Sincerely,    MARI Hurley

## 2022-09-26 ENCOUNTER — TELEPHONE (OUTPATIENT)
Dept: SURGERY | Facility: CLINIC | Age: 57
End: 2022-09-26
Payer: COMMERCIAL

## 2022-09-26 ENCOUNTER — TELEPHONE (OUTPATIENT)
Dept: HEMATOLOGY/ONCOLOGY | Facility: CLINIC | Age: 57
End: 2022-09-26
Payer: COMMERCIAL

## 2022-09-26 NOTE — TELEPHONE ENCOUNTER
Spoke to patient, explained its just an appt with Dr Harper and that either could come with her. Patient verbalized understanding and thanked nurse.

## 2022-09-26 NOTE — TELEPHONE ENCOUNTER
Spoke with patient regarding POC. Pt states site is no worse than yesterday. Advised patient to follow up with Dr. Stahl at her post-op appt next week. Pt to call office if any changes noted.

## 2022-09-27 ENCOUNTER — OFFICE VISIT (OUTPATIENT)
Dept: HEMATOLOGY/ONCOLOGY | Facility: CLINIC | Age: 57
End: 2022-09-27
Payer: COMMERCIAL

## 2022-09-27 VITALS
SYSTOLIC BLOOD PRESSURE: 126 MMHG | HEART RATE: 103 BPM | RESPIRATION RATE: 18 BRPM | DIASTOLIC BLOOD PRESSURE: 84 MMHG | HEIGHT: 60 IN | BODY MASS INDEX: 28.16 KG/M2 | TEMPERATURE: 98 F | WEIGHT: 143.44 LBS | OXYGEN SATURATION: 98 %

## 2022-09-27 DIAGNOSIS — C50.412 MALIGNANT NEOPLASM OF UPPER-OUTER QUADRANT OF LEFT BREAST IN FEMALE, ESTROGEN RECEPTOR POSITIVE: Primary | ICD-10-CM

## 2022-09-27 DIAGNOSIS — Z17.0 MALIGNANT NEOPLASM OF UPPER-OUTER QUADRANT OF LEFT BREAST IN FEMALE, ESTROGEN RECEPTOR POSITIVE: Primary | ICD-10-CM

## 2022-09-27 PROCEDURE — 3079F DIAST BP 80-89 MM HG: CPT | Mod: CPTII,S$GLB,, | Performed by: INTERNAL MEDICINE

## 2022-09-27 PROCEDURE — 99214 OFFICE O/P EST MOD 30 MIN: CPT | Mod: S$GLB,,, | Performed by: INTERNAL MEDICINE

## 2022-09-27 PROCEDURE — 1159F MED LIST DOCD IN RCRD: CPT | Mod: CPTII,S$GLB,, | Performed by: INTERNAL MEDICINE

## 2022-09-27 PROCEDURE — 3008F BODY MASS INDEX DOCD: CPT | Mod: CPTII,S$GLB,, | Performed by: INTERNAL MEDICINE

## 2022-09-27 PROCEDURE — 3008F PR BODY MASS INDEX (BMI) DOCUMENTED: ICD-10-PCS | Mod: CPTII,S$GLB,, | Performed by: INTERNAL MEDICINE

## 2022-09-27 PROCEDURE — 3074F SYST BP LT 130 MM HG: CPT | Mod: CPTII,S$GLB,, | Performed by: INTERNAL MEDICINE

## 2022-09-27 PROCEDURE — 3079F PR MOST RECENT DIASTOLIC BLOOD PRESSURE 80-89 MM HG: ICD-10-PCS | Mod: CPTII,S$GLB,, | Performed by: INTERNAL MEDICINE

## 2022-09-27 PROCEDURE — 99999 PR PBB SHADOW E&M-EST. PATIENT-LVL IV: CPT | Mod: PBBFAC,,, | Performed by: INTERNAL MEDICINE

## 2022-09-27 PROCEDURE — 3074F PR MOST RECENT SYSTOLIC BLOOD PRESSURE < 130 MM HG: ICD-10-PCS | Mod: CPTII,S$GLB,, | Performed by: INTERNAL MEDICINE

## 2022-09-27 PROCEDURE — 1159F PR MEDICATION LIST DOCUMENTED IN MEDICAL RECORD: ICD-10-PCS | Mod: CPTII,S$GLB,, | Performed by: INTERNAL MEDICINE

## 2022-09-27 PROCEDURE — 99999 PR PBB SHADOW E&M-EST. PATIENT-LVL IV: ICD-10-PCS | Mod: PBBFAC,,, | Performed by: INTERNAL MEDICINE

## 2022-09-27 PROCEDURE — 99214 PR OFFICE/OUTPT VISIT, EST, LEVL IV, 30-39 MIN: ICD-10-PCS | Mod: S$GLB,,, | Performed by: INTERNAL MEDICINE

## 2022-09-27 NOTE — PROGRESS NOTES
Subjective:       Patient ID: Malika Curtis is a 57 y.o. female.    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 Curtis returns today for follow-up.  I had seen her once, on August 23, 2022.  Since then she underwent bilateral mastectomies with reconstruction on September 21, 2022.  The pathology report is not available for me to review    Briefly, she is a 57-year-old female with a recent diagnosis of breast cancer referred for evaluation.  Apparently she had an abnormal mammogram on 05/23/2022 that showed a focal asymmetry at the upper outer anterior position of the left breast.  A diagnostic mammogram and ultrasound on 06/22/2022 showed a 6.6 cm area of calcifications.  The ultrasound showed an irregular hypoechoic mass with angular and indistinct margins measuring 1.5 cm.    A biopsy was performed on 08/08/2022.  The area of calcifications showed DCIS, low grade that was ER positive and NY positive.  The mass at the 2 o'clock position showed a grade 1 invasive ductal carcinoma measuring at least 9 mm which was ER strongly positive, NY strongly positive, HER2 negative with a Ki-67 of 8%           ROS:  Overall she feels well. She is somewhat anxious and she is sore from the surgery and the 2 drainage tubes that are in place.  She denies any depression, easy bruising, fevers, chills, night  sweats, weight loss, nausea, vomiting, diarrhea, constipation, diplopia, blurred vision, headache, palpitations, shortness of breath, abdominal pain, extremity pain, or difficulty ambulating.  The remainder of the ten-point ROS, including general, skin, lymph, H/N, cardiorespiratory, GI, , Neuro, Endocrine, and psychiatric is negative.     Objective:      Physical Exam      She is alert, oriented to time, place, person, pleasant, well      nourished, in no acute physical distress.                                    VITAL SIGNS:  Reviewed                                       HEENT:  Normal.  There are no nasal, oral, lip, gingival, auricular, lid,    or conjunctival lesions.  Mucosae are moist and pink, and there is no        thrush.  Pupils are equal, reactive to light and accommodation.              Extraocular muscle movements are intact.  Dentition is good.  There is no frontal or maxillary tenderness.                                     NECK:  Supple without JVD, adenopathy, or thyromegaly.                       LUNGS:  Clear to auscultation without wheezing, rales, or rhonchi.           CARDIOVASCULAR:  Reveals an S1, S2, no murmurs, no rubs, no gallops.         ABDOMEN:  Soft, nontender, without organomegaly.  Bowel sounds are    present.                                                                     EXTREMITIES:  No cyanosis, clubbing, or edema.                               BREASTS:   She is status post bilateral mastectomies with 2 drainage tubes in place.  Her incisions are healing nicely.                                   LYMPHATIC:  There is no cervical, axillary, or supraclavicular adenopathy.   SKIN:  Warm and moist, without petechiae, rashes, induration, or ecchymoses.           NEUROLOGIC:  DTRs are 0-1+ bilaterally, symmetrical, motor function is 5/5,  and cranial nerves are  within normal limits.    Assessment:       1.  Grade 1 IDCA, left breast, ER strongly positive, WV positive, and HER2 negative, now status post bilateral mastectomy  Plan:         I would a very long discussion with Mrs. Curtis .  I asked her to return in see me in 2 weeks.  Hopefully the pathology report will be available by then and can make a decision whether an Oncotype is needed prior to make treatment recommendations.    Her multiple questions were answered to her satisfaction.     Route Chart for Scheduling    Med Onc Chart Routing      Follow up with physician 2 weeks. See me in 2 weeks   Follow up with ANDREA    Infusion scheduling note    Injection scheduling note    Labs    Imaging     Pharmacy appointment    Other referrals

## 2022-09-29 ENCOUNTER — PATIENT MESSAGE (OUTPATIENT)
Dept: SURGERY | Facility: CLINIC | Age: 57
End: 2022-09-29
Payer: COMMERCIAL

## 2022-09-29 RX ORDER — HYDROCODONE BITARTRATE AND ACETAMINOPHEN 5; 325 MG/1; MG/1
1 TABLET ORAL EVERY 6 HOURS PRN
Qty: 8 TABLET | Refills: 0 | Status: SHIPPED | OUTPATIENT
Start: 2022-09-29 | End: 2023-05-22

## 2022-10-03 NOTE — PROGRESS NOTES
[facility]       Post-Op        REFERRING PHYSICIAN:  No referring provider defined for this encounter.       Primary Doctor No    MEDICAL ONCOLOGIST:    Dr. Mcnair  RADIATION ONCOLOGIST:       DIAGNOSIS:    This is a 57 y.o. female with a stage pT2 N0 M0 grade 2 ER + AK + HER2 - invasive ductal carcinoma of the left breast.    TREATMENT SUMMARY:  The patient is status post bilateral total simple mastectomy and sentinel node biopsy on 9/21/2022.  Final pathology showed invasive ductal carcinoma on the left breast, Grade 2, 30mm in maximal dimension. Margins negative for malignancy, all margins greater than 10mm away. Skin, nipple and skeletal muscle uninvolved by tumor.  DCIS also seen, Grade 1, cribiform type, without necrosis. One left axillary lymph node examined, negative for malignancy. Right breast mastectomy showed benign breast tissue with columnar cell change, hyperplasia and fibrodenomatoid changes.     ER+ AK+ HER2 -    1. Breast, left, mastectomy:   - Invasive ductal carcinoma, Aidan grade 2   - Tumor measures 30 mm in maximal dimension   - Ductal carcinoma in situ, low nuclear grade, cribriform type   - Margins of resection are negative for malignancy   - Biopsy site changes and clips are identified grossly   - Please see SYNOPTIC REPORT below   2. Lymph node, left axillary, sentinel, excision:   - One lymph node negative for malignancy (0/1)   - Immunostains were performed with appropriately reactive controls and the   lymph node is negative for AE1/AE3, WSK, and CAM5.2, supporting the above   interpretation.   3. Breast, right, mastectomy:   - Benign breast tissue with columnar cell change and hyperplasia and   fibroadenomatoid changes   - Nipple areolar complex with no significant histopathologic abnormality   - Immunostains for ER and CK5/6 support the above interpretation (controls   appropriate)   - Margins of resection are negative for malignancy   SYNOPTIC REPORT    SPECIMEN, LATERALITY, PROCEDURE: Breast, left, mastectomy   HISTOLOGIC TYPE:  Invasive ductal carcinoma   HISTOLOGIC GRADE: Grade 2        Tubules 3        Nuclei 2        Mitosis 1   TUMOR FOCALITY:  Single focus   TUMOR SIZE:  30 mm   DUCTAL CARCINOMA IN SITU:  Present in 2 of 21 slides examined; comprising   less than 5% of the total tumor burden (EIC negative)        Nuclear grade:  Grade 1 (low nuclear grade)        Necrosis:  Not identified        Architecture:  Cribriform type   TUMOR EXTENSION:        Skin:  Uninvolved by tumor        Nipple:  Uninvolved by tumor        Skeletal muscle:  Uninvolved by tumor   MARGINS:        Invasive carcinoma:  All margins are greater than 10 mm away        Ductal carcinoma in situ:  All margins are greater than 10 mm away   LYMPH NODES:        Total number of nodes examined: 1        Total number of sentinel nodes examined: 1        Total number of lymph nodes involved: 0   LYMPHOVASCULAR INVASION:  Not identified   TREATMENT EFFECT (BREAST): No known presurgical therapy   TREATMENT EFFECT (LYMPH NODE): No known presurgical therapy   TUMOR MARKERS:  Tumor markers were performed on previous biopsy specimen   ([id number]) and per report the tumor cells are positive for estrogen   receptor (3+, %) and progesterone receptor (3+, 90%) and negative for   HER2 overexpression   ADDITIONAL FINDINGS:  Biopsy site changes and clips identified; immunostain   for p63 and ecadherin support the above interpretation (controls appropriate)   PATHOLOGIC STAGE: pT2 pN0(sn)     INTERVAL HISTORY:   Malika Curtis comes in for a post-op check.  She denies fever, chills, chest pain or shortness of breath.  Her pain is well controlled, with some episode of pain last night, controlled with medication. Patient has charted her drain outbut bilaterally since surgery, at the moment her last charted output was 14ml on left and 12ml on right on Monday night, night prior was 20ml L and 10 ml  "right. She states she is uncomfortable reaching over her head or taking laundry out of washer as she is afraid if she overextends she might "pop her stitches". She states she is interested in PT, as she would like to gradually exercise until she feels comfortable with upper arm movements.     MEDICATIONS:  Current Outpatient Medications   Medication Sig Dispense Refill    albuterol (PROVENTIL) 2.5 mg /3 mL (0.083 %) nebulizer solution Take 2.5 mg by nebulization every 4 (four) hours as needed.      buPROPion (WELLBUTRIN) 75 MG tablet Take 150 mg by mouth once daily at 6am.      butalbital-acetaminophen-caffeine -40 mg (FIORICET, ESGIC) -40 mg per tablet Take 1 tablet by mouth every 6 (six) hours as needed.      cetirizine (ZYRTEC) 10 MG tablet Take 10 mg by mouth once daily.      diazePAM (VALIUM) 5 MG tablet Take 5 mg by mouth daily as needed.      HYDROcodone-acetaminophen (NORCO) 5-325 mg per tablet Take 1 tablet by mouth every 6 (six) hours as needed for Pain. 8 tablet 0    meloxicam (MOBIC) 15 MG tablet Take 1 tablet by mouth once daily 30 tablet 0    multivitamin capsule Take 1 capsule by mouth once daily.      UNABLE TO FIND Take by mouth once daily. tumeric       No current facility-administered medications for this visit.       ALLERGIES:   Review of patient's allergies indicates:   Allergen Reactions    Sulfa (sulfonamide antibiotics) Hives       PHYSICAL EXAMINATION:   /77 (BP Location: Left arm, Patient Position: Sitting, BP Method: Small (Automatic))   Pulse 86   Ht 5' (1.524 m)   Wt 64.9 kg (143 lb)   BMI 27.93 kg/m²   General:  This is a well appearing female with appropriate speech, affect and gait.     Breast:  Mastectomy incision bilaterally  clean, dry, and intact, drain with less than 10 ml of serous output each at time of examination, last drained this morning    IMPRESSION:   The patient has had an uneventful postoperative course.    PLAN:   1. return in 6 months for a " follow up office visit and chest exam  2. Bilateral drain removal  3. The patient is advised in continued exam of the breast chest wall and to report to this office sooner should she note any areas of abnormality or concern.   4.  She has been instructed to meet with med onc and rad onc for discussion of adjuvant treatment recommendations, Appt with Dr. Mcnair on Oct 12  5. Interested in physical therapy, requested exercise recommendations

## 2022-10-04 ENCOUNTER — DOCUMENTATION ONLY (OUTPATIENT)
Dept: HEMATOLOGY/ONCOLOGY | Facility: CLINIC | Age: 57
End: 2022-10-04
Payer: COMMERCIAL

## 2022-10-04 ENCOUNTER — OFFICE VISIT (OUTPATIENT)
Dept: SURGERY | Facility: CLINIC | Age: 57
End: 2022-10-04
Payer: COMMERCIAL

## 2022-10-04 VITALS
WEIGHT: 143 LBS | BODY MASS INDEX: 28.07 KG/M2 | DIASTOLIC BLOOD PRESSURE: 77 MMHG | HEIGHT: 60 IN | HEART RATE: 86 BPM | SYSTOLIC BLOOD PRESSURE: 125 MMHG

## 2022-10-04 DIAGNOSIS — C50.612 CARCINOMA OF AXILLARY TAIL OF LEFT BREAST IN FEMALE, ESTROGEN RECEPTOR POSITIVE: Primary | ICD-10-CM

## 2022-10-04 DIAGNOSIS — C50.412 MALIGNANT NEOPLASM OF UPPER-OUTER QUADRANT OF LEFT BREAST IN FEMALE, ESTROGEN RECEPTOR POSITIVE: Primary | ICD-10-CM

## 2022-10-04 DIAGNOSIS — Z17.0 CARCINOMA OF AXILLARY TAIL OF LEFT BREAST IN FEMALE, ESTROGEN RECEPTOR POSITIVE: Primary | ICD-10-CM

## 2022-10-04 DIAGNOSIS — Z17.0 MALIGNANT NEOPLASM OF UPPER-OUTER QUADRANT OF LEFT BREAST IN FEMALE, ESTROGEN RECEPTOR POSITIVE: Primary | ICD-10-CM

## 2022-10-04 DIAGNOSIS — C50.412 MALIGNANT NEOPLASM OF UPPER-OUTER QUADRANT OF LEFT BREAST IN FEMALE, ESTROGEN RECEPTOR POSITIVE: ICD-10-CM

## 2022-10-04 DIAGNOSIS — Z17.0 MALIGNANT NEOPLASM OF UPPER-OUTER QUADRANT OF LEFT BREAST IN FEMALE, ESTROGEN RECEPTOR POSITIVE: ICD-10-CM

## 2022-10-04 PROCEDURE — 99999 PR PBB SHADOW E&M-EST. PATIENT-LVL III: CPT | Mod: PBBFAC,,, | Performed by: SURGERY

## 2022-10-04 PROCEDURE — 1159F PR MEDICATION LIST DOCUMENTED IN MEDICAL RECORD: ICD-10-PCS | Mod: CPTII,S$GLB,, | Performed by: SURGERY

## 2022-10-04 PROCEDURE — 3008F BODY MASS INDEX DOCD: CPT | Mod: CPTII,S$GLB,, | Performed by: SURGERY

## 2022-10-04 PROCEDURE — 3074F PR MOST RECENT SYSTOLIC BLOOD PRESSURE < 130 MM HG: ICD-10-PCS | Mod: CPTII,S$GLB,, | Performed by: SURGERY

## 2022-10-04 PROCEDURE — 3008F PR BODY MASS INDEX (BMI) DOCUMENTED: ICD-10-PCS | Mod: CPTII,S$GLB,, | Performed by: SURGERY

## 2022-10-04 PROCEDURE — 99024 PR POST-OP FOLLOW-UP VISIT: ICD-10-PCS | Mod: S$GLB,,, | Performed by: SURGERY

## 2022-10-04 PROCEDURE — 99024 POSTOP FOLLOW-UP VISIT: CPT | Mod: S$GLB,,, | Performed by: SURGERY

## 2022-10-04 PROCEDURE — 3078F DIAST BP <80 MM HG: CPT | Mod: CPTII,S$GLB,, | Performed by: SURGERY

## 2022-10-04 PROCEDURE — 1160F PR REVIEW ALL MEDS BY PRESCRIBER/CLIN PHARMACIST DOCUMENTED: ICD-10-PCS | Mod: CPTII,S$GLB,, | Performed by: SURGERY

## 2022-10-04 PROCEDURE — 99999 PR PBB SHADOW E&M-EST. PATIENT-LVL III: ICD-10-PCS | Mod: PBBFAC,,, | Performed by: SURGERY

## 2022-10-04 PROCEDURE — 3074F SYST BP LT 130 MM HG: CPT | Mod: CPTII,S$GLB,, | Performed by: SURGERY

## 2022-10-04 PROCEDURE — 1160F RVW MEDS BY RX/DR IN RCRD: CPT | Mod: CPTII,S$GLB,, | Performed by: SURGERY

## 2022-10-04 PROCEDURE — 3078F PR MOST RECENT DIASTOLIC BLOOD PRESSURE < 80 MM HG: ICD-10-PCS | Mod: CPTII,S$GLB,, | Performed by: SURGERY

## 2022-10-04 PROCEDURE — 1159F MED LIST DOCD IN RCRD: CPT | Mod: CPTII,S$GLB,, | Performed by: SURGERY

## 2022-10-04 NOTE — PROGRESS NOTES
Per , Oncotype ordered, #[id number]. Already scheduled for follow up with . No other needs voiced at this time.  Oncology Navigation   Intake  Date of Diagnosis: 08/08/22  Cancer Type: Breast  Internal / External Referral: Internal  Referral Source: Elías  Date of Referral: 08/08/22  Initial Nurse Navigator Contact: 08/23/22  Date Worked: 10/04/22  First Appointment Available: 08/23/22  Appointment Date: 08/23/22  First Available Date vs. Scheduled Date (days): 0  Multiple appointments: Yes (Stiven Christensen)     Treatment  Current Status: Active    Surgery: Planned  Surgical Oncologist: Favio  Type of Surgery: Bilateral mastectomy with SLNB and no recon  Consult Date: 08/23/22  Surgery Schedule Date: 09/21/22    Medical Oncologist: Leroy  Consult Date: 08/23/22       Procedures: Genomic testing  Biopsy Schedule Date: 08/08/23  Genetic Testing Date Sent: 08/23/22  Mammo Schedule Date: 08/17/22  Genomic Testing Date Sent: 10/04/22  Ultrasound Schedule Date: 08/17/22       ER: Positive  AZ: Positive  Her2: Negative       Support Systems: Spouse/significant other     Acuity      Follow Up  No follow-ups on file.

## 2022-10-11 ENCOUNTER — PATIENT MESSAGE (OUTPATIENT)
Dept: HEMATOLOGY/ONCOLOGY | Facility: CLINIC | Age: 57
End: 2022-10-11
Payer: COMMERCIAL

## 2022-10-12 ENCOUNTER — OFFICE VISIT (OUTPATIENT)
Dept: HEMATOLOGY/ONCOLOGY | Facility: CLINIC | Age: 57
End: 2022-10-12
Payer: COMMERCIAL

## 2022-10-12 VITALS
RESPIRATION RATE: 18 BRPM | HEART RATE: 83 BPM | WEIGHT: 143.94 LBS | SYSTOLIC BLOOD PRESSURE: 115 MMHG | DIASTOLIC BLOOD PRESSURE: 70 MMHG | HEIGHT: 60 IN | OXYGEN SATURATION: 98 % | BODY MASS INDEX: 28.26 KG/M2

## 2022-10-12 DIAGNOSIS — Z17.0 CARCINOMA OF AXILLARY TAIL OF LEFT BREAST IN FEMALE, ESTROGEN RECEPTOR POSITIVE: Primary | ICD-10-CM

## 2022-10-12 DIAGNOSIS — C50.612 CARCINOMA OF AXILLARY TAIL OF LEFT BREAST IN FEMALE, ESTROGEN RECEPTOR POSITIVE: Primary | ICD-10-CM

## 2022-10-12 PROCEDURE — 3078F DIAST BP <80 MM HG: CPT | Mod: CPTII,S$GLB,, | Performed by: INTERNAL MEDICINE

## 2022-10-12 PROCEDURE — 1159F MED LIST DOCD IN RCRD: CPT | Mod: CPTII,S$GLB,, | Performed by: INTERNAL MEDICINE

## 2022-10-12 PROCEDURE — 3078F PR MOST RECENT DIASTOLIC BLOOD PRESSURE < 80 MM HG: ICD-10-PCS | Mod: CPTII,S$GLB,, | Performed by: INTERNAL MEDICINE

## 2022-10-12 PROCEDURE — 99999 PR PBB SHADOW E&M-EST. PATIENT-LVL III: ICD-10-PCS | Mod: PBBFAC,,, | Performed by: INTERNAL MEDICINE

## 2022-10-12 PROCEDURE — 3008F PR BODY MASS INDEX (BMI) DOCUMENTED: ICD-10-PCS | Mod: CPTII,S$GLB,, | Performed by: INTERNAL MEDICINE

## 2022-10-12 PROCEDURE — 1159F PR MEDICATION LIST DOCUMENTED IN MEDICAL RECORD: ICD-10-PCS | Mod: CPTII,S$GLB,, | Performed by: INTERNAL MEDICINE

## 2022-10-12 PROCEDURE — 99215 OFFICE O/P EST HI 40 MIN: CPT | Mod: S$GLB,,, | Performed by: INTERNAL MEDICINE

## 2022-10-12 PROCEDURE — 3074F PR MOST RECENT SYSTOLIC BLOOD PRESSURE < 130 MM HG: ICD-10-PCS | Mod: CPTII,S$GLB,, | Performed by: INTERNAL MEDICINE

## 2022-10-12 PROCEDURE — 99999 PR PBB SHADOW E&M-EST. PATIENT-LVL III: CPT | Mod: PBBFAC,,, | Performed by: INTERNAL MEDICINE

## 2022-10-12 PROCEDURE — 99215 PR OFFICE/OUTPT VISIT, EST, LEVL V, 40-54 MIN: ICD-10-PCS | Mod: S$GLB,,, | Performed by: INTERNAL MEDICINE

## 2022-10-12 PROCEDURE — 3008F BODY MASS INDEX DOCD: CPT | Mod: CPTII,S$GLB,, | Performed by: INTERNAL MEDICINE

## 2022-10-12 PROCEDURE — 3074F SYST BP LT 130 MM HG: CPT | Mod: CPTII,S$GLB,, | Performed by: INTERNAL MEDICINE

## 2022-10-12 NOTE — PROGRESS NOTES
Subjective:       Patient ID: Malika Curtis is a 57 y.o. female.    Chief Complaint: No chief complaint on file.    HPI     Mrs. Curtis returns today for follow-up.  I had seen her initially August 23, 2022 and again on September 27th.  She underwent bilateral mastectomies with reconstruction on September 21, 2022.  The pathology report indicates that she had a 30 mm left-sided tumor.  Resection margins were clear while the sentinel lymph node was negative.  There was no malignancy identified in the right mastectomy specimen.  An Oncotype has been requested.    Briefly, she is a 57-year-old female with a recent diagnosis of breast cancer referred for evaluation.  Apparently she had an abnormal mammogram on 05/23/2022 that showed a focal asymmetry at the upper outer anterior position of the left breast.  A diagnostic mammogram and ultrasound on 06/22/2022 showed a 6.6 cm area of calcifications.  The ultrasound showed an irregular hypoechoic mass with angular and indistinct margins measuring 1.5 cm.    A biopsy was performed on 08/08/2022.  The area of calcifications showed DCIS, low grade that was ER positive and NE positive.  The mass at the 2 o'clock position showed a grade 1 invasive ductal carcinoma measuring at least 9 mm which was ER strongly positive, NE strongly positive, HER2 negative with a Ki-67 of 8%       ROS:  Overall she feels well.  She has fully recovered from the operation.  She complains of occasional discomfort in both axillae but she denies any depression, easy bruising, fevers, chills, night  sweats, weight loss, nausea, vomiting, diarrhea, constipation, diplopia, blurred vision, headache, palpitations, shortness of breath, abdominal pain, extremity pain, or difficulty ambulating.  The remainder of the ten-point ROS, including general, skin, lymph, H/N, cardiorespiratory, GI, , Neuro, Endocrine, and psychiatric is negative.     Objective:      Physical Exam      She is alert, oriented to  time, place, person, pleasant, well      nourished, in no acute physical distress.                                    VITAL SIGNS:  Reviewed                                      HEENT:  Normal.  There are no nasal, oral, lip, gingival, auricular, lid,    or conjunctival lesions.  Mucosae are moist and pink, and there is no        thrush.  Pupils are equal, reactive to light and accommodation.              Extraocular muscle movements are intact.  Dentition is good.  There is no frontal or maxillary tenderness.                                     NECK:  Supple without JVD, adenopathy, or thyromegaly.                       LUNGS:  Clear to auscultation without wheezing, rales, or rhonchi.           CARDIOVASCULAR:  Reveals an S1, S2, no murmurs, no rubs, no gallops.         ABDOMEN:  Soft, nontender, without organomegaly.  Bowel sounds are    present.                                                                     EXTREMITIES:  No cyanosis, clubbing, or edema.                               BREASTS:   She is status post bilateral mastectomies.  The drainage tubes have been removed.  Her incisions are healing nicely and there is no evidence of inflammation or tumor recurrence                                 LYMPHATIC:  There is no cervical, axillary, or supraclavicular adenopathy.   SKIN:  Warm and moist, without petechiae, rashes, induration, or ecchymoses.           NEUROLOGIC:  DTRs are 0-1+ bilaterally, symmetrical, motor function is 5/5,  and cranial nerves are  within normal limits.    Assessment:       1.  Grade 2 IDCA, left breast, T2 N0 M0 ER strongly positive, WI positive, and HER2 negative, now status post bilateral mastectomy  Plan:         I would a very long discussion with Mrs. Curtis .  I asked her to return in see me in 2 weeks.  Hopefully the Oncotype results will be available so that we can make treatment recommendations.  We also discuss the follow-up and we reviewed the [facility] March 1, 2013 paper  delineating the standard of care for follow-up.  I spent approximately 40 minutes reviewing the available records and evaluating the patient, out of which over 50% of the time was spent face to face with the patient in counseling and coordinating this patient's care.  RTC October 25th  Her multiple questions were answered to her satisfaction.     Route Chart for Scheduling    Med Onc Chart Routing      Follow up with physician Other. See me on October 25th   Follow up with ANDREA    Infusion scheduling note    Injection scheduling note    Labs    Imaging    Pharmacy appointment    Other referrals

## 2022-10-13 ENCOUNTER — DOCUMENTATION ONLY (OUTPATIENT)
Dept: HEMATOLOGY/ONCOLOGY | Facility: CLINIC | Age: 57
End: 2022-10-13
Payer: COMMERCIAL

## 2022-10-13 NOTE — PROGRESS NOTES
Oncotype results received & uploaded into chart then routed to [facility].  Oncology Navigation   Intake  Date of Diagnosis: 08/08/22  Cancer Type: Breast  Internal / External Referral: Internal  Referral Source: Elías  Date of Referral: 08/08/22  Initial Nurse Navigator Contact: 08/23/22  Date Worked: 10/04/22  First Appointment Available: 08/23/22  Appointment Date: 08/23/22  First Available Date vs. Scheduled Date (days): 0  Multiple appointments: Yes (Stiven Christensen)     Treatment  Current Status: Active    Surgery: Planned  Surgical Oncologist: Favio  Type of Surgery: Bilateral mastectomy with SLNB and no recon  Consult Date: 08/23/22  Surgery Schedule Date: 09/21/22    Medical Oncologist: Leroy  Consult Date: 08/23/22       Procedures: Genomic testing  Biopsy Schedule Date: 08/08/23  Genetic Testing Date Sent: 08/23/22  Mammo Schedule Date: 08/17/22  Genomic Testing Date Sent: 10/04/22  Ultrasound Schedule Date: 08/17/22       ER: Positive  MO: Positive  Her2: Negative       Support Systems: Spouse/significant other     Acuity      Follow Up  No follow-ups on file.

## 2022-10-17 LAB
FINAL PATHOLOGIC DIAGNOSIS: NORMAL
FROZEN SECTION DIAGNOSIS: NORMAL
GROSS: NORMAL
Lab: NORMAL
SUPPLEMENTAL DIAGNOSIS: NORMAL

## 2022-10-18 ENCOUNTER — OFFICE VISIT (OUTPATIENT)
Dept: HEMATOLOGY/ONCOLOGY | Facility: CLINIC | Age: 57
End: 2022-10-18
Payer: COMMERCIAL

## 2022-10-18 VITALS
OXYGEN SATURATION: 99 % | HEIGHT: 60 IN | SYSTOLIC BLOOD PRESSURE: 109 MMHG | HEART RATE: 83 BPM | WEIGHT: 143.75 LBS | BODY MASS INDEX: 28.22 KG/M2 | DIASTOLIC BLOOD PRESSURE: 64 MMHG | RESPIRATION RATE: 18 BRPM

## 2022-10-18 DIAGNOSIS — C50.412 MALIGNANT NEOPLASM OF UPPER-OUTER QUADRANT OF LEFT BREAST IN FEMALE, ESTROGEN RECEPTOR POSITIVE: ICD-10-CM

## 2022-10-18 DIAGNOSIS — Z17.0 MALIGNANT NEOPLASM OF UPPER-OUTER QUADRANT OF LEFT BREAST IN FEMALE, ESTROGEN RECEPTOR POSITIVE: ICD-10-CM

## 2022-10-18 DIAGNOSIS — Z79.811 PROPHYLACTIC USE OF ANASTROZOLE (ARIMIDEX): Primary | ICD-10-CM

## 2022-10-18 DIAGNOSIS — C50.612 CARCINOMA OF AXILLARY TAIL OF LEFT BREAST IN FEMALE, ESTROGEN RECEPTOR POSITIVE: ICD-10-CM

## 2022-10-18 DIAGNOSIS — Z17.0 CARCINOMA OF AXILLARY TAIL OF LEFT BREAST IN FEMALE, ESTROGEN RECEPTOR POSITIVE: ICD-10-CM

## 2022-10-18 PROCEDURE — 99214 PR OFFICE/OUTPT VISIT, EST, LEVL IV, 30-39 MIN: ICD-10-PCS | Mod: S$GLB,,, | Performed by: INTERNAL MEDICINE

## 2022-10-18 PROCEDURE — 99214 OFFICE O/P EST MOD 30 MIN: CPT | Mod: S$GLB,,, | Performed by: INTERNAL MEDICINE

## 2022-10-18 PROCEDURE — 3074F SYST BP LT 130 MM HG: CPT | Mod: CPTII,S$GLB,, | Performed by: INTERNAL MEDICINE

## 2022-10-18 PROCEDURE — 99999 PR PBB SHADOW E&M-EST. PATIENT-LVL III: CPT | Mod: PBBFAC,,, | Performed by: INTERNAL MEDICINE

## 2022-10-18 PROCEDURE — 1159F MED LIST DOCD IN RCRD: CPT | Mod: CPTII,S$GLB,, | Performed by: INTERNAL MEDICINE

## 2022-10-18 PROCEDURE — 3074F PR MOST RECENT SYSTOLIC BLOOD PRESSURE < 130 MM HG: ICD-10-PCS | Mod: CPTII,S$GLB,, | Performed by: INTERNAL MEDICINE

## 2022-10-18 PROCEDURE — 3078F DIAST BP <80 MM HG: CPT | Mod: CPTII,S$GLB,, | Performed by: INTERNAL MEDICINE

## 2022-10-18 PROCEDURE — 3078F PR MOST RECENT DIASTOLIC BLOOD PRESSURE < 80 MM HG: ICD-10-PCS | Mod: CPTII,S$GLB,, | Performed by: INTERNAL MEDICINE

## 2022-10-18 PROCEDURE — 1159F PR MEDICATION LIST DOCUMENTED IN MEDICAL RECORD: ICD-10-PCS | Mod: CPTII,S$GLB,, | Performed by: INTERNAL MEDICINE

## 2022-10-18 PROCEDURE — 99999 PR PBB SHADOW E&M-EST. PATIENT-LVL III: ICD-10-PCS | Mod: PBBFAC,,, | Performed by: INTERNAL MEDICINE

## 2022-10-18 RX ORDER — ANASTROZOLE 1 MG/1
1 TABLET ORAL DAILY
Qty: 90 TABLET | Refills: 2 | Status: SHIPPED | OUTPATIENT
Start: 2022-10-18 | End: 2023-07-13

## 2022-10-18 NOTE — PROGRESS NOTES
Subjective:       Patient ID: Malika Curtis is a 57 y.o. female.    Chief Complaint: No chief complaint on file.    HPI     Mrs. Curtis returns today my request for follow-up  I had seen her initially August 23, 2022 and again on September 27th.  She underwent bilateral mastectomies with reconstruction on September 21, 2022.  The pathology report indicates that she had a 30 mm left-sided tumor.  Resection margins were clear while the sentinel lymph node was negative.  There was no malignancy identified in the right mastectomy specimen.  Her Oncotype score is 19 indicating a 94% chance of disease-free survival at 10 years with hormonal therapy alone.  The benefit of chemotherapy is estimated at less than 1%.    Briefly, she is a 57-year-old female with a recent diagnosis of breast cancer referred for evaluation.  Apparently she had an abnormal mammogram on 05/23/2022 that showed a focal asymmetry at the upper outer anterior position of the left breast.  A diagnostic mammogram and ultrasound on 06/22/2022 showed a 6.6 cm area of calcifications.  The ultrasound showed an irregular hypoechoic mass with angular and indistinct margins measuring 1.5 cm.    A biopsy was performed on 08/08/2022.  The area of calcifications showed DCIS, low grade that was ER positive and NJ positive.  The mass at the 2 o'clock position showed a grade 1 invasive ductal carcinoma measuring at least 9 mm which was ER strongly positive, NJ strongly positive, HER2 negative with a Ki-67 of 8%       ROS:  Overall she feels well.  Her ECOG PS is 0.   She has fully recovered from the operation.  She complains of occasional discomfort in both axillae but she denies any depression, easy bruising, fevers, chills, night  sweats, weight loss, nausea, vomiting, diarrhea, constipation, diplopia, blurred vision, headache, palpitations, shortness of breath, abdominal pain, extremity pain, or difficulty ambulating.  The remainder of the ten-point ROS,  including general, skin, lymph, H/N, cardiorespiratory, GI, , Neuro, Endocrine, and psychiatric is negative.     Objective:      Physical Exam      She is alert, oriented to time, place, person, pleasant, well      nourished, in no acute physical distress.   She is here with her .                                 VITAL SIGNS:  Reviewed                                      HEENT:  Normal.  There are no nasal, oral, lip, gingival, auricular, lid,    or conjunctival lesions.  Mucosae are moist and pink, and there is no        thrush.  Pupils are equal, reactive to light and accommodation.              Extraocular muscle movements are intact.  Dentition is good.  There is no frontal or maxillary tenderness.                                     NECK:  Supple without JVD, adenopathy, or thyromegaly.                       LUNGS:  Clear to auscultation without wheezing, rales, or rhonchi.           CARDIOVASCULAR:  Reveals an S1, S2, no murmurs, no rubs, no gallops.         ABDOMEN:  Soft, nontender, without organomegaly.  Bowel sounds are    present.                                                                     EXTREMITIES:  No cyanosis, clubbing, or edema.                               BREASTS:   She is status post bilateral mastectomies.  Her incisions are healing nicely and there is no evidence of inflammation or tumor recurrence                                 LYMPHATIC:  There is no cervical, axillary, or supraclavicular adenopathy.   SKIN:  Warm and moist, without petechiae, rashes, induration, or ecchymoses.           NEUROLOGIC:  DTRs are 0-1+ bilaterally, symmetrical, motor function is 5/5,  and cranial nerves are  within normal limits.    Assessment:       1.  Grade 2 IDCA, left breast, T2 N0 M0 ER strongly positive, DC positive, and HER2 negative, now status post bilateral mastectomies  Plan:         I would a very long discussion with Mrs. Curtis and her .  We went over the Oncotype results  and their significance.  We also discussed in detail the pros and cons of adjuvant treatment with anastrozole.  She was given a prescription for anastrozole 1 mg tablets with instructions to start taking it and I will see her in early December with a bone density scan.  Her multiple questions were answered to her satisfaction.        Route Chart for Scheduling    Med Onc Chart Routing      Follow up with physician 6 weeks. See me in early December with a DEXA scan   Follow up with ANDREA    Infusion scheduling note    Injection scheduling note    Labs    Imaging DXA scan      Pharmacy appointment    Other referrals

## 2022-11-03 LAB — INTEGRATED BRAC ANALYSIS: NORMAL

## 2022-12-06 ENCOUNTER — OFFICE VISIT (OUTPATIENT)
Dept: HEMATOLOGY/ONCOLOGY | Facility: CLINIC | Age: 57
End: 2022-12-06
Payer: COMMERCIAL

## 2022-12-06 VITALS
WEIGHT: 147.25 LBS | SYSTOLIC BLOOD PRESSURE: 126 MMHG | OXYGEN SATURATION: 99 % | HEART RATE: 81 BPM | DIASTOLIC BLOOD PRESSURE: 78 MMHG | TEMPERATURE: 98 F | RESPIRATION RATE: 18 BRPM | HEIGHT: 60 IN | BODY MASS INDEX: 28.91 KG/M2

## 2022-12-06 DIAGNOSIS — Z17.0 CARCINOMA OF AXILLARY TAIL OF LEFT BREAST IN FEMALE, ESTROGEN RECEPTOR POSITIVE: Primary | ICD-10-CM

## 2022-12-06 DIAGNOSIS — D05.12 DUCTAL CARCINOMA IN SITU (DCIS) OF LEFT BREAST: ICD-10-CM

## 2022-12-06 DIAGNOSIS — C50.612 CARCINOMA OF AXILLARY TAIL OF LEFT BREAST IN FEMALE, ESTROGEN RECEPTOR POSITIVE: Primary | ICD-10-CM

## 2022-12-06 PROCEDURE — 3078F DIAST BP <80 MM HG: CPT | Mod: CPTII,S$GLB,, | Performed by: INTERNAL MEDICINE

## 2022-12-06 PROCEDURE — 99999 PR PBB SHADOW E&M-EST. PATIENT-LVL IV: ICD-10-PCS | Mod: PBBFAC,,, | Performed by: INTERNAL MEDICINE

## 2022-12-06 PROCEDURE — 3074F SYST BP LT 130 MM HG: CPT | Mod: CPTII,S$GLB,, | Performed by: INTERNAL MEDICINE

## 2022-12-06 PROCEDURE — 3074F PR MOST RECENT SYSTOLIC BLOOD PRESSURE < 130 MM HG: ICD-10-PCS | Mod: CPTII,S$GLB,, | Performed by: INTERNAL MEDICINE

## 2022-12-06 PROCEDURE — 99213 OFFICE O/P EST LOW 20 MIN: CPT | Mod: S$GLB,,, | Performed by: INTERNAL MEDICINE

## 2022-12-06 PROCEDURE — 99999 PR PBB SHADOW E&M-EST. PATIENT-LVL IV: CPT | Mod: PBBFAC,,, | Performed by: INTERNAL MEDICINE

## 2022-12-06 PROCEDURE — 1159F PR MEDICATION LIST DOCUMENTED IN MEDICAL RECORD: ICD-10-PCS | Mod: CPTII,S$GLB,, | Performed by: INTERNAL MEDICINE

## 2022-12-06 PROCEDURE — 3008F PR BODY MASS INDEX (BMI) DOCUMENTED: ICD-10-PCS | Mod: CPTII,S$GLB,, | Performed by: INTERNAL MEDICINE

## 2022-12-06 PROCEDURE — 1159F MED LIST DOCD IN RCRD: CPT | Mod: CPTII,S$GLB,, | Performed by: INTERNAL MEDICINE

## 2022-12-06 PROCEDURE — 99213 PR OFFICE/OUTPT VISIT, EST, LEVL III, 20-29 MIN: ICD-10-PCS | Mod: S$GLB,,, | Performed by: INTERNAL MEDICINE

## 2022-12-06 PROCEDURE — 3078F PR MOST RECENT DIASTOLIC BLOOD PRESSURE < 80 MM HG: ICD-10-PCS | Mod: CPTII,S$GLB,, | Performed by: INTERNAL MEDICINE

## 2022-12-06 PROCEDURE — 3008F BODY MASS INDEX DOCD: CPT | Mod: CPTII,S$GLB,, | Performed by: INTERNAL MEDICINE
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
## 2022-12-21 ENCOUNTER — HOSPITAL ENCOUNTER (OUTPATIENT)
Dept: RADIOLOGY | Facility: CLINIC | Age: 57
Discharge: HOME | End: 2022-12-21
Attending: INTERNAL MEDICINE
Payer: COMMERCIAL

## 2022-12-21 DIAGNOSIS — Z17.0 CARCINOMA OF AXILLARY TAIL OF LEFT BREAST IN FEMALE, ESTROGEN RECEPTOR POSITIVE: ICD-10-CM

## 2022-12-21 DIAGNOSIS — C50.612 CARCINOMA OF AXILLARY TAIL OF LEFT BREAST IN FEMALE, ESTROGEN RECEPTOR POSITIVE: ICD-10-CM

## 2022-12-21 PROCEDURE — 77080 DXA BONE DENSITY AXIAL: CPT | Mod: 26,,, | Performed by: INTERNAL MEDICINE

## 2022-12-21 PROCEDURE — 77080 DXA BONE DENSITY AXIAL: CPT | Mod: TC,PO

## 2022-12-21 PROCEDURE — 77080 DEXA BONE DENSITY SPINE HIP: ICD-10-PCS | Mod: 26,,, | Performed by: INTERNAL MEDICINE

## 2023-03-07 ENCOUNTER — OFFICE VISIT (OUTPATIENT)
Dept: HEMATOLOGY/ONCOLOGY | Facility: CLINIC | Age: 58
End: 2023-03-07
Payer: COMMERCIAL

## 2023-03-07 VITALS
OXYGEN SATURATION: 98 % | HEIGHT: 60 IN | DIASTOLIC BLOOD PRESSURE: 86 MMHG | TEMPERATURE: 98 F | SYSTOLIC BLOOD PRESSURE: 138 MMHG | BODY MASS INDEX: 28.53 KG/M2 | WEIGHT: 145.31 LBS | HEART RATE: 78 BPM | RESPIRATION RATE: 18 BRPM

## 2023-03-07 DIAGNOSIS — C50.612 CARCINOMA OF AXILLARY TAIL OF LEFT BREAST IN FEMALE, ESTROGEN RECEPTOR POSITIVE: Primary | ICD-10-CM

## 2023-03-07 DIAGNOSIS — D05.12 DUCTAL CARCINOMA IN SITU (DCIS) OF LEFT BREAST: ICD-10-CM

## 2023-03-07 DIAGNOSIS — Z17.0 CARCINOMA OF AXILLARY TAIL OF LEFT BREAST IN FEMALE, ESTROGEN RECEPTOR POSITIVE: Primary | ICD-10-CM

## 2023-03-07 PROCEDURE — 3075F PR MOST RECENT SYSTOLIC BLOOD PRESS GE 130-139MM HG: ICD-10-PCS | Mod: CPTII,S$GLB,, | Performed by: INTERNAL MEDICINE

## 2023-03-07 PROCEDURE — 3079F PR MOST RECENT DIASTOLIC BLOOD PRESSURE 80-89 MM HG: ICD-10-PCS | Mod: CPTII,S$GLB,, | Performed by: INTERNAL MEDICINE

## 2023-03-07 PROCEDURE — 1159F MED LIST DOCD IN RCRD: CPT | Mod: CPTII,S$GLB,, | Performed by: INTERNAL MEDICINE

## 2023-03-07 PROCEDURE — 3008F BODY MASS INDEX DOCD: CPT | Mod: CPTII,S$GLB,, | Performed by: INTERNAL MEDICINE

## 2023-03-07 PROCEDURE — 1159F PR MEDICATION LIST DOCUMENTED IN MEDICAL RECORD: ICD-10-PCS | Mod: CPTII,S$GLB,, | Performed by: INTERNAL MEDICINE

## 2023-03-07 PROCEDURE — 3079F DIAST BP 80-89 MM HG: CPT | Mod: CPTII,S$GLB,, | Performed by: INTERNAL MEDICINE

## 2023-03-07 PROCEDURE — 99999 PR PBB SHADOW E&M-EST. PATIENT-LVL III: CPT | Mod: PBBFAC,,, | Performed by: INTERNAL MEDICINE

## 2023-03-07 PROCEDURE — 99213 OFFICE O/P EST LOW 20 MIN: CPT | Mod: S$GLB,,, | Performed by: INTERNAL MEDICINE

## 2023-03-07 PROCEDURE — 3075F SYST BP GE 130 - 139MM HG: CPT | Mod: CPTII,S$GLB,, | Performed by: INTERNAL MEDICINE

## 2023-03-07 PROCEDURE — 1160F PR REVIEW ALL MEDS BY PRESCRIBER/CLIN PHARMACIST DOCUMENTED: ICD-10-PCS | Mod: CPTII,S$GLB,, | Performed by: INTERNAL MEDICINE

## 2023-03-07 PROCEDURE — 1160F RVW MEDS BY RX/DR IN RCRD: CPT | Mod: CPTII,S$GLB,, | Performed by: INTERNAL MEDICINE

## 2023-03-07 PROCEDURE — 3008F PR BODY MASS INDEX (BMI) DOCUMENTED: ICD-10-PCS | Mod: CPTII,S$GLB,, | Performed by: INTERNAL MEDICINE

## 2023-03-07 PROCEDURE — 99999 PR PBB SHADOW E&M-EST. PATIENT-LVL III: ICD-10-PCS | Mod: PBBFAC,,, | Performed by: INTERNAL MEDICINE

## 2023-03-07 PROCEDURE — 99213 PR OFFICE/OUTPT VISIT, EST, LEVL III, 20-29 MIN: ICD-10-PCS | Mod: S$GLB,,, | Performed by: INTERNAL MEDICINE

## 2023-03-07 NOTE — PROGRESS NOTES
Subjective:       Patient ID: Malika Curtis is a 58 y.o. female.    Chief Complaint: No chief complaint on file.      HPI     Mrs. Curtis returns today for follow-up  I had last seen her in early December 2022.  As of November 1, 2022 she is on Arimidex in the adjuvant setting, and she has tolerated it well so far without any side effects.  Her DEXA scan shows normal bone density.    Of note, she had undergone bilateral mastectomies with reconstruction on September 21, 2022.  The pathology report indicates that she had a 30 mm left-sided IDCA.  Resection margins were clear while the sentinel lymph node was negative.  There was no invasive or in situ cancer identified in the right mastectomy specimen.  Her Oncotype score is 19 indicating a 94% chance of disease-free survival at 10 years with hormonal therapy alone.  The benefit of chemotherapy was estimated at less than 1%.    Briefly, she is a 58-year-old female with a recent diagnosis of breast cancer referred for evaluation.  Apparently she had an abnormal mammogram on 05/23/2022 that showed a focal asymmetry at the upper outer anterior position of the left breast.  A diagnostic mammogram and ultrasound on 06/22/2022 showed a 6.6 cm area of calcifications.  The ultrasound showed an irregular hypoechoic mass with angular and indistinct margins measuring 1.5 cm.    A biopsy was performed on 08/08/2022.  The area of calcifications showed DCIS, low grade that was ER positive and MI positive.  The mass at the 2 o'clock position showed a grade 1 invasive ductal carcinoma measuring at least 9 mm which was ER strongly positive, MI strongly positive, HER2 negative with a Ki-67 of 8%       ROS:  Overall she feels well and she has no complaints.  Her ECOG PS is 0.  She has tolerated the anastrozole well so far.  She denies any depression, easy bruising, fevers, chills, night  sweats, weight loss, nausea, vomiting, diarrhea, constipation, diplopia, blurred vision, headache,  palpitations, shortness of breath, abdominal pain, extremity pain, or difficulty ambulating.  The remainder of the ten-point ROS, including general, skin, lymph, H/N, cardiorespiratory, GI, , Neuro, Endocrine, and psychiatric is negative.     Objective:      Physical Exam      She is alert, oriented to time, place, person, pleasant, well      nourished, in no acute physical distress.                                   VITAL SIGNS:  Reviewed                                      HEENT:  Normal.  There are no nasal, oral, lip, gingival, auricular, lid,    or conjunctival lesions.  Mucosae are moist and pink, and there is no        thrush.  Pupils are equal, reactive to light and accommodation.              Extraocular muscle movements are intact.  Dentition is good.  There is no frontal or maxillary tenderness.                                     NECK:  Supple without JVD, adenopathy, or thyromegaly.                       LUNGS:  Clear to auscultation without wheezing, rales, or rhonchi.           CARDIOVASCULAR:  Reveals an S1, S2, no murmurs, no rubs, no gallops.         ABDOMEN:  Soft, nontender, without organomegaly.  Bowel sounds are present.                                                                     EXTREMITIES:  No cyanosis, clubbing, or edema.                               BREASTS:   She is status post bilateral mastectomies.  Her incisions have healed nicely and there is no evidence of recurrence                                 LYMPHATIC:  There is no cervical, axillary, or supraclavicular adenopathy.   SKIN:  Warm and moist, without petechiae, rashes, induration, or ecchymoses.           NEUROLOGIC:  DTRs are 0-1+ bilaterally, symmetrical, motor function is 5/5,  and cranial nerves are  within normal limits.    Assessment:       1.  Grade 2 IDCA, left breast, T2 N0 M0 ER strongly positive, SC positive, and HER2 negative, now status post bilateral mastectomies       2. Adjuvant use of  anastrozole  Plan:         I would a long discussion with Mrs. Curtis.  She will remain on anastrozole which she will take through the end of October 2027.  I will see her again in 3 months.    I advised her to take calcium 1,000 mg and vitamin-D 800 units daily for osteoporosis prevention  Her multiple questions were answered to her satisfaction.        Route Chart for Scheduling    Med Onc Chart Routing      Follow up with physician 3 months.   Follow up with ANDREA    Infusion scheduling note    Injection scheduling note    Labs    Imaging    Pharmacy appointment    Other referrals

## 2023-04-11 ENCOUNTER — TELEPHONE (OUTPATIENT)
Dept: SURGERY | Facility: CLINIC | Age: 58
End: 2023-04-11
Payer: COMMERCIAL

## 2023-04-19 ENCOUNTER — PATIENT MESSAGE (OUTPATIENT)
Dept: INTERNAL MEDICINE | Facility: CLINIC | Age: 58
End: 2023-04-19
Payer: COMMERCIAL

## 2023-05-04 ENCOUNTER — OFFICE VISIT (OUTPATIENT)
Dept: SURGERY | Facility: CLINIC | Age: 58
End: 2023-05-04
Payer: COMMERCIAL

## 2023-05-04 VITALS
BODY MASS INDEX: 28.47 KG/M2 | WEIGHT: 145 LBS | OXYGEN SATURATION: 98 % | HEART RATE: 98 BPM | TEMPERATURE: 98 F | SYSTOLIC BLOOD PRESSURE: 112 MMHG | HEIGHT: 60 IN | DIASTOLIC BLOOD PRESSURE: 70 MMHG

## 2023-05-04 DIAGNOSIS — Z90.13 STATUS POST BILATERAL MASTECTOMY: Primary | ICD-10-CM

## 2023-05-04 DIAGNOSIS — Z17.0 MALIGNANT NEOPLASM OF UPPER-OUTER QUADRANT OF LEFT BREAST IN FEMALE, ESTROGEN RECEPTOR POSITIVE: ICD-10-CM

## 2023-05-04 DIAGNOSIS — C50.412 MALIGNANT NEOPLASM OF UPPER-OUTER QUADRANT OF LEFT BREAST IN FEMALE, ESTROGEN RECEPTOR POSITIVE: ICD-10-CM

## 2023-05-04 PROCEDURE — 1159F PR MEDICATION LIST DOCUMENTED IN MEDICAL RECORD: ICD-10-PCS | Mod: CPTII,S$GLB,, | Performed by: SURGERY

## 2023-05-04 PROCEDURE — 99999 PR PBB SHADOW E&M-EST. PATIENT-LVL IV: ICD-10-PCS | Mod: PBBFAC,,, | Performed by: SURGERY

## 2023-05-04 PROCEDURE — 3078F DIAST BP <80 MM HG: CPT | Mod: CPTII,S$GLB,, | Performed by: SURGERY

## 2023-05-04 PROCEDURE — 3074F PR MOST RECENT SYSTOLIC BLOOD PRESSURE < 130 MM HG: ICD-10-PCS | Mod: CPTII,S$GLB,, | Performed by: SURGERY

## 2023-05-04 PROCEDURE — 3008F PR BODY MASS INDEX (BMI) DOCUMENTED: ICD-10-PCS | Mod: CPTII,S$GLB,, | Performed by: SURGERY

## 2023-05-04 PROCEDURE — 1159F MED LIST DOCD IN RCRD: CPT | Mod: CPTII,S$GLB,, | Performed by: SURGERY

## 2023-05-04 PROCEDURE — 3008F BODY MASS INDEX DOCD: CPT | Mod: CPTII,S$GLB,, | Performed by: SURGERY

## 2023-05-04 PROCEDURE — 3074F SYST BP LT 130 MM HG: CPT | Mod: CPTII,S$GLB,, | Performed by: SURGERY

## 2023-05-04 PROCEDURE — 3078F PR MOST RECENT DIASTOLIC BLOOD PRESSURE < 80 MM HG: ICD-10-PCS | Mod: CPTII,S$GLB,, | Performed by: SURGERY

## 2023-05-04 PROCEDURE — 99213 PR OFFICE/OUTPT VISIT, EST, LEVL III, 20-29 MIN: ICD-10-PCS | Mod: S$GLB,,, | Performed by: SURGERY

## 2023-05-04 PROCEDURE — 99999 PR PBB SHADOW E&M-EST. PATIENT-LVL IV: CPT | Mod: PBBFAC,,, | Performed by: SURGERY

## 2023-05-04 PROCEDURE — 99213 OFFICE O/P EST LOW 20 MIN: CPT | Mod: S$GLB,,, | Performed by: SURGERY

## 2023-05-04 NOTE — PROGRESS NOTES
[facility]         Breast Surgery Surveillance    5/4/2023    DIAGNOSIS:   This is a 58 y.o. female with a history stage pT2 N0 M0 grade 2 ER + [place] + HER2 - invasive ductal carcinoma of the left breast.    TREATMENT:   1. bilateral mastectomy with sentinel node biopsy on 9/21/2022. Roxana Stahl M.D. Surgical Oncology  2. 30 mm Grade 2 IDC. Negative margins. Negative nodes. No atypia or malignancy of the right mastectomy.   3. Adjuvant endocrine therapy (Arimidex) started on 11/1/2022. Rohit Mcnair M.D. Medical Oncology     HISTORY OF PRESENT ILLNESS:   Malika Curtis is a 58 y.o. female comes in for oncological follow up.  She denies change in her breast self-exam specifically denying new masses, skin or nipple changes, or nipple discharge. Past medical and surgical history is updated without new changes. There have been no changes to family history. The patient denies constitutional symptoms of night sweats, weight loss, new headaches, visual changes, new back or bony pain, chest pain, or shortness of breath.      IMAGING:   None     MEDICATIONS/ALLERGIES:     Review of patient's allergies indicates:   Allergen Reactions    Sulfa (sulfonamide antibiotics) Hives       PHYSICAL EXAM:   /70 (BP Location: Right arm, Patient Position: Sitting, BP Method: Medium (Automatic))   Pulse 98   Temp 97.8 °F (36.6 °C) (Oral)   Ht 5' (1.524 m)   Wt 65.8 kg (145 lb)   SpO2 98%   BMI 28.32 kg/m²   Physical Exam   Vitals reviewed.  Constitutional: She is oriented to person, place, and time.   HENT:   Head: Normocephalic and atraumatic.   Nose: Nose normal.   Eyes: Pupils are equal, round, and reactive to light. Right eye exhibits no discharge. Left eye exhibits no discharge.   Pulmonary/Chest: Effort normal and breath sounds normal. No stridor. No respiratory distress. She exhibits no mass, no tenderness and no edema. Right breast exhibits no mass, no skin change and no tenderness. Left breast exhibits  no mass, no skin change and no tenderness. No breast swelling or bleeding. Breasts are symmetrical.       Abdominal: Normal appearance.   Genitourinary: No breast swelling or bleeding.   Neurological: She is alert and oriented to person, place, and time.   Skin: Skin is warm and dry.     Psychiatric: Her behavior is normal. Mood, judgment and thought content normal.    ASSESSMENT:   This is a 58 y.o. female without evidence of recurrence by exam, history or imaging.       PLAN:   1. Continue to follow up with Dr. Harper.  2. Continue monthly self breast exams and call the clinic with any changes or problems.  3. No further screening mammogram due to bilateral mastectomies.   4. Return to clinic in 1 year with ANDREA.     The patient is in agreement with the plan. Questions were encouraged and answered to patient's satisfaction. Malika will call our office with any questions or concerns.       25 minutes were spent on this encounter, 15 of which was face to face counseling and 10 minutes were spent on chart review and coordination of care.    [FreeTextEntry1] : Ms. SAM PATEL is a very pleasant 27 year female who seen for evaluation of R knee pain  that has been ongoing for 6 months  without any specific injury or inciting event. The pain is located primarily ant knee  intermittent in nature and described as dull . The pain is rated as 0/10 during today's visit, and ranges from 0-6/10. The patient's symptoms are aggravated by sitting/ bending/ driving   and alleviated by change in position  . The patient denies any night pain, numbness/tingling, weakness, or bowel/bladder dysfunction.  She has no giving way or swelling The patient has no other complaints at this time.\par she is a  unemployed right now \par

## 2023-05-26 ENCOUNTER — CLINICAL SUPPORT (OUTPATIENT)
Dept: REHABILITATION | Facility: HOSPITAL | Age: 58
End: 2023-05-26
Payer: COMMERCIAL

## 2023-05-26 DIAGNOSIS — D05.12 DUCTAL CARCINOMA IN SITU (DCIS) OF LEFT BREAST: ICD-10-CM

## 2023-05-26 DIAGNOSIS — R29.898 WEAKNESS OF BOTH UPPER EXTREMITIES: ICD-10-CM

## 2023-05-26 DIAGNOSIS — M25.611 DECREASED RANGE OF MOTION OF RIGHT SHOULDER: ICD-10-CM

## 2023-05-26 DIAGNOSIS — Z91.89 AT RISK FOR LYMPHEDEMA: ICD-10-CM

## 2023-05-26 DIAGNOSIS — M25.612 DECREASED RANGE OF MOTION OF SHOULDER, LEFT: ICD-10-CM

## 2023-05-26 PROCEDURE — 97162 PT EVAL MOD COMPLEX 30 MIN: CPT

## 2023-05-26 PROCEDURE — 97110 THERAPEUTIC EXERCISES: CPT

## 2023-05-26 PROCEDURE — 97140 MANUAL THERAPY 1/> REGIONS: CPT

## 2023-05-26 NOTE — PLAN OF CARE
[facility]  Physical Therapy Initial Evaluation    Date: 5/26/2023   Name: Malika Curtis  Clinic Number: [id number]    Therapy Diagnosis:   Encounter Diagnoses   Name Primary?    Ductal carcinoma in situ (DCIS) of left breast     Weakness of both upper extremities     Decreased range of motion of shoulder, left     Decreased range of motion of right shoulder     At risk for lymphedema      Physician: Roxana Stahl MD    Physician Orders: PT Eval and Treat   Medical Diagnosis: Z90.13 (ICD-10-CM) - Status post bilateral mastectomy  Evaluation Date: 5/26/2023  Authorization Period Expiration: 12/29/2023  Plan of Care Certification Period: 7/21/2023  Visit # / Visits authorized: 1/ 1  Insurance: BLUE CROSS BLUE SHIELD  FOTO: 1/3    Time In: 9:00 AM  Time Out: 10:00 AM  Total Billable Time: 60 minutes    Precautions: Standard and cancer      History   History of Present Illness: Malika is a 58 y.o. female that presents to  [facility] at the [facility] s/p B mastectomy with SLNB left.   Diagnosis:  grade 2 ER + [place] + HER2 - invasive ductal carcinoma of the left breast  Chief complaint: trouble reaching up and into a cabinet, shaking a sheet, washing/scratching back, vacuuming gets tired, reaching to clean the back for sugar gliders pan,   Surgical History:  Malika Curtis  has a past surgical history that includes Endoscopy; Mastectomy (Bilateral, 9/21/2022); [place] lymph node biopsy (Left, 9/21/2022); and Injection for sentinel node identification (Left, 9/21/2022).    Chemotherapy: N/A  Radiation: N/A  Endocrine therapy:  Arimidex     Past Medical History:   Diagnosis Date    Acid reflux     Anxiety     Bronchitis     Hip bursitis, left     Migraine           Medications:  Malika has a current medication list which includes the following prescription(s): albuterol, anastrozole, bupropion, butalbital-acetaminophen-caffeine -40 mg,  cetirizine, diazepam, meloxicam, multivitamin, and UNABLE TO FIND.    Allergies:  Review of patient's allergies indicates:   Allergen Reactions    Sulfa (sulfonamide antibiotics) Hives        Prior Therapy: PT previously for hip  Social History: single story home, one step to enter, lives with their family  Durable Medical Equipment: none  Occupation: , works from home  Prior Level of Function: independent  Current Level of Function: trouble with reaching up and out, reaching down, and reaching behind her back  Exercise routine prior to onset: walking, hand weights(5#, 8#, medicine ball), resistance band exercises, yoga  Hand dominance: right      Patient's Goals: to get as close to pre-surgery as is possible in the next couple of months        Subjective   Pt states: no pain, just irritation in her chest but does have some pain in axilla B  Pain: 2/10 on VAS currently.   Best: 0/10  Worst: 6/10   Pain location: bilateral axilla   Objective   Mental status: alert & oriented x 3    Postural examination/scapula alignment: Rounded shoulder and Head forward    Skin Integrity:   Scar Location: B breast  Appearance: healed  Signs of infection: No  Drainage: N/A  Color:N/A    Edema: none  Location: N/A    Axillary Web Syndrome/Cording:   Location: N/A  Degree of Cording: N/A (mild, moderate etc...)   Number of cords present: 0    Sensation: WNL         Range of Motion:      Shoulder Range of Motion:   Active /Passive ROM Right Left   Flexion 180 172   Abduction 180 180   Extension 55 60   IR/90deg 80 80   ER/90deg 85 80          Strength: manual muscle test grades below   Upper Extremity Strength   (R) UE (L) UE   Shoulder flexion: 3+/5 3+/5   Shoulder Abduction: 5/5 5/5   Shoulder IR 5/5 5/5   Shoulder ER 5/5 5/5   Elbow flexion: 5/5 5/5   Elbow extension: 3+/5 3+/5   Wrist flexion: 5/5 5/5   Wrist extension: 5/5 5/5    55.1 lbs 44.9 lbs       Baseline Measurements of BL UE's for early detection of  "Lymphedema:     LANDMARK RIGHT UE LEFT UE DIFFERENCE   E + 8" 38 cm 37 cm 1.0 cm   E + 6" 35.5 cm 34 cm 1.5 cm   E + 4" 33 cm 31.5 cm 1.5 cm   E + 2" 27 cm 26.5 cm 0.5 cm   Elbow 25 cm 25 cm 0 cm   W+ 8" 26 cm 24.5 cm 1.5 cm   W +  6" 24.5 cm 23 cm 1.5 cm   W + 4" 20.5 cm 19 cm 0.5 cm   Wrist 16 cm 16 cm 0 cm   DPC 19.5 cm 20 cm 0.5 cm   IP Thumb 6.5 cm 7.5 cm 1.0 cm     Functional Mobility (Bed mobility, transfers)  independent    ADL's:  independent    Gait Assessment:   - AD used: none  - Assistance: independent  - Distance: community distances     Patient Education Provided   - role of therapy in multi - disciplinary team, goals for therapy  - Pt was educated in lymphedema etiology and management plans.    - Pt was provided with written risk reductions and precautions for managing lymphedema.     Pt has no cultural, educational or language barriers to learning provided.  Treatment and Instruction of Home Exercise Program      Total Time Separate from Evaluation: 20 minutes    Malika received individual therapeutic exercises to improve postural correction and alignment, stretching and soft tissue mobility, and strengthening for 10 minutes including the following:   Scapula retractions  B shoulder ER, red  LTR with hands behind head  Supine pec stretch with towel roll    Malika received the following manual therapy techniques were performed to increased myofascial/soft tissue length, mobility and pliability, increase PROM, AROM and function as well as to decrease pain for 10 minutes  - pec side bending in ER  - Gentle STM of B pecs and lats        Written Home Exercises Provided: yes.  Exercises were reviewed and Malika was able to demonstrate them prior to the end of the session.  Malika demonstrated good  understanding of the education provided.     See EMR under Patient Instructions for exercises provided 5/26/2023.      Functional Limitations Reporting      CMS Impairment/Limitation/Restriction for " FOTO Shoulder Survey    Therapist reviewed FOTO scores for Malika Curtis on 5/26/2023.   FOTO documents entered into [employer] - see Media section.    Limitations Score: 41%  Category: Carrying           Assessment   This is a 58 y.o. female referred to outpatient physical therapy and presents with a medical diagnosis of L breast cancer and was seen today post-operatively to establish PT plan of care for impairments following B mastectomy and L SLNB including: decreased ROM of both shoulders, weakness of both upper extremities, poor posture, and at risk for lymphedema. Due to her impairments she has difficulty with reaching up, behind her back, and down for objects on the floor.     Pt instructed in HEP this session and was able to perform all exercises given independently. Pt instructed to follow up with therapist if any concerns arise with program established. Pt will continue to benefit from skilled physical therapy to address the impairments stated in chart below, provide patient/family education and to maximize pt's level of independence in home and community environment     Pt prognosis is Good.    Anticipated barriers to physical therapy: none anticipated     Pt's spiritual, cultural and educational needs considered and pt agreeable to plan of care and goals as stated below:     Medical necessity is demonstrated by the following IMPAIRMENTS/PROMBLEM LIST:    History  Co-morbidities and personal factors that may impact the plan of care Co-morbidities:   anxiety, difficulty sleeping, and history of cancer    Personal Factors:   no deficits     moderate   Examination  Body Structures and Functions, activity limitations and participation restrictions that may impact the plan of care Body Regions:   upper extremities  trunk    Body Systems:    ROM  strength  motor control  motor learning    Participation Restrictions:   None anticipated    Activity limitations:   Learning and applying knowledge  no  deficits    General Tasks and Commands  no deficits    Communication  no deficits    Mobility  lifting and carrying objects    Self care  washing oneself (bathing, drying, washing hands)  dressing    Domestic Life  shopping  doing house work (cleaning house, washing dishes, laundry)    Interactions/Relationships  no deficits    Life Areas  no deficits    Community and Social Life  community life  recreation and leisure         moderate   Clinical Presentation evolving clinical presentation with changing clinical characteristics moderate   Decision Making/ Complexity Score: moderate       Goals: Pt agrees with goals set    Short Term goals: 4 weeks  1. Patient will demonstrate 100% understanding of lymphedema risk reduction practices to include self monitoring for lymphedema. (progressing, not met)  2. Patient will demonstrate independence with Home Exercise program established. (progressing, not met)  3. Pt will increase AROM/PROM in shoulder external rotation ROM to 90 degrees bilaterally to improve functional reach, carry, push, pull pain free. (progressing, not met)  4. Pt will increase AROM/PROM in shoulder flexion to 180 degrees on the left to improve functional reach, carry, push, pull pain free.(progressing, not met)  5. Pt will increase strength to >/= 4+/5 in gross UE musculature to improve tolerance to all functional activities pain free. (progressing, not met)    Long Term Goals: 8 weeks   1.  Pt will increase AROM/PROM in shoulder internal rotation to 90 degrees bilaterally to improve functional reach, carry, push, pull pain free. (progressing, not met)  2. Pt will increase strength to 4+/5 in gross UE musculature to improve tolerance to all functional activities pain free. (progressing, not met)  3. Pt will demonstrate full/maximized tissue mobility to increase ROM and promote healthy tissue to be pain free at discharge. (progressing, not met)  4. Pt will report decrease in overall worst pain to 2/10 at  discharge. (progressing, not met)  5. Pt will increase strength to 5/5 in gross UE musculature to improve tolerance to all functional activities pain free. (progressing, not met)  6. Patient will report compliance with walking program 5x week for 30 min each day to improve overall cardiovascular function and decrease cancer related fatigue at discharge. (progressing, not met)      Plan   Outpatient physical therapy 2x week for 8 weeks to include the following:   Manual Therapy, Neuromuscular Re-ed, Patient Education, Self Care, Therapeutic Activities, Therapeutic Exercise, and IASTM . Dry needling with manual therapy techniques to decrease pain, inflammation and swelling, increase circulation and promote healing process.    Plan of care Certification Period: 5/26/2023 to 07/21/2023.    Therapist: Catherine Glover, PT  5/26/2023

## 2023-05-26 NOTE — PATIENT INSTRUCTIONS
Pec Door Stretch     doorframe with palms, forearms, and elbows against frame. Lean forward until a stretch is felt in the chest. Hold 30 seconds.  Repeat 3-5 times per session. Do 1-2 sessions per day.        Gentle Lower trunk rotation with butterfly stretch - only bring legs 1/2 the distance of the picture   3 x 10 reps each side         Foam roller pec stretch    Stretch arms out to side. Hold 2 minutes. Relax and repeat.           Theraband shoulder external rotation    Hold the band out to the front with both hands, elbows at your sides and bent 90 degrees.     Stretch the band apart as far as you can without pain. Keep the elbows in contact with your ribs. Squeeze the shoulder blades together.   Then return to start position.  Do 10 reps per set. Do 3 sets per session. Do 2 sessions per day

## 2023-05-30 ENCOUNTER — CLINICAL SUPPORT (OUTPATIENT)
Dept: REHABILITATION | Facility: HOSPITAL | Age: 58
End: 2023-05-30
Payer: COMMERCIAL

## 2023-05-30 DIAGNOSIS — M25.612 DECREASED RANGE OF MOTION OF SHOULDER, LEFT: ICD-10-CM

## 2023-05-30 DIAGNOSIS — M25.611 DECREASED RANGE OF MOTION OF RIGHT SHOULDER: ICD-10-CM

## 2023-05-30 DIAGNOSIS — R29.898 WEAKNESS OF BOTH UPPER EXTREMITIES: Primary | ICD-10-CM

## 2023-05-30 DIAGNOSIS — Z91.89 AT RISK FOR LYMPHEDEMA: ICD-10-CM

## 2023-05-30 PROCEDURE — 97140 MANUAL THERAPY 1/> REGIONS: CPT

## 2023-05-30 PROCEDURE — 97110 THERAPEUTIC EXERCISES: CPT

## 2023-05-30 NOTE — PROGRESS NOTES
Physical Therapy Daily Treatment Note       Date: 5/30/2023   Name: Malika Curtis  Clinic Number: [id number]    Therapy Diagnosis:   Encounter Diagnoses   Name Primary?    Weakness of both upper extremities Yes    Decreased range of motion of shoulder, left     Decreased range of motion of right shoulder     At risk for lymphedema      Physician: Roxana Stahl MD    Physician Orders: PT Eval and Treat   Medical Diagnosis: Z90.13 (ICD-10-CM) - Status post bilateral mastectomy  Evaluation Date: 5/26/2023  Authorization Period Expiration: 12/31/23  Plan of Care Certification Period: 7/21/2023  Visit # / Visits authorized: 1/ 20 (plus evaluation)  Insurance: BLUE CROSS BLUE SHIELD  FOTO: 1/3     Time In: 4:10 PM  Time Out: 5:06 PM  Total Billable Time: 54 minutes     Precautions: Standard and cancer         Subjective     Pt reports: no new complaints. Hasn't had a chance to try the exercises she was given last time. Reaching overhead and forward and down and forward (ie in a cabinet or under the couch/bed) is most challenging.  She was not compliant with home exercise program.  Response to previous treatment: no adverse reaction  Pain Scale: Malika rates pain on a scale of 0/10 on VAS.   Pain location: N/A     Fatigue: None stated  Functional change: none reported  Treatment:   Chemotherapy: N/A  Radiation: N/A  Endocrine therapy:  Arimidex     Surgery date: Mastectomy (Bilateral, 9/21/2022); [place] lymph node biopsy (Left, 9/21/2022); and Injection for sentinel node identification (Left, 9/21/2022).      Objective     Objective Measures updated at progress report unless specified.     Treatment     Malika received individual therapeutic exercises to improve postural correction and alignment, stretching and soft tissue mobility, and strengthening for 44 minutes including the following:     Pulleys, 2 min each of flexion/scaption  Scapular retractions around  towel roll, 2 x 10 reps  UBE, level1, 1 min, backwards  Pec stretch supine, no towel, 2 min  PROM to each shoulder by PT, 5-8 reps of Flexion, ABD, ER at 90  Butterflies x 10 reps  LTR with hands behind head x 10 reps B  Rows with blue band, 2 x 10 reps  Shoulder ext with green band, 10 reps  Shoulder flexion rollout with blue swiss ball, 10 reps  Shoulder IR stretch with towel, 6-10 reps/side    Malika received the following manual therapy techniques were performed to increased myofascial/soft tissue length, mobility and pliability, increase PROM, AROM and function as well as to decrease pain for 10 minutes    -Gentle STM and side-bending to B pecs (left > right)  -posterior capsule stretch by PT, bilateral  -prolonged pec stretch by PT, bilateral  -Gentle STM to left upper trap    Home Exercise Program and Patient Education   Education provided re:  - role of PT in multi - disciplinary team, goals for PT  - progress towards goals   - exercise technique, HEP updated    Written Home Exercises Provided: yes.  Exercises were reviewed and Malika was able to demonstrate them prior to the end of the session.  Malika demonstrated good  understanding of the education provided.     See EMR under Patient Instructions for exercises provided 5/30/2023 and Eval.    Pt has no cultural, educational or language barriers to learning provided.    Assessment     Patient is responding well to physical therapy. She was able to complete all exercises without issue. She needs occasional cues for technique and performing exercise in a comfortable range. Will gauge response at next session and progress as tolerated.    Pt prognosis is Good. Pt will continue to benefit from skilled outpatient physical therapy to address the deficits listed in the problem list chart on initial evaluation, provide pt/family education and to maximize pt's level of independence in the home and community environment.     Anticipated barriers to physical  therapy: none anticipated    Goals as follows:  Short Term goals: 4 weeks  1. Patient will demonstrate 100% understanding of lymphedema risk reduction practices to include self monitoring for lymphedema. (progressing, not met)  2. Patient will demonstrate independence with Home Exercise program established. (progressing, not met)  3. Pt will increase AROM/PROM in shoulder external rotation ROM to 90 degrees bilaterally to improve functional reach, carry, push, pull pain free. (progressing, not met)  4. Pt will increase AROM/PROM in shoulder flexion to 180 degrees on the left to improve functional reach, carry, push, pull pain free.(progressing, not met)  5. Pt will increase strength to >/= 4+/5 in gross UE musculature to improve tolerance to all functional activities pain free. (progressing, not met)     Long Term Goals: 8 weeks   1.  Pt will increase AROM/PROM in shoulder internal rotation to 90 degrees bilaterally to improve functional reach, carry, push, pull pain free. (progressing, not met)  2. Pt will increase strength to 4+/5 in gross UE musculature to improve tolerance to all functional activities pain free. (progressing, not met)  3. Pt will demonstrate full/maximized tissue mobility to increase ROM and promote healthy tissue to be pain free at discharge. (progressing, not met)  4. Pt will report decrease in overall worst pain to 2/10 at discharge. (progressing, not met)  5. Pt will increase strength to 5/5 in gross UE musculature to improve tolerance to all functional activities pain free. (progressing, not met)  6. Patient will report compliance with walking program 5x week for 30 min each day to improve overall cardiovascular function and decrease cancer related fatigue at discharge. (progressing, not met)        Plan   Outpatient physical therapy 2x week for 8 weeks to include the following:   Manual Therapy, Neuromuscular Re-ed, Patient Education, Self Care, Therapeutic Activities, Therapeutic  Exercise, and IASTM . Dry needling with manual therapy techniques to decrease pain, inflammation and swelling, increase circulation and promote healing process.     Plan of care Certification Period: 5/26/2023 to 07/21/2023.    Therapist: Rubia Blackmon, PT  5/30/2023

## 2023-05-30 NOTE — PATIENT INSTRUCTIONS
ROM: Flexion - Wand (Supine)        Lie on back holding wand. Raise arms over head.   Repeat ___10_ times per set. Do __1__ set per session. Do __1-2__ sessions per day.  Copyright © [employer]. All rights reserved.

## 2023-06-02 ENCOUNTER — OFFICE VISIT (OUTPATIENT)
Dept: HEMATOLOGY/ONCOLOGY | Facility: CLINIC | Age: 58
End: 2023-06-02
Payer: COMMERCIAL

## 2023-06-02 ENCOUNTER — CLINICAL SUPPORT (OUTPATIENT)
Dept: REHABILITATION | Facility: HOSPITAL | Age: 58
End: 2023-06-02
Payer: COMMERCIAL

## 2023-06-02 VITALS
HEIGHT: 61 IN | HEART RATE: 94 BPM | RESPIRATION RATE: 17 BRPM | BODY MASS INDEX: 27.01 KG/M2 | DIASTOLIC BLOOD PRESSURE: 82 MMHG | OXYGEN SATURATION: 98 % | TEMPERATURE: 97 F | WEIGHT: 143.06 LBS | SYSTOLIC BLOOD PRESSURE: 131 MMHG

## 2023-06-02 DIAGNOSIS — M25.611 DECREASED RANGE OF MOTION OF RIGHT SHOULDER: ICD-10-CM

## 2023-06-02 DIAGNOSIS — Z17.0 CARCINOMA OF AXILLARY TAIL OF LEFT BREAST IN FEMALE, ESTROGEN RECEPTOR POSITIVE: Primary | ICD-10-CM

## 2023-06-02 DIAGNOSIS — C50.612 CARCINOMA OF AXILLARY TAIL OF LEFT BREAST IN FEMALE, ESTROGEN RECEPTOR POSITIVE: Primary | ICD-10-CM

## 2023-06-02 DIAGNOSIS — R29.898 WEAKNESS OF BOTH UPPER EXTREMITIES: Primary | ICD-10-CM

## 2023-06-02 DIAGNOSIS — Z79.811 PROPHYLACTIC USE OF ANASTROZOLE (ARIMIDEX): ICD-10-CM

## 2023-06-02 DIAGNOSIS — Z91.89 AT RISK FOR LYMPHEDEMA: ICD-10-CM

## 2023-06-02 DIAGNOSIS — M25.612 DECREASED RANGE OF MOTION OF SHOULDER, LEFT: ICD-10-CM

## 2023-06-02 DIAGNOSIS — D05.12 DUCTAL CARCINOMA IN SITU (DCIS) OF LEFT BREAST: ICD-10-CM

## 2023-06-02 PROCEDURE — 97112 NEUROMUSCULAR REEDUCATION: CPT

## 2023-06-02 PROCEDURE — 97140 MANUAL THERAPY 1/> REGIONS: CPT

## 2023-06-02 PROCEDURE — 3008F BODY MASS INDEX DOCD: CPT | Mod: CPTII,S$GLB,, | Performed by: INTERNAL MEDICINE

## 2023-06-02 PROCEDURE — 1159F MED LIST DOCD IN RCRD: CPT | Mod: CPTII,S$GLB,, | Performed by: INTERNAL MEDICINE

## 2023-06-02 PROCEDURE — 99213 PR OFFICE/OUTPT VISIT, EST, LEVL III, 20-29 MIN: ICD-10-PCS | Mod: S$GLB,,, | Performed by: INTERNAL MEDICINE

## 2023-06-02 PROCEDURE — 99999 PR PBB SHADOW E&M-EST. PATIENT-LVL III: ICD-10-PCS | Mod: PBBFAC,,, | Performed by: INTERNAL MEDICINE

## 2023-06-02 PROCEDURE — 3079F PR MOST RECENT DIASTOLIC BLOOD PRESSURE 80-89 MM HG: ICD-10-PCS | Mod: CPTII,S$GLB,, | Performed by: INTERNAL MEDICINE

## 2023-06-02 PROCEDURE — 99213 OFFICE O/P EST LOW 20 MIN: CPT | Mod: S$GLB,,, | Performed by: INTERNAL MEDICINE

## 2023-06-02 PROCEDURE — 99999 PR PBB SHADOW E&M-EST. PATIENT-LVL III: CPT | Mod: PBBFAC,,, | Performed by: INTERNAL MEDICINE

## 2023-06-02 PROCEDURE — 3079F DIAST BP 80-89 MM HG: CPT | Mod: CPTII,S$GLB,, | Performed by: INTERNAL MEDICINE

## 2023-06-02 PROCEDURE — 97110 THERAPEUTIC EXERCISES: CPT

## 2023-06-02 PROCEDURE — 1159F PR MEDICATION LIST DOCUMENTED IN MEDICAL RECORD: ICD-10-PCS | Mod: CPTII,S$GLB,, | Performed by: INTERNAL MEDICINE

## 2023-06-02 PROCEDURE — 3075F PR MOST RECENT SYSTOLIC BLOOD PRESS GE 130-139MM HG: ICD-10-PCS | Mod: CPTII,S$GLB,, | Performed by: INTERNAL MEDICINE

## 2023-06-02 PROCEDURE — 3008F PR BODY MASS INDEX (BMI) DOCUMENTED: ICD-10-PCS | Mod: CPTII,S$GLB,, | Performed by: INTERNAL MEDICINE

## 2023-06-02 PROCEDURE — 3075F SYST BP GE 130 - 139MM HG: CPT | Mod: CPTII,S$GLB,, | Performed by: INTERNAL MEDICINE

## 2023-06-02 NOTE — PROGRESS NOTES
Physical Therapy Daily Treatment Note       Date: 6/2/2023   Name: Malika Curtis  Clinic Number: [id number]    Therapy Diagnosis:   Encounter Diagnoses   Name Primary?    Weakness of both upper extremities Yes    Decreased range of motion of shoulder, left     Decreased range of motion of right shoulder     At risk for lymphedema      Physician: Roxana Stahl MD    Physician Orders: PT Eval and Treat   Medical Diagnosis: Z90.13 (ICD-10-CM) - Status post bilateral mastectomy  Evaluation Date: 5/26/2023  Authorization Period Expiration: 12/29/23  Plan of Care Certification Period: 7/21/2023  Visit # / Visits authorized: 1/ 20 (plus evaluation)  Insurance: BLUE CROSS BLUE SHIELD  FOTO: 1/3     Time In: 1:55 PM  Time Out:2:55  PM  Total Billable Time: 60 minutes     Precautions: Standard and cancer     Subjective     Pt reports: had some pain in her underarms L>R, did her HEP no difficulty. She is still having difficulty with reaching up into cabinets and reaching down to the floor.   She was compliant with home exercise program.  Response to previous treatment: no adverse reactions  Pain Scale: Malika rates pain on a scale of 1/10 on VAS.   Pain location: L axilla     Fatigue: None stated  Functional change: none reported  Treatment:   Chemotherapy: N/A  Radiation: N/A  Endocrine therapy:  Arimidex     Surgery date: Mastectomy (Bilateral, 9/21/2022); [facility] lymph node biopsy (Left, 9/21/2022); and Injection for sentinel node identification (Left, 9/21/2022).      Objective     Objective Measures updated at progress report unless specified.     Shoulder Range of Motion:   Active /Passive ROM Right Left   Flexion 175 175   Abduction 180 180   Extension 65 60   IR/90deg 90 90   ER/90deg 95 95      Treatment     Malika received therapeutic exercises to develop strength, endurance, ROM, flexibility, posture and core stabilization for 35 minutes including:   Pulleys,  2 min each of flexion/scaption  UBE, level1, 1 min, backwards - held  PROM to each shoulder by PT, 5-8 reps of Flexion, ABD, ER at 90  LTR with hands behind head x 15 reps B  Open book, bilateral, 1 set x 10 reps  Rows with blue band, 2 x 10 reps  Shoulder ext with green band, 10 reps  Physioball rollout with blue swiss ball, 3 ways, 10 reps each  Shoulder IR stretch with towel, 10 reps/side      Malika participated in neuromuscular re-education activities to improve: Balance, Coordination, Proprioception and Posture for 15 minutes. The following activities were included:  Pec stretch supine, no towel, 2 min  Scapular retractions around towel roll, 2 x 10 reps  B shoulder ER, green, 2 sets x 10 reps  Horizontal abd, red band, 2 sets x 10 reps    Malika received the following manual therapy techniques were performed to increased myofascial/soft tissue length, mobility and pliability, increase PROM, AROM and function as well as to decrease pain for 10 minutes    -Gentle STM and side-bending to B pecs (left > right)  -posterior capsule stretch by PT, bilateral  -prolonged pec stretch by PT, bilateral    Home Exercise Program and Patient Education   Education provided re:  - role of PT in multi - disciplinary team, goals for PT  - progress towards goals   - exercise technique, HEP updated    Written Home Exercises Provided: yes.  Exercises were reviewed and Malika was able to demonstrate them prior to the end of the session.  Malika demonstrated good  understanding of the education provided.     See EMR under Patient Instructions for exercises provided 5/30/2023 and Eval.    Pt has no cultural, educational or language barriers to learning provided.    Assessment     Patient is responding well to physical therapy. She was able to demonstrate AROM of both shoulders WNL and pain free at the start of the session. She was able to perform all of today's progressions and new exercises with no increase in symptoms prior  to leaving the clinic. She required frequent cues for posture and technique with her exercises. Patient has met the goals noted below. Will progress as tolerated.    Pt prognosis is Good. Pt will continue to benefit from skilled outpatient physical therapy to address the deficits listed in the problem list chart on initial evaluation, provide pt/family education and to maximize pt's level of independence in the home and community environment.     Anticipated barriers to physical therapy: none anticipated    Goals as follows:  Short Term goals: 4 weeks  1. Patient will demonstrate 100% understanding of lymphedema risk reduction practices to include self monitoring for lymphedema. (progressing, not met)  2. Patient will demonstrate independence with Home Exercise program established. (progressing, not met)  3. Pt will increase AROM/PROM in shoulder external rotation ROM to 90 degrees bilaterally to improve functional reach, carry, push, pull pain free. (Met, 6/2/23)  4. Pt will increase AROM/PROM in shoulder flexion to 180 degrees on the left to improve functional reach, carry, push, pull pain free.(progressing, not met)  5. Pt will increase strength to >/= 4+/5 in gross UE musculature to improve tolerance to all functional activities pain free. (progressing, not met)     Long Term Goals: 8 weeks   1.  Pt will increase AROM/PROM in shoulder internal rotation to 90 degrees bilaterally to improve functional reach, carry, push, pull pain free. (Met, 6/2/23)  2. Pt will increase strength to 4+/5 in gross UE musculature to improve tolerance to all functional activities pain free. (progressing, not met)  3. Pt will demonstrate full/maximized tissue mobility to increase ROM and promote healthy tissue to be pain free at discharge. (progressing, not met)  4. Pt will report decrease in overall worst pain to 2/10 at discharge. (progressing, not met)  5. Pt will increase strength to 5/5 in gross UE musculature to improve  tolerance to all functional activities pain free. (progressing, not met)  6. Patient will report compliance with walking program 5x week for 30 min each day to improve overall cardiovascular function and decrease cancer related fatigue at discharge. (progressing, not met)        Plan   Outpatient physical therapy 2x week for 8 weeks to include the following:   Manual Therapy, Neuromuscular Re-ed, Patient Education, Self Care, Therapeutic Activities, Therapeutic Exercise, and IASTM . Dry needling with manual therapy techniques to decrease pain, inflammation and swelling, increase circulation and promote healing process.     Plan of care Certification Period: 5/26/2023 to 07/21/2023.    Therapist: Catherine Glover, PT  6/2/2023

## 2023-06-02 NOTE — PROGRESS NOTES
Subjective:       Patient ID: Malika Curtis is a 58 y.o. female.    Chief Complaint: No chief complaint on file.      HPI     Mrs. Curtis returns today for follow-up  I had last seen her in early March 2023.  As of November 1, 2022 she is on Arimidex in the adjuvant setting, and she has tolerated it well so far without any side effects.  Her DXA scan shows normal bone density.    Of note, she had undergone bilateral mastectomies with reconstruction on September 21, 2022.  The pathology report indicates that she had a 30 mm left-sided IDCA.  Resection margins were clear while the sentinel lymph node was negative.  There was no invasive or in situ cancer identified in the right mastectomy specimen.  Her Oncotype score is 19 indicating a 94% chance of disease-free survival at 10 years with hormonal therapy alone.  The benefit of chemotherapy was estimated at less than 1%.    Briefly, she is a 58-year-old female with a recent diagnosis of breast cancer referred for evaluation.  Apparently she had an abnormal mammogram on 05/23/2022 that showed a focal asymmetry at the upper outer anterior position of the left breast.  A diagnostic mammogram and ultrasound on 06/22/2022 showed a 6.6 cm area of calcifications.  The ultrasound showed an irregular hypoechoic mass with angular and indistinct margins measuring 1.5 cm.    A biopsy was performed on 08/08/2022.  The area of calcifications showed DCIS, low grade that was ER positive and NY positive.  The mass at the 2 o'clock position showed a grade 1 invasive ductal carcinoma measuring at least 9 mm which was ER strongly positive, NY strongly positive, HER2 negative with a Ki-67 of 8%       ROS:  Overall she feels well and she has no complaints.  Her ECOG PS is 0.  She has tolerated the anastrozole well so far.  She denies any depression, easy bruising, fevers, chills, night  sweats, weight loss, nausea, vomiting, diarrhea, constipation, diplopia, blurred vision, headache,  palpitations, shortness of breath, abdominal pain, extremity pain, or difficulty ambulating.  The remainder of the ten-point ROS, including general, skin, lymph, H/N, cardiorespiratory, GI, , Neuro, Endocrine, and psychiatric is negative.     Objective:      Physical Exam      She is alert, oriented to time, place, person, pleasant, well      nourished, in no acute physical distress.                                   VITAL SIGNS:  Reviewed                                      HEENT:  Normal.  There are no nasal, oral, lip, gingival, auricular, lid,    or conjunctival lesions.  Mucosae are moist and pink, and there is no        thrush.  Pupils are equal, reactive to light and accommodation.              Extraocular muscle movements are intact.  Dentition is good.  There is no frontal or maxillary tenderness.                                     NECK:  Supple without JVD, adenopathy, or thyromegaly.                       LUNGS:  Clear to auscultation without wheezing, rales, or rhonchi.           CARDIOVASCULAR:  Reveals an S1, S2, no murmurs, no rubs, no gallops.         ABDOMEN:  Soft, nontender, without organomegaly.  Bowel sounds are present.                                                                     EXTREMITIES:  No cyanosis, clubbing, or edema.                               BREASTS:   She is status post bilateral mastectomies.  Her incisions have healed nicely and there is no evidence of recurrence                                 LYMPHATIC:  There is no cervical, axillary, or supraclavicular adenopathy.   SKIN:  Warm and moist, without petechiae, rashes, induration, or ecchymoses.           NEUROLOGIC:  DTRs are 0-1+ bilaterally, symmetrical, motor function is 5/5,  and cranial nerves are  within normal limits.    Assessment:       1.  Grade 2 IDCA, left breast, T2 N0 M0 ER strongly positive, MD positive, and HER2 negative, now status post bilateral mastectomies       2. Adjuvant use of  anastrozole  Plan:         I would a long discussion with Mrs. Curtis.  She will remain on anastrozole which she will take through the end of October 2027.  I will see her again in 3 months.      Her multiple questions were answered to her satisfaction.        Route Chart for Scheduling    Med Onc Chart Routing      Follow up with physician 3 months.   Follow up with ANDREA    Infusion scheduling note    Injection scheduling note    Labs    Imaging    Pharmacy appointment    Other referrals

## 2023-06-02 NOTE — PATIENT INSTRUCTIONS
prayer stretch    - Begin seated in a chair, with good posture, core tight.  Place a large physioball in between your legs.  Place hands on physioball and slowly bend forward as far as you can, allowing the ball to roll forward with you.  Hold for the desired amount of time and roll back up into starting position. Now, repeat, but instead of rolling forward, roll towards the left (about 45 degrees) so you feel a stretch in the RIGHT side of the back. Now, repeat, but instead of rolling forward, roll towards the right (about 45 degrees) so you feel a stretch in the LEFT side of the back.  Do 10 reps in each direction.

## 2023-06-06 ENCOUNTER — CLINICAL SUPPORT (OUTPATIENT)
Dept: REHABILITATION | Facility: HOSPITAL | Age: 58
End: 2023-06-06
Payer: COMMERCIAL

## 2023-06-06 DIAGNOSIS — M25.612 DECREASED RANGE OF MOTION OF SHOULDER, LEFT: ICD-10-CM

## 2023-06-06 DIAGNOSIS — Z91.89 AT RISK FOR LYMPHEDEMA: ICD-10-CM

## 2023-06-06 DIAGNOSIS — M25.611 DECREASED RANGE OF MOTION OF RIGHT SHOULDER: ICD-10-CM

## 2023-06-06 DIAGNOSIS — R29.898 WEAKNESS OF BOTH UPPER EXTREMITIES: Primary | ICD-10-CM

## 2023-06-06 PROCEDURE — 97140 MANUAL THERAPY 1/> REGIONS: CPT

## 2023-06-06 PROCEDURE — 97110 THERAPEUTIC EXERCISES: CPT

## 2023-06-06 PROCEDURE — 97112 NEUROMUSCULAR REEDUCATION: CPT

## 2023-06-06 NOTE — PROGRESS NOTES
Physical Therapy Daily Treatment Note       Date: 6/6/2023   Name: Malika Curtis  Clinic Number: [id number]    Therapy Diagnosis:   Encounter Diagnoses   Name Primary?    Weakness of both upper extremities Yes    Decreased range of motion of shoulder, left     Decreased range of motion of right shoulder     At risk for lymphedema        Physician: Roxana Stahl MD    Physician Orders: PT Eval and Treat   Medical Diagnosis: Z90.13 (ICD-10-CM) - Status post bilateral mastectomy  Evaluation Date: 5/26/2023  Authorization Period Expiration: 12/29/23  Plan of Care Certification Period: 7/21/2023  Visit # / Visits authorized: 3/ 20 (plus evaluation)  Insurance: BLUE CROSS BLUE SHIELD  FOTO: 1/3     Time In: 10:04 am  Time Out: 11:00 am  Total Billable Time: 56 minutes     Precautions: Standard and cancer     Subjective     Pt reports:  doing good today. She has been working on her reaching.  She was compliant with home exercise program.  Response to previous treatment: a little soreness, not bad  Pain Scale: Malika rates pain on a scale of 1/10 on VAS.   Pain location: B pecs    Fatigue: None stated  Functional change: none reported  Treatment:   Chemotherapy: N/A  Radiation: N/A  Endocrine therapy:  Arimidex     Surgery date: Mastectomy (Bilateral, 9/21/2022); [facility] lymph node biopsy (Left, 9/21/2022); and Injection for sentinel node identification (Left, 9/21/2022).      Objective     Objective Measures updated at progress report unless specified.        Treatment     Malika received therapeutic exercises to develop strength, endurance, ROM, flexibility, posture and core stabilization for 31 minutes including:   Pulleys, 2 min each of flexion/scaption  UBE, level1, 2'/2'  PROM to each shoulder by PT, 5-8 reps of Flexion, ABD, ER at 90  LTR with hands behind head, 2'  Open book, bilateral, 1 set x 10 reps  Rows with blue band, 2 x 10 reps  Shoulder ext with  green band, 10 reps  Physioball rollout with blue swiss ball, 3 ways, 2'  Shoulder IR stretch with towel, 10 reps/side  Triceps ext, green tube, 2 sets x 10 reps    Malika participated in neuromuscular re-education activities to improve: Balance, Coordination, Proprioception and Posture for 15 minutes. The following activities were included:  Pec stretch supine, no towel, 2 min  Scapular retractions around towel roll, 2 x 10 reps  B shoulder ER around towel roll, red band, 2 sets x 10 reps  Horizontal abd around towel roll, red band, 2 sets x 10 reps  Diagonals around towel roll, red band, 1 set x 10 reps    Malika received the following manual therapy techniques were performed to increased myofascial/soft tissue length, mobility and pliability, increase PROM, AROM and function as well as to decrease pain for 10 minutes    -Gentle STM and side-bending to B pecs (left > right)  - Gentle STM to L latissimus dorsi    Home Exercise Program and Patient Education   Education provided re:  - role of PT in multi - disciplinary team, goals for PT  - progress towards goals   - exercise technique, HEP updated    Written Home Exercises Provided: yes.  Exercises were reviewed and Malika was able to demonstrate them prior to the end of the session.  Malika demonstrated good  understanding of the education provided.     See EMR under Patient Instructions for exercises provided 5/30/2023 and Eval.    Pt has no cultural, educational or language barriers to learning provided.    Assessment     Patient is responding well to physical therapy. Patient was able to perform all of today's progressions and new exercises with no increase in symptoms prior to leaving the clinic. She required occasional cues for posture with her seated and standing exercises this session. Will progress as tolerated.    Pt prognosis is Good. Pt will continue to benefit from skilled outpatient physical therapy to address the deficits listed in the  problem list chart on initial evaluation, provide pt/family education and to maximize pt's level of independence in the home and community environment.     Anticipated barriers to physical therapy: none anticipated    Goals as follows:  Short Term goals: 4 weeks  1. Patient will demonstrate 100% understanding of lymphedema risk reduction practices to include self monitoring for lymphedema. (progressing, not met)  2. Patient will demonstrate independence with Home Exercise program established. (progressing, not met)  3. Pt will increase AROM/PROM in shoulder external rotation ROM to 90 degrees bilaterally to improve functional reach, carry, push, pull pain free. (Met, 6/2/23)  4. Pt will increase AROM/PROM in shoulder flexion to 180 degrees on the left to improve functional reach, carry, push, pull pain free.(progressing, not met)  5. Pt will increase strength to >/= 4+/5 in gross UE musculature to improve tolerance to all functional activities pain free. (progressing, not met)     Long Term Goals: 8 weeks   1.  Pt will increase AROM/PROM in shoulder internal rotation to 90 degrees bilaterally to improve functional reach, carry, push, pull pain free. (Met, 6/2/23)  2. Pt will increase strength to 4+/5 in gross UE musculature to improve tolerance to all functional activities pain free. (progressing, not met)  3. Pt will demonstrate full/maximized tissue mobility to increase ROM and promote healthy tissue to be pain free at discharge. (progressing, not met)  4. Pt will report decrease in overall worst pain to 2/10 at discharge. (progressing, not met)  5. Pt will increase strength to 5/5 in gross UE musculature to improve tolerance to all functional activities pain free. (progressing, not met)  6. Patient will report compliance with walking program 5x week for 30 min each day to improve overall cardiovascular function and decrease cancer related fatigue at discharge. (progressing, not met)        Plan   Outpatient  physical therapy 2x week for 8 weeks to include the following:   Manual Therapy, Neuromuscular Re-ed, Patient Education, Self Care, Therapeutic Activities, Therapeutic Exercise, and IASTM . Dry needling with manual therapy techniques to decrease pain, inflammation and swelling, increase circulation and promote healing process.     Plan of care Certification Period: 5/26/2023 to 07/21/2023.    Therapist: Catherine Glover, PT  6/6/2023

## 2023-06-06 NOTE — PATIENT INSTRUCTIONS
"      Banded Shoulder Diagonals    Standing with tall posture and engaged core.    Pull the band across your chest in diagonal direction.    Keep elbows straight through the entire pull, squeezing your shoulder blades together as you pull.    Hand going up should be "thumb up" hand position.     Repeat both sides.   Reps 10 per set. Do 3 sets per session. Do 1 session per day.         ELASTIC BAND TRICEPS EXTENSION - SELF FIXATION    While seated, hold and fixate one end of an elastic band against your chest. Hold the other end with your opposite hand with your elbow bent and arm by your side.     Start by pulling the band downward so that the elbow goes from a bent position to a straightened position as shown. Return to starting position and repeat.   Do 10 reps per set. Do 3 sets per session. Do 1 session per day.      "

## 2023-06-09 ENCOUNTER — CLINICAL SUPPORT (OUTPATIENT)
Dept: REHABILITATION | Facility: HOSPITAL | Age: 58
End: 2023-06-09
Payer: COMMERCIAL

## 2023-06-09 DIAGNOSIS — M25.612 DECREASED RANGE OF MOTION OF SHOULDER, LEFT: ICD-10-CM

## 2023-06-09 DIAGNOSIS — M25.611 DECREASED RANGE OF MOTION OF RIGHT SHOULDER: ICD-10-CM

## 2023-06-09 DIAGNOSIS — Z91.89 AT RISK FOR LYMPHEDEMA: ICD-10-CM

## 2023-06-09 DIAGNOSIS — R29.898 WEAKNESS OF BOTH UPPER EXTREMITIES: Primary | ICD-10-CM

## 2023-06-09 PROCEDURE — 97140 MANUAL THERAPY 1/> REGIONS: CPT

## 2023-06-09 PROCEDURE — 97110 THERAPEUTIC EXERCISES: CPT

## 2023-06-09 PROCEDURE — 97112 NEUROMUSCULAR REEDUCATION: CPT

## 2023-06-09 NOTE — PROGRESS NOTES
Physical Therapy Daily Treatment Note       Date: 6/9/2023   Name: Malika Curtis  Clinic Number: [id number]    Therapy Diagnosis:   Encounter Diagnoses   Name Primary?    Weakness of both upper extremities Yes    Decreased range of motion of shoulder, left     Decreased range of motion of right shoulder     At risk for lymphedema          Physician: Roxana Stahl MD    Physician Orders: PT Eval and Treat   Medical Diagnosis: Z90.13 (ICD-10-CM) - Status post bilateral mastectomy  Evaluation Date: 5/26/2023  Authorization Period Expiration: 12/29/23  Plan of Care Certification Period: 7/21/2023  Visit # / Visits authorized: 4/ 20 (plus evaluation)  Insurance: BLUE CROSS BLUE SHIELD  FOTO: 2/3     Time In: 9:00 AM  Time Out: 10:00 AM  Total Billable Time: 60 minutes     Precautions: Standard and cancer     Subjective     Pt reports:  she is doing ok this morning, she did not realize how tight her chest was until last visit. She is not sure if she is doing the shoulder blade exercise correctly and would like to go over it today.   She was compliant with home exercise program.  Response to previous treatment: a little soreness  Pain Scale: Malika rates pain on a scale of 3/10 on VAS.   Pain location: B pecs    Fatigue: None stated  Functional change: none reported  Treatment:   Chemotherapy: N/A  Radiation: N/A  Endocrine therapy:  Arimidex     Surgery date: Mastectomy (Bilateral, 9/21/2022); [place] lymph node biopsy (Left, 9/21/2022); and Injection for sentinel node identification (Left, 9/21/2022).      Objective     Objective Measures updated at progress report unless specified.        Treatment     Malika received therapeutic exercises to develop strength, endurance, ROM, flexibility, posture and core stabilization for 30 minutes including:   Pulleys, 2 min each of flexion/scaption  UBE, seat 1, level 1, 2'/2'  PROM to each shoulder by PT, 10 reps of Flexion,  ABD, ER at 90  LTR with hands behind head, 2'  Open book, bilateral, 1 set x 10 reps  Rows with blue band, 2 x 10 reps  Shoulder ext with green band, 2 sets x 10 reps  Physioball rollout with blue swiss ball, 3 ways, 2'  Shoulder IR stretch with towel, 10 reps/side  Triceps ext, green tube, 2 sets x 10 reps    Malika participated in neuromuscular re-education activities to improve: Balance, Coordination, Proprioception and Posture for 20 minutes. The following activities were included:  Pec stretch supine, no towel, 2 min  Scapular retractions around towel roll, 2 x 10 reps  B shoulder ER around towel roll, red band, 2 sets x 10 reps  Horizontal abd around towel roll, red band, 2 sets x 10 reps  Diagonals around towel roll, red band, 1 set x 10 reps    Malika received the following manual therapy techniques were performed to increased myofascial/soft tissue length, mobility and pliability, increase PROM, AROM and function as well as to decrease pain for 10 minutes    -Gentle STM and side-bending to B pecs (left > right)  - Gentle STM to L latissimus dorsi    Home Exercise Program and Patient Education   Education provided re:  - role of PT in multi - disciplinary team, goals for PT  - progress towards goals   - exercise technique, HEP updated    Written Home Exercises Provided: yes.  Exercises were reviewed and Malika was able to demonstrate them prior to the end of the session.  Malika demonstrated good  understanding of the education provided.     See EMR under Patient Instructions for exercises provided 5/30/2023 and Eval.    Pt has no cultural, educational or language barriers to learning provided.    Assessment     Patient is responding well to physical therapy. Patient was able to perform all of today's progressions with no increase in symptoms prior to leaving the clinic. She required occasional cues for posture with resistance band exercises. Begin scapula stabilization exercises next visit. Will  progress as tolerated.    Pt prognosis is Good. Pt will continue to benefit from skilled outpatient physical therapy to address the deficits listed in the problem list chart on initial evaluation, provide pt/family education and to maximize pt's level of independence in the home and community environment.     Anticipated barriers to physical therapy: none anticipated    Goals as follows:  Short Term goals: 4 weeks  1. Patient will demonstrate 100% understanding of lymphedema risk reduction practices to include self monitoring for lymphedema. (progressing, not met)  2. Patient will demonstrate independence with Home Exercise program established. (progressing, not met)  3. Pt will increase AROM/PROM in shoulder external rotation ROM to 90 degrees bilaterally to improve functional reach, carry, push, pull pain free. (Met, 6/2/23)  4. Pt will increase AROM/PROM in shoulder flexion to 180 degrees on the left to improve functional reach, carry, push, pull pain free.(progressing, not met)  5. Pt will increase strength to >/= 4+/5 in gross UE musculature to improve tolerance to all functional activities pain free. (progressing, not met)     Long Term Goals: 8 weeks   1.  Pt will increase AROM/PROM in shoulder internal rotation to 90 degrees bilaterally to improve functional reach, carry, push, pull pain free. (Met, 6/2/23)  2. Pt will increase strength to 4+/5 in gross UE musculature to improve tolerance to all functional activities pain free. (progressing, not met)  3. Pt will demonstrate full/maximized tissue mobility to increase ROM and promote healthy tissue to be pain free at discharge. (progressing, not met)  4. Pt will report decrease in overall worst pain to 2/10 at discharge. (progressing, not met)  5. Pt will increase strength to 5/5 in gross UE musculature to improve tolerance to all functional activities pain free. (progressing, not met)  6. Patient will report compliance with walking program 5x week for 30 min  each day to improve overall cardiovascular function and decrease cancer related fatigue at discharge. (progressing, not met)        Plan   Outpatient physical therapy 2x week for 8 weeks to include the following:   Manual Therapy, Neuromuscular Re-ed, Patient Education, Self Care, Therapeutic Activities, Therapeutic Exercise, and IASTM . Dry needling with manual therapy techniques to decrease pain, inflammation and swelling, increase circulation and promote healing process.     Plan of care Certification Period: 5/26/2023 to 07/21/2023.    Therapist: Catherine Glover, PT  6/9/2023

## 2023-06-13 ENCOUNTER — CLINICAL SUPPORT (OUTPATIENT)
Dept: REHABILITATION | Facility: HOSPITAL | Age: 58
End: 2023-06-13
Payer: COMMERCIAL

## 2023-06-13 DIAGNOSIS — Z91.89 AT RISK FOR LYMPHEDEMA: ICD-10-CM

## 2023-06-13 DIAGNOSIS — M25.612 DECREASED RANGE OF MOTION OF SHOULDER, LEFT: ICD-10-CM

## 2023-06-13 DIAGNOSIS — M25.611 DECREASED RANGE OF MOTION OF RIGHT SHOULDER: ICD-10-CM

## 2023-06-13 DIAGNOSIS — R29.898 WEAKNESS OF BOTH UPPER EXTREMITIES: Primary | ICD-10-CM

## 2023-06-13 PROCEDURE — 97112 NEUROMUSCULAR REEDUCATION: CPT

## 2023-06-13 PROCEDURE — 97110 THERAPEUTIC EXERCISES: CPT

## 2023-06-13 PROCEDURE — 97140 MANUAL THERAPY 1/> REGIONS: CPT

## 2023-06-13 NOTE — PROGRESS NOTES
Physical Therapy Daily Treatment Note       Date: 6/13/2023   Name: Malika Curtis  Clinic Number: [id number]    Therapy Diagnosis:   Encounter Diagnoses   Name Primary?    Weakness of both upper extremities Yes    Decreased range of motion of shoulder, left     Decreased range of motion of right shoulder     At risk for lymphedema          Physician: Roxana Stahl MD    Physician Orders: PT Eval and Treat   Medical Diagnosis: Z90.13 (ICD-10-CM) - Status post bilateral mastectomy  Evaluation Date: 5/26/2023  Authorization Period Expiration: 12/29/23  Plan of Care Certification Period: 7/21/2023  Visit # / Visits authorized: 5/ 20 (plus evaluation)  Insurance: BLUE CROSS BLUE SHIELD  FOTO: 2/3     Time In: 10:00 AM  Time Out: 11:00 AM  Total Billable Time: 60 minutes     Precautions: Standard and cancer     Subjective     Pt reports:  feeling pretty good today. She tried the rowing machine at the park the other day. She did 10 strokes and felt it the next day, previously she had been able to do several minutes on the rowing machine.    She was compliant with home exercise program.  Response to previous treatment: felt good, minimal soreness  Pain Scale: Malika rates pain on a scale of 1/10 on VAS.   Pain location: B pecs    Fatigue: Nothing unusual  Functional change: none reported  Treatment:   Chemotherapy: N/A  Radiation: N/A  Endocrine therapy:  Arimidex     Surgery date: Mastectomy (Bilateral, 9/21/2022); [place] lymph node biopsy (Left, 9/21/2022); and Injection for sentinel node identification (Left, 9/21/2022).      Objective     Objective Measures updated at progress report unless specified.        Treatment     Malika received therapeutic exercises to develop strength, endurance, ROM, flexibility, posture and core stabilization for 30 minutes including:     UBE, seat 1, level 1, 2'/2'  PROM to each shoulder by PT, 10 reps of Flexion, ABD, ER at  90  LTR with hands behind head, 2'  Open book, bilateral, 1 set x 10 reps  Rows with blue band, 2 x 10 reps  Shoulder ext with blue band, 2 sets x 10 reps  Physioball rollout with blue swiss ball, 3 ways, 2'  Shoulder IR stretch with towel, 10 reps/side  Triceps ext, blue tube, 1 sets x 10 reps    Malika participated in neuromuscular re-education activities to improve: Balance, Coordination, Proprioception and Posture for 20 minutes. The following activities were included:  Pec stretch supine, towel roll, 2 min  Scapular retractions around towel roll, 1 x 10 reps  B shoulder ER around towel roll, green band, 1 sets x 10 reps  Horizontal abd around towel roll, green band, 1 sets x 10 reps  Diagonals standing, green band, 1 set x 10 reps    Malika received the following manual therapy techniques were performed to increased myofascial/soft tissue length, mobility and pliability, increase PROM, AROM and function as well as to decrease pain for 10 minutes    -Gentle STM and side-bending to B pecs (left > right)  - Gentle STM to L latissimus dorsi    Home Exercise Program and Patient Education   Education provided re:  - role of PT in multi - disciplinary team, goals for PT  - progress towards goals   - exercise technique, HEP updated    Written Home Exercises Provided: yes.  Exercises were reviewed and Malika was able to demonstrate them prior to the end of the session.  Malika demonstrated good  understanding of the education provided.     See EMR under Patient Instructions for exercises provided 5/30/2023 and Eval.    Pt has no cultural, educational or language barriers to learning provided.    Assessment     Patient is responding well to physical therapy. She was able to perform all of today's progressions with no increase in symptoms prior to leaving the clinic. She required occasional cues to keep her stretching in a pain free range and for posture. Will progress as tolerated.    Pt prognosis is Good. Pt  will continue to benefit from skilled outpatient physical therapy to address the deficits listed in the problem list chart on initial evaluation, provide pt/family education and to maximize pt's level of independence in the home and community environment.     Anticipated barriers to physical therapy: none anticipated    Goals as follows:  Short Term goals: 4 weeks  1. Patient will demonstrate 100% understanding of lymphedema risk reduction practices to include self monitoring for lymphedema. (progressing, not met)  2. Patient will demonstrate independence with Home Exercise program established. (progressing, not met)  3. Pt will increase AROM/PROM in shoulder external rotation ROM to 90 degrees bilaterally to improve functional reach, carry, push, pull pain free. (Met, 6/2/23)  4. Pt will increase AROM/PROM in shoulder flexion to 180 degrees on the left to improve functional reach, carry, push, pull pain free.(progressing, not met)  5. Pt will increase strength to >/= 4+/5 in gross UE musculature to improve tolerance to all functional activities pain free. (progressing, not met)     Long Term Goals: 8 weeks   1.  Pt will increase AROM/PROM in shoulder internal rotation to 90 degrees bilaterally to improve functional reach, carry, push, pull pain free. (Met, 6/2/23)  2. Pt will increase strength to 4+/5 in gross UE musculature to improve tolerance to all functional activities pain free. (progressing, not met)  3. Pt will demonstrate full/maximized tissue mobility to increase ROM and promote healthy tissue to be pain free at discharge. (progressing, not met)  4. Pt will report decrease in overall worst pain to 2/10 at discharge. (progressing, not met)  5. Pt will increase strength to 5/5 in gross UE musculature to improve tolerance to all functional activities pain free. (progressing, not met)  6. Patient will report compliance with walking program 5x week for 30 min each day to improve overall cardiovascular function  and decrease cancer related fatigue at discharge. (progressing, not met)        Plan   Outpatient physical therapy 2x week for 8 weeks to include the following:   Manual Therapy, Neuromuscular Re-ed, Patient Education, Self Care, Therapeutic Activities, Therapeutic Exercise, and IASTM . Dry needling with manual therapy techniques to decrease pain, inflammation and swelling, increase circulation and promote healing process.     Plan of care Certification Period: 5/26/2023 to 07/21/2023.    Therapist: Catherine Glover, PT  6/13/2023

## 2023-06-16 ENCOUNTER — CLINICAL SUPPORT (OUTPATIENT)
Dept: REHABILITATION | Facility: HOSPITAL | Age: 58
End: 2023-06-16
Payer: COMMERCIAL

## 2023-06-16 DIAGNOSIS — M25.611 DECREASED RANGE OF MOTION OF RIGHT SHOULDER: ICD-10-CM

## 2023-06-16 DIAGNOSIS — R29.898 WEAKNESS OF BOTH UPPER EXTREMITIES: Primary | ICD-10-CM

## 2023-06-16 DIAGNOSIS — Z91.89 AT RISK FOR LYMPHEDEMA: ICD-10-CM

## 2023-06-16 DIAGNOSIS — M25.612 DECREASED RANGE OF MOTION OF SHOULDER, LEFT: ICD-10-CM

## 2023-06-16 PROCEDURE — 97110 THERAPEUTIC EXERCISES: CPT

## 2023-06-16 PROCEDURE — 97112 NEUROMUSCULAR REEDUCATION: CPT

## 2023-06-16 PROCEDURE — 97140 MANUAL THERAPY 1/> REGIONS: CPT

## 2023-06-16 NOTE — PROGRESS NOTES
Physical Therapy Daily Treatment Note       Date: 6/16/2023   Name: Malika Curtis  Clinic Number: [id number]    Therapy Diagnosis:   Encounter Diagnoses   Name Primary?    Weakness of both upper extremities Yes    Decreased range of motion of shoulder, left     Decreased range of motion of right shoulder     At risk for lymphedema          Physician: Roxana Stahl MD    Physician Orders: PT Eval and Treat   Medical Diagnosis: Z90.13 (ICD-10-CM) - Status post bilateral mastectomy  Evaluation Date: 5/26/2023  Authorization Period Expiration: 12/29/23  Plan of Care Certification Period: 7/21/2023  Visit # / Visits authorized: 6/ 20 (plus evaluation)  Insurance: BLUE CROSS BLUE SHIELD  FOTO: 2/3     Time In: 9:00 am  Time Out:10:00 am  Total Billable Time: 60 minutes     Precautions: Standard and cancer     Subjective     Pt reports:  still feeling it in the pecs, not really feeling it in the pits. She is moving slow today as she spent 3 hours at the dentist recently.  She was compliant with home exercise program.  Response to previous treatment: felt good, minimal soreness  Pain Scale: Malika rates pain on a scale of 1/10 on VAS.   Pain location: B pecs    Fatigue: Nothing unusual  Functional change: none reported  Treatment:   Chemotherapy: N/A  Radiation: N/A  Endocrine therapy:  Arimidex     Surgery date: Mastectomy (Bilateral, 9/21/2022); [place] lymph node biopsy (Left, 9/21/2022); and Injection for sentinel node identification (Left, 9/21/2022).      Objective     Objective Measures updated at progress report unless specified.        Treatment     Malika received therapeutic exercises to develop strength, endurance, ROM, flexibility, posture and core stabilization for 30 minutes including:     UBE, seat 2, level 2, 2'/2'  PROM to each shoulder by PT, 5 reps of Flexion, ABD, ER at 90  LTR with hands behind head, 2'  Open book, bilateral, 1 set x 10  reps  Rows with blue band, 2 x 10 reps  Shoulder ext with blue band, 2 sets x 10 reps  Physioball rollout with blue swiss ball, 3 ways, 2 reps x 2' each  Shoulder IR stretch with towel, 10 reps/side  Triceps ext, blue tube, 1 sets x 10 reps  Doorway stretch, hands high, 2 reps x 30 sec    Malika participated in neuromuscular re-education activities to improve: Balance, Coordination, Proprioception and Posture for 20 minutes. The following activities were included:  Pec stretch supine, towel roll, 2 min - held  Scapular retractions around towel roll, 1 x 10 reps  B shoulder ER around towel roll, green band, 1 sets x 10 reps  Horizontal abd around towel roll, green band, 1 sets x 10 reps  Diagonals standing, green band, 1 set x 10 reps    Malika received the following manual therapy techniques were performed to increased myofascial/soft tissue length, mobility and pliability, increase PROM, AROM and function as well as to decrease pain for 10 minutes    -Gentle STM and side-bending to B pecs (right > left)      Home Exercise Program and Patient Education   Education provided re:  - role of PT in multi - disciplinary team, goals for PT  - progress towards goals   - exercise technique, HEP updated    Written Home Exercises Provided: yes.  Exercises were reviewed and Malika was able to demonstrate them prior to the end of the session.  Malika demonstrated good  understanding of the education provided.     See EMR under Patient Instructions for exercises provided 5/30/2023 and Eval.    Pt has no cultural, educational or language barriers to learning provided.    Assessment     Patient is responding well to physical therapy. She continues to respond well to manual therapy techniques reporting decreased tightness in her pecs afterwards. She was able to perform all of today's activities with no increase in symptoms prior to leaving the clinic. She required one cue for posture with resistance band exercises. Will  progress as tolerated.    Pt prognosis is Good. Pt will continue to benefit from skilled outpatient physical therapy to address the deficits listed in the problem list chart on initial evaluation, provide pt/family education and to maximize pt's level of independence in the home and community environment.     Anticipated barriers to physical therapy: none anticipated    Goals as follows:  Short Term goals: 4 weeks  1. Patient will demonstrate 100% understanding of lymphedema risk reduction practices to include self monitoring for lymphedema. (progressing, not met)  2. Patient will demonstrate independence with Home Exercise program established. (progressing, not met)  3. Pt will increase AROM/PROM in shoulder external rotation ROM to 90 degrees bilaterally to improve functional reach, carry, push, pull pain free. (Met, 6/2/23)  4. Pt will increase AROM/PROM in shoulder flexion to 180 degrees on the left to improve functional reach, carry, push, pull pain free.(progressing, not met)  5. Pt will increase strength to >/= 4+/5 in gross UE musculature to improve tolerance to all functional activities pain free. (progressing, not met)     Long Term Goals: 8 weeks   1.  Pt will increase AROM/PROM in shoulder internal rotation to 90 degrees bilaterally to improve functional reach, carry, push, pull pain free. (Met, 6/2/23)  2. Pt will increase strength to 4+/5 in gross UE musculature to improve tolerance to all functional activities pain free. (progressing, not met)  3. Pt will demonstrate full/maximized tissue mobility to increase ROM and promote healthy tissue to be pain free at discharge. (progressing, not met)  4. Pt will report decrease in overall worst pain to 2/10 at discharge. (progressing, not met)  5. Pt will increase strength to 5/5 in gross UE musculature to improve tolerance to all functional activities pain free. (progressing, not met)  6. Patient will report compliance with walking program 5x week for 30 min  each day to improve overall cardiovascular function and decrease cancer related fatigue at discharge. (progressing, not met)        Plan   Outpatient physical therapy 2x week for 8 weeks to include the following:   Manual Therapy, Neuromuscular Re-ed, Patient Education, Self Care, Therapeutic Activities, Therapeutic Exercise, and IASTM . Dry needling with manual therapy techniques to decrease pain, inflammation and swelling, increase circulation and promote healing process.     Plan of care Certification Period: 5/26/2023 to 07/21/2023.    Therapist: Catherine Glover, PT  6/16/2023

## 2023-06-23 ENCOUNTER — CLINICAL SUPPORT (OUTPATIENT)
Dept: REHABILITATION | Facility: HOSPITAL | Age: 58
End: 2023-06-23
Payer: COMMERCIAL

## 2023-06-23 DIAGNOSIS — M25.611 DECREASED RANGE OF MOTION OF RIGHT SHOULDER: ICD-10-CM

## 2023-06-23 DIAGNOSIS — Z91.89 AT RISK FOR LYMPHEDEMA: ICD-10-CM

## 2023-06-23 DIAGNOSIS — M25.612 DECREASED RANGE OF MOTION OF SHOULDER, LEFT: ICD-10-CM

## 2023-06-23 DIAGNOSIS — R29.898 WEAKNESS OF BOTH UPPER EXTREMITIES: Primary | ICD-10-CM

## 2023-06-23 PROCEDURE — 97112 NEUROMUSCULAR REEDUCATION: CPT

## 2023-06-23 PROCEDURE — 97140 MANUAL THERAPY 1/> REGIONS: CPT

## 2023-06-23 PROCEDURE — 97110 THERAPEUTIC EXERCISES: CPT

## 2023-06-23 NOTE — PROGRESS NOTES
Physical Therapy Daily Treatment Note       Date: 6/23/2023   Name: Malika Curtis  Clinic Number: [id number]     Therapy Diagnosis:   Encounter Diagnoses   Name Primary?    Weakness of both upper extremities Yes    Decreased range of motion of shoulder, left     Decreased range of motion of right shoulder     At risk for lymphedema            Physician: Roxana Stahl MD    Physician Orders: PT Eval and Treat   Medical Diagnosis: Z90.13 (ICD-10-CM) - Status post bilateral mastectomy  Evaluation Date: 5/26/2023  Authorization Period Expiration: 12/29/23  Plan of Care Certification Period: 7/21/2023  Visit # / Visits authorized: 7/ 20 (plus evaluation)  Insurance: BLUE CROSS BLUE SHIELD  FOTO: 2/3     Time In: 9:00 am  Time Out: 9:55 am  Total Billable Time: 55 minutes     Precautions: Standard and cancer     Subjective     Pt reports:  didn't sleep well last night, still feels the pulling in her chest.  She was compliant with home exercise program.  Response to previous treatment: no adverse events  Pain Scale: Malika rates pain on a scale of 1/10 on VAS.   Pain location: B pecs    Fatigue: Nothing unusual  Functional change: none reported  Treatment:   Chemotherapy: N/A  Radiation: N/A  Endocrine therapy:  Arimidex     Surgery date: Mastectomy (Bilateral, 9/21/2022); [place] lymph node biopsy (Left, 9/21/2022); and Injection for sentinel node identification (Left, 9/21/2022).      Objective     Objective Measures updated at progress report unless specified.        Treatment     Malika received therapeutic exercises to develop strength, endurance, ROM, flexibility, posture and core stabilization for 15 minutes including:     UBE, seat 2, level 2, 2'/2'  PROM to each shoulder by PT, 5 reps of Flexion, ABD, ER at 90  LTR with hands behind head, 2'  Open book, bilateral, 1 set x 10 reps  Physioball rollout with blue swiss ball, 3 ways, 1 rep x 2' each  Doorway  stretch, hands high, 2 reps x 30 sec    Malika participated in neuromuscular re-education activities to improve: Balance, Coordination, Proprioception and Posture for 25 minutes. The following activities were included:  Pec stretch supine, 2 towel roll, 2 min  Scapular retractions around towel roll, 1 x 10 reps  B shoulder ER around towel roll, green band, 1 sets x 10 reps  Horizontal abd around towel roll, red band, 1 sets x 10 reps  Diagonals standing, red band, 1 set x 10 reps    Malika received the following manual therapy techniques were performed to increased myofascial/soft tissue length, mobility and pliability, increase PROM, AROM and function as well as to decrease pain for 15 minutes    -Gentle STM and side-bending to B pecs (left > right)      Home Exercise Program and Patient Education   Education provided re:  - role of PT in multi - disciplinary team, goals for PT  - progress towards goals   - exercise technique, HEP updated    Written Home Exercises Provided: yes.  Exercises were reviewed and Malika was able to demonstrate them prior to the end of the session.  Malika demonstrated good  understanding of the education provided.     See EMR under Patient Instructions for exercises provided 5/30/2023 and Eval.    Pt has no cultural, educational or language barriers to learning provided.    Assessment     Patient is responding well to physical therapy. Patient noted to have increased left pec tightness today during manual therapy techniques. The resistance with some of her neuromuscular reeducation exercises was decreased today due to complaints of increased fatigue. She was able to perform all other exercises with no difficulty.  Will progress as tolerated.    Pt prognosis is Good. Pt will continue to benefit from skilled outpatient physical therapy to address the deficits listed in the problem list chart on initial evaluation, provide pt/family education and to maximize pt's level of  independence in the home and community environment.     Anticipated barriers to physical therapy: none anticipated    Goals as follows:  Short Term goals: 4 weeks  1. Patient will demonstrate 100% understanding of lymphedema risk reduction practices to include self monitoring for lymphedema. (progressing, not met)  2. Patient will demonstrate independence with Home Exercise program established. (progressing, not met)  3. Pt will increase AROM/PROM in shoulder external rotation ROM to 90 degrees bilaterally to improve functional reach, carry, push, pull pain free. (Met, 6/2/23)  4. Pt will increase AROM/PROM in shoulder flexion to 180 degrees on the left to improve functional reach, carry, push, pull pain free.(progressing, not met)  5. Pt will increase strength to >/= 4+/5 in gross UE musculature to improve tolerance to all functional activities pain free. (progressing, not met)     Long Term Goals: 8 weeks   1.  Pt will increase AROM/PROM in shoulder internal rotation to 90 degrees bilaterally to improve functional reach, carry, push, pull pain free. (Met, 6/2/23)  2. Pt will increase strength to 4+/5 in gross UE musculature to improve tolerance to all functional activities pain free. (progressing, not met)  3. Pt will demonstrate full/maximized tissue mobility to increase ROM and promote healthy tissue to be pain free at discharge. (progressing, not met)  4. Pt will report decrease in overall worst pain to 2/10 at discharge. (progressing, not met)  5. Pt will increase strength to 5/5 in gross UE musculature to improve tolerance to all functional activities pain free. (progressing, not met)  6. Patient will report compliance with walking program 5x week for 30 min each day to improve overall cardiovascular function and decrease cancer related fatigue at discharge. (progressing, not met)        Plan   Outpatient physical therapy 2x week for 8 weeks to include the following:   Manual Therapy, Neuromuscular Re-ed,  Patient Education, Self Care, Therapeutic Activities, Therapeutic Exercise, and IASTM . Dry needling with manual therapy techniques to decrease pain, inflammation and swelling, increase circulation and promote healing process.     Plan of care Certification Period: 5/26/2023 to 07/21/2023.    Therapist: Catherine Glover, PT  6/23/2023

## 2023-06-27 ENCOUNTER — CLINICAL SUPPORT (OUTPATIENT)
Dept: REHABILITATION | Facility: HOSPITAL | Age: 58
End: 2023-06-27
Payer: COMMERCIAL

## 2023-06-27 DIAGNOSIS — R29.898 WEAKNESS OF BOTH UPPER EXTREMITIES: Primary | ICD-10-CM

## 2023-06-27 DIAGNOSIS — M25.611 DECREASED RANGE OF MOTION OF RIGHT SHOULDER: ICD-10-CM

## 2023-06-27 DIAGNOSIS — M25.612 DECREASED RANGE OF MOTION OF SHOULDER, LEFT: ICD-10-CM

## 2023-06-27 DIAGNOSIS — Z91.89 AT RISK FOR LYMPHEDEMA: ICD-10-CM

## 2023-06-27 PROCEDURE — 97112 NEUROMUSCULAR REEDUCATION: CPT

## 2023-06-27 PROCEDURE — 97110 THERAPEUTIC EXERCISES: CPT

## 2023-06-27 PROCEDURE — 97140 MANUAL THERAPY 1/> REGIONS: CPT

## 2023-06-27 NOTE — PROGRESS NOTES
Physical Therapy Daily Treatment Note       Date: 6/27/2023   Name: Malika Curtis  Clinic Number: [id number]     Therapy Diagnosis:   Encounter Diagnoses   Name Primary?    Weakness of both upper extremities Yes    Decreased range of motion of shoulder, left     Decreased range of motion of right shoulder     At risk for lymphedema            Physician: Roxana Stahl MD    Physician Orders: PT Eval and Treat   Medical Diagnosis: Z90.13 (ICD-10-CM) - Status post bilateral mastectomy  Evaluation Date: 5/26/2023  Authorization Period Expiration: 12/29/23  Plan of Care Certification Period: 7/21/2023  Visit # / Visits authorized: 8/ 20 (plus evaluation)  Insurance: BLUE CROSS BLUE SHIELD  FOTO: 2/3     Time In: 9:00 am  Time Out: 9:55 am  Total Billable Time: 55 minutes     Precautions: Standard and cancer     Subjective     Pt reports: having a toothache this morning, so not doing too good  She was compliant with home exercise program.  Response to previous treatment: some mild soreness  Pain Scale: Malika rates pain on a scale of 1/10 on VAS.   Pain location: B pecs    Fatigue: moderate but maybe due to the toothache  Functional change: more confident with reaching up  Treatment:   Chemotherapy: N/A  Radiation: N/A  Endocrine therapy:  Arimidex     Surgery date: Mastectomy (Bilateral, 9/21/2022); [place] lymph node biopsy (Left, 9/21/2022); and Injection for sentinel node identification (Left, 9/21/2022).      Objective     Objective Measures updated at progress report unless specified.        Treatment     Malika received therapeutic exercises to develop strength, endurance, ROM, flexibility, posture and core stabilization for 15 minutes including:     UBE, seat 2, level 2, 2'/2'  LTR with hands behind head, 2'  Snow ivonne stretch, 2 towels, 3 reps x 30 sec  Open book, bilateral, 1 set x 10 reps  Physioball rollout with blue swiss ball, 3 ways, 2 rep x 2'  each  Doorway stretch, hands high, 2 reps x 30 sec  Shoulder ext, green tube 1 set x 10 reps    Malika participated in neuromuscular re-education activities to improve: Balance, Coordination, Proprioception and Posture for 25 minutes. The following activities were included:  Pec stretch supine, 2 towel roll, 2 min  Scapular retractions around towel roll, 1 x 10 reps  B shoulder ER around towel roll, green band, 2 sets x 10 reps  Horizontal abd around towel roll, red band, 2 sets x 10 reps  Diagonals standing, red band, 1 set x 10 reps  Rows, green tube,  1 set x 10 reps    Malika received the following manual therapy techniques were performed to increased myofascial/soft tissue length, mobility and pliability, increase PROM, AROM and function as well as to decrease pain for 15 minutes  -Gentle STM and side-bending to  R pec  - STM to right lateral chest wall, axilla    Home Exercise Program and Patient Education   Education provided re:  - role of PT in multi - disciplinary team, goals for PT  - progress towards goals   - exercise technique, HEP updated    Written Home Exercises Provided: yes.  Exercises were reviewed and Malika was able to demonstrate them prior to the end of the session.  Malika demonstrated good  understanding of the education provided.     See EMR under Patient Instructions for exercises provided 5/30/2023 and Eval.    Pt has no cultural, educational or language barriers to learning provided.    Assessment     Patient is responding well to physical therapy. Patient was able to perform all of today's progressions and new exercises with no increase in symptoms prior to leaving the clinic. She responded well to manual therapy as she reported feeling less pulling in her pec at the end of the session. Will progress as tolerated.    Pt prognosis is Good. Pt will continue to benefit from skilled outpatient physical therapy to address the deficits listed in the problem list chart on initial  evaluation, provide pt/family education and to maximize pt's level of independence in the home and community environment.     Anticipated barriers to physical therapy: none anticipated    Goals as follows:  Short Term goals: 4 weeks  1. Patient will demonstrate 100% understanding of lymphedema risk reduction practices to include self monitoring for lymphedema. (progressing, not met)  2. Patient will demonstrate independence with Home Exercise program established. (progressing, not met)  3. Pt will increase AROM/PROM in shoulder external rotation ROM to 90 degrees bilaterally to improve functional reach, carry, push, pull pain free. (Met, 6/2/23)  4. Pt will increase AROM/PROM in shoulder flexion to 180 degrees on the left to improve functional reach, carry, push, pull pain free.(progressing, not met)  5. Pt will increase strength to >/= 4+/5 in gross UE musculature to improve tolerance to all functional activities pain free. (progressing, not met)     Long Term Goals: 8 weeks   1.  Pt will increase AROM/PROM in shoulder internal rotation to 90 degrees bilaterally to improve functional reach, carry, push, pull pain free. (Met, 6/2/23)  2. Pt will increase strength to 4+/5 in gross UE musculature to improve tolerance to all functional activities pain free. (progressing, not met)  3. Pt will demonstrate full/maximized tissue mobility to increase ROM and promote healthy tissue to be pain free at discharge. (progressing, not met)  4. Pt will report decrease in overall worst pain to 2/10 at discharge. (progressing, not met)  5. Pt will increase strength to 5/5 in gross UE musculature to improve tolerance to all functional activities pain free. (progressing, not met)  6. Patient will report compliance with walking program 5x week for 30 min each day to improve overall cardiovascular function and decrease cancer related fatigue at discharge. (progressing, not met)        Plan   Outpatient physical therapy 2x week for 8  weeks to include the following:   Manual Therapy, Neuromuscular Re-ed, Patient Education, Self Care, Therapeutic Activities, Therapeutic Exercise, and IASTM . Dry needling with manual therapy techniques to decrease pain, inflammation and swelling, increase circulation and promote healing process.     Plan of care Certification Period: 5/26/2023 to 07/21/2023.    Therapist: Catherine Glover, PT  6/27/2023

## 2023-06-30 ENCOUNTER — CLINICAL SUPPORT (OUTPATIENT)
Dept: REHABILITATION | Facility: HOSPITAL | Age: 58
End: 2023-06-30
Payer: COMMERCIAL

## 2023-06-30 DIAGNOSIS — M25.612 DECREASED RANGE OF MOTION OF SHOULDER, LEFT: ICD-10-CM

## 2023-06-30 DIAGNOSIS — M25.611 DECREASED RANGE OF MOTION OF RIGHT SHOULDER: ICD-10-CM

## 2023-06-30 DIAGNOSIS — Z91.89 AT RISK FOR LYMPHEDEMA: ICD-10-CM

## 2023-06-30 DIAGNOSIS — R29.898 WEAKNESS OF BOTH UPPER EXTREMITIES: Primary | ICD-10-CM

## 2023-06-30 PROCEDURE — 97112 NEUROMUSCULAR REEDUCATION: CPT

## 2023-06-30 PROCEDURE — 97110 THERAPEUTIC EXERCISES: CPT

## 2023-06-30 PROCEDURE — 97140 MANUAL THERAPY 1/> REGIONS: CPT

## 2023-06-30 NOTE — PROGRESS NOTES
Physical Therapy Daily Treatment Note       Date: 6/30/2023   Name: Malika Curtis  Clinic Number: [id number]     Therapy Diagnosis:   Encounter Diagnoses   Name Primary?    Weakness of both upper extremities Yes    Decreased range of motion of shoulder, left     Decreased range of motion of right shoulder     At risk for lymphedema            Physician: Roxana Stahl MD    Physician Orders: PT Eval and Treat   Medical Diagnosis: Z90.13 (ICD-10-CM) - Status post bilateral mastectomy  Evaluation Date: 5/26/2023  Authorization Period Expiration: 12/29/23  Plan of Care Certification Period: 7/21/2023  Visit # / Visits authorized: 9/ 20 (plus evaluation)  Insurance: BLUE CROSS BLUE SHIELD  FOTO: 2/3     Time In: 9:00 am  Time Out: 9:55 am  Total Billable Time: 55 minutes     Precautions: Standard and cancer     Subjective     Pt reports: feeling better today. She has only used the aspercream 2x this week and has not been doing the kt taping.  She was compliant with home exercise program.  Response to previous treatment: some minor soreness  Pain Scale: Malika rates pain on a scale of 1/10 on VAS.   Pain location: B pecs    Fatigue: better  Functional change: more confident with reaching up  Treatment:   Chemotherapy: N/A  Radiation: N/A  Endocrine therapy:  Arimidex     Surgery date: Mastectomy (Bilateral, 9/21/2022); [place] lymph node biopsy (Left, 9/21/2022); and Injection for sentinel node identification (Left, 9/21/2022).      Objective     Objective Measures updated at progress report unless specified.        Treatment     Malika received therapeutic exercises to develop strength, endurance, ROM, flexibility, posture and core stabilization for 15 minutes including:     UBE, seat 2, level 2, 2'/2'  LTR with hands behind head, 2'  Snow ivonne stretch, 2 towels, 3 reps x 30 sec - held  Open book, bilateral, 1 set x 10 reps  Physioball rollout with blue swiss  ball, 3 ways, 1 rep x 2' each  Doorway stretch, hands high, 2 reps x 30 sec  Shoulder ext, green tube 1 set x 10 reps  R shoulder ER, red tube, 1 set x 10 reps    Malika participated in neuromuscular re-education activities to improve: Balance, Coordination, Proprioception and Posture for 25 minutes. The following activities were included:  Pec stretch supine, 2 towel roll, 2 min  Scapular retractions around towel roll, 1 x 10 reps  B shoulder ER around towel roll, red band, 2 sets x 10 reps  Horizontal abd around towel roll, green band, 2 sets x 10 reps  Diagonals standing, red band, 1 set x 10 reps  Rows, green tube,  2 set x 10 reps    Malika received the following manual therapy techniques were performed to increased myofascial/soft tissue length, mobility and pliability, increase PROM, AROM and function as well as to decrease pain for 15 minutes  -Gentle STM and side-bending to  R pec  - STM to right and left lateral chest wall, axilla    Home Exercise Program and Patient Education   Education provided re:  - role of PT in multi - disciplinary team, goals for PT  - progress towards goals   - exercise technique, HEP updated    Written Home Exercises Provided: yes.  Exercises were reviewed and Malika was able to demonstrate them prior to the end of the session.  Malika demonstrated good  understanding of the education provided.     See EMR under Patient Instructions for exercises provided 5/30/2023 and Eval.    Pt has no cultural, educational or language barriers to learning provided.    Assessment     Patient is responding well to physical therapy. Patient continues to respond well to manual therapy techniques as she demonstrated increased tissue mobility and reported less pulling in her pecs at the end of the session. She was able to perform all of today's exercises with no increase in symptoms prior to leaving the clinic. Patient is progressing well towards her goals and will decrease her frequency to  one time a week. Will progress as tolerated.    Pt prognosis is Good. Pt will continue to benefit from skilled outpatient physical therapy to address the deficits listed in the problem list chart on initial evaluation, provide pt/family education and to maximize pt's level of independence in the home and community environment.     Anticipated barriers to physical therapy: none anticipated    Goals as follows:  Short Term goals: 4 weeks  1. Patient will demonstrate 100% understanding of lymphedema risk reduction practices to include self monitoring for lymphedema. (progressing, not met)  2. Patient will demonstrate independence with Home Exercise program established. (Met, 6/30/23)  3. Pt will increase AROM/PROM in shoulder external rotation ROM to 90 degrees bilaterally to improve functional reach, carry, push, pull pain free. (Met, 6/2/23)  4. Pt will increase AROM/PROM in shoulder flexion to 180 degrees on the left to improve functional reach, carry, push, pull pain free.(progressing, not met)  5. Pt will increase strength to >/= 4+/5 in gross UE musculature to improve tolerance to all functional activities pain free. (progressing, not met)     Long Term Goals: 8 weeks   1.  Pt will increase AROM/PROM in shoulder internal rotation to 90 degrees bilaterally to improve functional reach, carry, push, pull pain free. (Met, 6/2/23)  2. Pt will increase strength to 4+/5 in gross UE musculature to improve tolerance to all functional activities pain free. (progressing, not met)  3. Pt will demonstrate full/maximized tissue mobility to increase ROM and promote healthy tissue to be pain free at discharge. (progressing, not met)  4. Pt will report decrease in overall worst pain to 2/10 at discharge. (progressing, not met)  5. Pt will increase strength to 5/5 in gross UE musculature to improve tolerance to all functional activities pain free. (progressing, not met)  6. Patient will report compliance with walking program 5x  week for 30 min each day to improve overall cardiovascular function and decrease cancer related fatigue at discharge. (progressing, not met)        Plan   Outpatient physical therapy 2x week for 8 weeks to include the following:   Manual Therapy, Neuromuscular Re-ed, Patient Education, Self Care, Therapeutic Activities, Therapeutic Exercise, and IASTM . Dry needling with manual therapy techniques to decrease pain, inflammation and swelling, increase circulation and promote healing process.     Plan of care Certification Period: 5/26/2023 to 07/21/2023.    Therapist: Catherine Glover, PT  6/30/2023

## 2023-07-05 ENCOUNTER — CLINICAL SUPPORT (OUTPATIENT)
Dept: REHABILITATION | Facility: HOSPITAL | Age: 58
End: 2023-07-05
Payer: COMMERCIAL

## 2023-07-05 DIAGNOSIS — M25.611 DECREASED RANGE OF MOTION OF RIGHT SHOULDER: ICD-10-CM

## 2023-07-05 DIAGNOSIS — M25.612 DECREASED RANGE OF MOTION OF SHOULDER, LEFT: ICD-10-CM

## 2023-07-05 DIAGNOSIS — Z91.89 AT RISK FOR LYMPHEDEMA: ICD-10-CM

## 2023-07-05 DIAGNOSIS — R29.898 WEAKNESS OF BOTH UPPER EXTREMITIES: Primary | ICD-10-CM

## 2023-07-05 PROCEDURE — 97140 MANUAL THERAPY 1/> REGIONS: CPT

## 2023-07-05 PROCEDURE — 97110 THERAPEUTIC EXERCISES: CPT

## 2023-07-05 PROCEDURE — 97112 NEUROMUSCULAR REEDUCATION: CPT

## 2023-07-05 NOTE — PROGRESS NOTES
Physical Therapy Daily Treatment Note       Date: 7/5/2023   Name: Malika Curtis  Clinic Number: [id number]     Therapy Diagnosis:   Encounter Diagnoses   Name Primary?    Weakness of both upper extremities Yes    Decreased range of motion of shoulder, left     Decreased range of motion of right shoulder     At risk for lymphedema            Physician: Roxana Stahl MD    Physician Orders: PT Eval and Treat   Medical Diagnosis: Z90.13 (ICD-10-CM) - Status post bilateral mastectomy  Evaluation Date: 5/26/2023  Authorization Period Expiration: 12/29/23  Plan of Care Certification Period: 7/21/2023  Visit # / Visits authorized: 10/ 20 (plus evaluation)  Insurance: BLUE CROSS BLUE SHIELD  FOTO: 2/3     Time In: 3:35 pm  Time Out: 4:30 pm  Total Billable Time: 55 minutes     Precautions: Standard and cancer     Subjective     Pt reports: feeling good this afternoon. She is still some stiffness in her chest close to the center.  She was compliant with home exercise program.  Response to previous treatment: minimal soreness  Pain Scale: Malika rates pain on a scale of 1/10 on VAS.   Pain location: B pecs    Fatigue: better  Functional change: more confident with reaching up, able to get into the pool with no problem  Treatment:   Chemotherapy: N/A  Radiation: N/A  Endocrine therapy:  Arimidex     Surgery date: Mastectomy (Bilateral, 9/21/2022); [place] lymph node biopsy (Left, 9/21/2022); and Injection for sentinel node identification (Left, 9/21/2022).      Objective     Objective Measures updated at progress report unless specified.        Treatment     Malika received therapeutic exercises to develop strength, endurance, ROM, flexibility, posture and core stabilization for 15 minutes including:     UBE, seat 2, level 3, 2'/2'  LTR with hands behind head, 2'  Snow ivonne stretch, 2 towels, 3 reps x 30 sec - held  Open book, bilateral, 1 set x 10 reps  Shoulder ext,  green tube, 1 set x 10 reps  Shoulder ER, red tube, 1 set x 10 reps  Wall bounces, small green ball, 2 reps x 30 sec    Malika participated in neuromuscular re-education activities to improve: Balance, Coordination, Proprioception and Posture for 25 minutes. The following activities were included:  Pec stretch supine, 2 towel roll, 2 min  Scapular retractions around towel roll, 1 x 10 reps  B shoulder ER around towel roll, red band, 2 sets x 15 reps  Horizontal abd around towel roll, green band, 2 sets x 15 reps  Diagonals standing, red band, 1 set x 10 reps  Rows, green tube, 2 set x 10 reps    Malika received the following manual therapy techniques were performed to increased myofascial/soft tissue length, mobility and pliability, increase PROM, AROM and function as well as to decrease pain for 15 minutes  -Gentle STM and side-bending to  R pec  - STM to right and left lateral chest wall, axilla    Home Exercise Program and Patient Education   Education provided re:  - role of PT in multi - disciplinary team, goals for PT  - progress towards goals   - exercise technique, HEP updated    Written Home Exercises Provided: yes.  Exercises were reviewed and Malika was able to demonstrate them prior to the end of the session.  Malika demonstrated good  understanding of the education provided.     See EMR under Patient Instructions for exercises provided 5/30/2023 and Eval.    Pt has no cultural, educational or language barriers to learning provided.    Assessment     Patient is responding well to physical therapy. She was able to perform all of today's progressions and new exercises with no increase in symptoms prior to leaving the clinic. She continues to respond well to manual therapy and decreased tissue tension in her pecs at the end of the session. She endorsed adequate challenge with wall bounces to work on UE endurance. Will progress as tolerated.    Pt prognosis is Good. Pt will continue to benefit from  skilled outpatient physical therapy to address the deficits listed in the problem list chart on initial evaluation, provide pt/family education and to maximize pt's level of independence in the home and community environment.     Anticipated barriers to physical therapy: none anticipated    Goals as follows:  Short Term goals: 4 weeks  1. Patient will demonstrate 100% understanding of lymphedema risk reduction practices to include self monitoring for lymphedema. (progressing, not met)  2. Patient will demonstrate independence with Home Exercise program established. (Met, 6/30/23)  3. Pt will increase AROM/PROM in shoulder external rotation ROM to 90 degrees bilaterally to improve functional reach, carry, push, pull pain free. (Met, 6/2/23)  4. Pt will increase AROM/PROM in shoulder flexion to 180 degrees on the left to improve functional reach, carry, push, pull pain free.(progressing, not met)  5. Pt will increase strength to >/= 4+/5 in gross UE musculature to improve tolerance to all functional activities pain free. (progressing, not met)     Long Term Goals: 8 weeks   1.  Pt will increase AROM/PROM in shoulder internal rotation to 90 degrees bilaterally to improve functional reach, carry, push, pull pain free. (Met, 6/2/23)  2. Pt will increase strength to 4+/5 in gross UE musculature to improve tolerance to all functional activities pain free. (progressing, not met)  3. Pt will demonstrate full/maximized tissue mobility to increase ROM and promote healthy tissue to be pain free at discharge. (progressing, not met)  4. Pt will report decrease in overall worst pain to 2/10 at discharge. (progressing, not met)  5. Pt will increase strength to 5/5 in gross UE musculature to improve tolerance to all functional activities pain free. (progressing, not met)  6. Patient will report compliance with walking program 5x week for 30 min each day to improve overall cardiovascular function and decrease cancer related fatigue  at discharge. (progressing, not met)        Plan   Outpatient physical therapy 2x week for 8 weeks to include the following:   Manual Therapy, Neuromuscular Re-ed, Patient Education, Self Care, Therapeutic Activities, Therapeutic Exercise, and IASTM . Dry needling with manual therapy techniques to decrease pain, inflammation and swelling, increase circulation and promote healing process.     Plan of care Certification Period: 5/26/2023 to 07/21/2023.    Therapist: Catherine Glover, PT  7/5/2023

## 2023-07-11 ENCOUNTER — CLINICAL SUPPORT (OUTPATIENT)
Dept: REHABILITATION | Facility: HOSPITAL | Age: 58
End: 2023-07-11
Payer: COMMERCIAL

## 2023-07-11 DIAGNOSIS — R29.898 WEAKNESS OF BOTH UPPER EXTREMITIES: Primary | ICD-10-CM

## 2023-07-11 DIAGNOSIS — M25.611 DECREASED RANGE OF MOTION OF RIGHT SHOULDER: ICD-10-CM

## 2023-07-11 DIAGNOSIS — Z91.89 AT RISK FOR LYMPHEDEMA: ICD-10-CM

## 2023-07-11 DIAGNOSIS — M25.612 DECREASED RANGE OF MOTION OF SHOULDER, LEFT: ICD-10-CM

## 2023-07-11 PROCEDURE — 97140 MANUAL THERAPY 1/> REGIONS: CPT

## 2023-07-11 PROCEDURE — 97112 NEUROMUSCULAR REEDUCATION: CPT

## 2023-07-11 PROCEDURE — 97110 THERAPEUTIC EXERCISES: CPT

## 2023-07-11 NOTE — PROGRESS NOTES
Physical Therapy Daily Treatment Note       Date: 7/11/2023   Name: Malika Curtis  Clinic Number: [id number]     Therapy Diagnosis:   Encounter Diagnoses   Name Primary?    Weakness of both upper extremities Yes    Decreased range of motion of shoulder, left     Decreased range of motion of right shoulder     At risk for lymphedema      Physician: Roxana Stahl MD    Physician Orders: PT Eval and Treat   Medical Diagnosis: Z90.13 (ICD-10-CM) - Status post bilateral mastectomy  Evaluation Date: 5/26/2023  Authorization Period Expiration: 12/29/23  Plan of Care Certification Period: 7/21/2023  Visit # / Visits authorized: 10/ 20 (plus evaluation)  Insurance: BLUE CROSS BLUE SHIELD  FOTO: 2/3     Time In: 9:00 am  Time Out: 9:5 am  Total Billable Time: 55 minutes     Precautions: Standard and cancer     Subjective     Pt reports: waking up with her pits sore this morning. Does not recall any new activities, but she has not been using her reachers as much now as she has been focusing on stretching her arms more. She did put some aspercreme on her pits this morning and it seemed to help.  She was compliant with home exercise program.  Response to previous treatment: typical soreness  Pain Scale: Malika rates pain on a scale of 2/10 on VAS.   Pain location: B axilla    Fatigue: better  Functional change: more confident with reaching up, able to get into the pool with no problem  Treatment:   Chemotherapy: N/A  Radiation: N/A  Endocrine therapy:  Arimidex     Surgery date: Mastectomy (Bilateral, 9/21/2022); [place] lymph node biopsy (Left, 9/21/2022); and Injection for sentinel node identification (Left, 9/21/2022).      Objective     Objective Measures updated at progress report unless specified.        Treatment     Malika received therapeutic exercises to develop strength, endurance, ROM, flexibility, posture and core stabilization for 15 minutes including:      UBE, seat 2, level 3, 2'/2'  LTR with hands behind head, 2'  Snow ivonne stretch, 2 towels, 3 reps x 30 sec  Open book, bilateral, 1 set x 10 reps  Shoulder ext, green tube, 2 set x 10 reps  Shoulder ER, red tube, 1 set x 15 reps  Wall bounces, small green ball, 2 reps x 30 sec    Malika participated in neuromuscular re-education activities to improve: Balance, Coordination, Proprioception and Posture for 25 minutes. The following activities were included:  B shoulder ER around towel roll, green band, 2 sets x 10 reps  Horizontal abd around towel roll, green band, 2 sets x 10 reps  Diagonals standing, red band, 1 set x 10 reps  Rows, green tube, 2 set x 10 reps    Malika received the following manual therapy techniques were performed to increased myofascial/soft tissue length, mobility and pliability, increase PROM, AROM and function as well as to decrease pain for 15 minutes  -Gentle STM and side-bending to  R pec  - STM to right and left lateral chest wall, axilla  - axillary stretch bilaterally  - lateral chest wall stretch bilaterally    Home Exercise Program and Patient Education   Education provided re:  - role of PT in multi - disciplinary team, goals for PT  - progress towards goals   - exercise technique, HEP updated    Written Home Exercises Provided: yes.  Exercises were reviewed and Malika was able to demonstrate them prior to the end of the session.  Malika demonstrated good  understanding of the education provided.     See EMR under Patient Instructions for exercises provided 5/30/2023 and Eval.    Pt has no cultural, educational or language barriers to learning provided.    Assessment     Patient is responding well to physical therapy. She was able to perform all of today's activities with no increase in symptoms prior to leaving the clinic. She had some increased tissue tension in her right pecs and lats at the start of the session. She responded well to manual therapy techniques as she  "reported less discomfort in her "pits" and chest area. She required occasional cues for technique with her resistance band exercises. Will progress as tolerated.    Pt prognosis is Good. Pt will continue to benefit from skilled outpatient physical therapy to address the deficits listed in the problem list chart on initial evaluation, provide pt/family education and to maximize pt's level of independence in the home and community environment.     Anticipated barriers to physical therapy: none anticipated    Goals as follows:  Short Term goals: 4 weeks  1. Patient will demonstrate 100% understanding of lymphedema risk reduction practices to include self monitoring for lymphedema. (progressing, not met)  2. Patient will demonstrate independence with Home Exercise program established. (Met, 6/30/23)  3. Pt will increase AROM/PROM in shoulder external rotation ROM to 90 degrees bilaterally to improve functional reach, carry, push, pull pain free. (Met, 6/2/23)  4. Pt will increase AROM/PROM in shoulder flexion to 180 degrees on the left to improve functional reach, carry, push, pull pain free.(progressing, not met)  5. Pt will increase strength to >/= 4+/5 in gross UE musculature to improve tolerance to all functional activities pain free. (progressing, not met)     Long Term Goals: 8 weeks   1.  Pt will increase AROM/PROM in shoulder internal rotation to 90 degrees bilaterally to improve functional reach, carry, push, pull pain free. (Met, 6/2/23)  2. Pt will increase strength to 4+/5 in gross UE musculature to improve tolerance to all functional activities pain free. (progressing, not met)  3. Pt will demonstrate full/maximized tissue mobility to increase ROM and promote healthy tissue to be pain free at discharge. (progressing, not met)  4. Pt will report decrease in overall worst pain to 2/10 at discharge. (progressing, not met)  5. Pt will increase strength to 5/5 in gross UE musculature to improve tolerance to all " functional activities pain free. (progressing, not met)  6. Patient will report compliance with walking program 5x week for 30 min each day to improve overall cardiovascular function and decrease cancer related fatigue at discharge. (progressing, not met)        Plan   Outpatient physical therapy 2x week for 8 weeks to include the following:   Manual Therapy, Neuromuscular Re-ed, Patient Education, Self Care, Therapeutic Activities, Therapeutic Exercise, and IASTM . Dry needling with manual therapy techniques to decrease pain, inflammation and swelling, increase circulation and promote healing process.     Plan of care Certification Period: 5/26/2023 to 07/21/2023.    Therapist: Catherine Glover, PT  7/11/2023

## 2023-07-13 DIAGNOSIS — Z79.811 PROPHYLACTIC USE OF ANASTROZOLE (ARIMIDEX): ICD-10-CM

## 2023-07-13 RX ORDER — ANASTROZOLE 1 MG/1
TABLET ORAL
Qty: 90 TABLET | Refills: 0 | Status: SHIPPED | OUTPATIENT
Start: 2023-07-13 | End: 2023-10-16

## 2023-07-25 ENCOUNTER — CLINICAL SUPPORT (OUTPATIENT)
Dept: REHABILITATION | Facility: HOSPITAL | Age: 58
End: 2023-07-25
Payer: COMMERCIAL

## 2023-07-25 DIAGNOSIS — Z91.89 AT RISK FOR LYMPHEDEMA: ICD-10-CM

## 2023-07-25 DIAGNOSIS — M25.612 DECREASED RANGE OF MOTION OF SHOULDER, LEFT: ICD-10-CM

## 2023-07-25 DIAGNOSIS — R29.898 WEAKNESS OF BOTH UPPER EXTREMITIES: Primary | ICD-10-CM

## 2023-07-25 DIAGNOSIS — M25.611 DECREASED RANGE OF MOTION OF RIGHT SHOULDER: ICD-10-CM

## 2023-07-25 PROCEDURE — 97750 PHYSICAL PERFORMANCE TEST: CPT

## 2023-07-25 PROCEDURE — 97140 MANUAL THERAPY 1/> REGIONS: CPT

## 2023-07-25 PROCEDURE — 97110 THERAPEUTIC EXERCISES: CPT

## 2023-07-25 NOTE — PROGRESS NOTES
Physical Therapy Daily Treatment Note       Date: 7/25/2023   Name: Malika Curtis  Clinic Number: [id number]     Therapy Diagnosis:   Encounter Diagnoses   Name Primary?    Weakness of both upper extremities Yes    Decreased range of motion of shoulder, left     Decreased range of motion of right shoulder     At risk for lymphedema        Physician: Roxana Stahl MD    Physician Orders: PT Eval and Treat   Medical Diagnosis: Z90.13 (ICD-10-CM) - Status post bilateral mastectomy  Evaluation Date: 5/26/2023  Authorization Period Expiration: 12/29/23  Plan of Care Certification Period: 7/21/2023  Visit # / Visits authorized: 12/ 20 (plus evaluation)  Insurance: BLUE CROSS BLUE SHIELD  FOTO: 2/3     Time In: 9:00 am  Time Out: 10:00 am  Total Billable Time: 60 minutes     Precautions: Standard and cancer     Subjective     Pt reports: some soreness under her left breast area after doing heavy housework(scrubbing, mopping) last week.  She was compliant with home exercise program.  Response to previous treatment: felt good  Pain Scale: Malika rates pain on a scale of 2/10 on VAS.   Pain location: left ribs    Fatigue: probably ok  Functional change: more confident with reaching up, able to get into the pool with no problem  Treatment:   Chemotherapy: N/A  Radiation: N/A  Endocrine therapy:  Arimidex     Surgery date: Mastectomy (Bilateral, 9/21/2022); [place] lymph node biopsy (Left, 9/21/2022); and Injection for sentinel node identification (Left, 9/21/2022).      Objective     Objective Measures updated at progress report unless specified.     Shoulder Range of Motion:   Active /Passive ROM Right Left   Flexion 175 175   Abduction 180 180   Extension 50 50   IR/90deg 90 90   ER/90deg 95 90           Strength: manual muscle test grades below   Upper Extremity Strength    (R) UE (L) UE   Shoulder flexion: 5/5 5/5   Shoulder Abduction: 5/5 5/5   Shoulder IR 5/5 5/5    Shoulder ER 5/5 5/5   Elbow flexion: 5/5 5/5   Elbow extension: 5/5 5/5   Wrist flexion: 5/5 5/5   Wrist extension: 5/5 5/5    42.5 lbs 41.4 lbs     CMS Impairment/Limitation/Restriction for FOTO Shoulder Survey     Therapist reviewed FOTO scores for Malika Curtis on 7/21/2023.   FOTO documents entered into [facility] - see Media section.     Limitations Score: 38%  Category: Carrying  Treatment     Malika received a physical performance test with report for 20 minutes:  See objective section for measurements    Malika received therapeutic exercises to develop strength, endurance, ROM, flexibility, posture and core stabilization for 15 minutes including:     UBE, seat 2, level 3, 2'/2'  LTR with hands behind head, 2'  Snow ivonne stretch, 2 towels, 3 reps x 30 sec  Foam rolling to lats, laquita, 1' each    Malika received the following manual therapy techniques were performed to increased myofascial/soft tissue length, mobility and pliability, increase PROM, AROM and function as well as to decrease pain for 25 minutes  -Gentle STM and side-bending to  R pec  - STM to right and left lateral chest wall, axilla  - axillary stretch bilaterally  - lateral chest wall stretch bilaterally    Home Exercise Program and Patient Education   Education provided re:  - role of PT in multi - disciplinary team, goals for PT  - progress towards goals   - exercise technique, HEP updated    Written Home Exercises Provided: yes.  Exercises were reviewed and Malika was able to demonstrate them prior to the end of the session.  Malika demonstrated good  understanding of the education provided.     See EMR under Patient Instructions for exercises provided 5/30/2023 and Eval.    Pt has no cultural, educational or language barriers to learning provided.    Assessment     Patient was able to demonstrate AROM of both shoulder WNL and painfree. She was also able to demonstrate B UE strength of 5/5 with no complaints of pain. She has met  all goals set for her at this time. She is independent with her HEP. She is ready for discharge to her HEP at this time.     Anticipated barriers to physical therapy: none anticipated    Goals as follows:  Short Term goals: 4 weeks  1. Patient will demonstrate 100% understanding of lymphedema risk reduction practices to include self monitoring for lymphedema. (Met, 7/25/23)  2. Patient will demonstrate independence with Home Exercise program established. (Met, 6/30/23)  3. Pt will increase AROM/PROM in shoulder external rotation ROM to 90 degrees bilaterally to improve functional reach, carry, push, pull pain free. (Met, 6/2/23)  4. Pt will increase AROM/PROM in shoulder flexion to 180 degrees on the left to improve functional reach, carry, push, pull pain free.(partially met bilaterally 175 degrees, 7/25/23)  5. Pt will increase strength to >/= 4+/5 in gross UE musculature to improve tolerance to all functional activities pain free. (Met, 7/25/23)     Long Term Goals: 8 weeks   1.  Pt will increase AROM/PROM in shoulder internal rotation to 90 degrees bilaterally to improve functional reach, carry, push, pull pain free. (Met, 6/2/23)  2. Pt will increase strength to 4+/5 in gross UE musculature to improve tolerance to all functional activities pain free. (Met, 7/25/23)  3. Pt will demonstrate full/maximized tissue mobility to increase ROM and promote healthy tissue to be pain free at discharge. (Met, 7/25/23)  4. Pt will report decrease in overall worst pain to 2/10 at discharge. (Met, 7/25/23)  5. Pt will increase strength to 5/5 in gross UE musculature to improve tolerance to all functional activities pain free. (Met, 7/25/23)  6. Patient will report compliance with walking program 5x week for 30 min each day to improve overall cardiovascular function and decrease cancer related fatigue at discharge. (Met, 7/25/23)        Plan   Discharge to [facility].    Therapist: Catherine Glover, PT  7/25/2023

## 2023-07-26 PROBLEM — M25.612 DECREASED RANGE OF MOTION OF SHOULDER, LEFT: Status: RESOLVED | Noted: 2023-05-26 | Resolved: 2023-07-26

## 2023-07-26 PROBLEM — Z91.89 AT RISK FOR LYMPHEDEMA: Status: RESOLVED | Noted: 2023-05-26 | Resolved: 2023-07-26

## 2023-07-26 PROBLEM — R29.898 WEAKNESS OF BOTH UPPER EXTREMITIES: Status: RESOLVED | Noted: 2023-05-26 | Resolved: 2023-07-26

## 2023-07-26 PROBLEM — M25.611 DECREASED RANGE OF MOTION OF RIGHT SHOULDER: Status: RESOLVED | Noted: 2023-05-26 | Resolved: 2023-07-26

## 2023-07-26 NOTE — PLAN OF CARE
[facility]  Physical Therapy Discharge Note    Name: Malika Curtis  Clinic Number: [id number]    Therapy Diagnosis:   Encounter Diagnoses   Name Primary?    Weakness of both upper extremities Yes    Decreased range of motion of shoulder, left     Decreased range of motion of right shoulder     At risk for lymphedema      Physician: Roxana Stahl MD    Physician Orders: PT Eval and Treat   Medical Diagnosis: Z90.13 (ICD-10-CM) - Status post bilateral mastectomy  Evaluation Date: 5/26/2023      Date of Last visit: 7/25/2023  Total Visits Received: 13    ASSESSMENT      Pt reports: some soreness under her left breast area after doing heavy housework(scrubbing, mopping) last week.  She was compliant with home exercise program.  Response to previous treatment: felt good  Pain Scale: Malika rates pain on a scale of 2/10 on VAS.   Pain location: left ribs     Fatigue: probably ok  Functional change: more confident with reaching up, able to get into the pool with no problem    Discharge reason: Patient has met all of his/her goals    Discharge FOTO Score: 38% impaired, limited, or restrcited    Goals:   Short Term goals: 4 weeks  1. Patient will demonstrate 100% understanding of lymphedema risk reduction practices to include self monitoring for lymphedema. (Met, 7/25/23)  2. Patient will demonstrate independence with Home Exercise program established. (Met, 6/30/23)  3. Pt will increase AROM/PROM in shoulder external rotation ROM to 90 degrees bilaterally to improve functional reach, carry, push, pull pain free. (Met, 6/2/23)  4. Pt will increase AROM/PROM in shoulder flexion to 180 degrees on the left to improve functional reach, carry, push, pull pain free.(partially met bilaterally 175 degrees, 7/25/23)  5. Pt will increase strength to >/= 4+/5 in gross UE musculature to improve tolerance to all functional activities pain free. (Met, 7/25/23)     Long Term Goals: 8 weeks   1.  Pt will  increase AROM/PROM in shoulder internal rotation to 90 degrees bilaterally to improve functional reach, carry, push, pull pain free. (Met, 6/2/23)  2. Pt will increase strength to 4+/5 in gross UE musculature to improve tolerance to all functional activities pain free. (Met, 7/25/23)  3. Pt will demonstrate full/maximized tissue mobility to increase ROM and promote healthy tissue to be pain free at discharge. (Met, 7/25/23)  4. Pt will report decrease in overall worst pain to 2/10 at discharge. (Met, 7/25/23)  5. Pt will increase strength to 5/5 in gross UE musculature to improve tolerance to all functional activities pain free. (Met, 7/25/23)  6. Patient will report compliance with walking program 5x week for 30 min each day to improve overall cardiovascular function and decrease cancer related fatigue at discharge. (Met, 7/25/23)    PLAN   This patient is discharged from Physical Therapy      Catherine Glover, PT

## 2023-09-06 ENCOUNTER — OFFICE VISIT (OUTPATIENT)
Dept: HEMATOLOGY/ONCOLOGY | Facility: CLINIC | Age: 58
End: 2023-09-06
Payer: COMMERCIAL

## 2023-09-06 VITALS
HEIGHT: 61 IN | HEART RATE: 102 BPM | TEMPERATURE: 97 F | BODY MASS INDEX: 27.52 KG/M2 | WEIGHT: 145.75 LBS | OXYGEN SATURATION: 97 % | DIASTOLIC BLOOD PRESSURE: 68 MMHG | RESPIRATION RATE: 18 BRPM | SYSTOLIC BLOOD PRESSURE: 126 MMHG

## 2023-09-06 DIAGNOSIS — Z17.0 CARCINOMA OF AXILLARY TAIL OF LEFT BREAST IN FEMALE, ESTROGEN RECEPTOR POSITIVE: Primary | ICD-10-CM

## 2023-09-06 DIAGNOSIS — C50.612 CARCINOMA OF AXILLARY TAIL OF LEFT BREAST IN FEMALE, ESTROGEN RECEPTOR POSITIVE: Primary | ICD-10-CM

## 2023-09-06 DIAGNOSIS — Z79.811 PROPHYLACTIC USE OF ANASTROZOLE (ARIMIDEX): ICD-10-CM

## 2023-09-06 PROCEDURE — 99213 OFFICE O/P EST LOW 20 MIN: CPT | Mod: S$GLB,,, | Performed by: INTERNAL MEDICINE

## 2023-09-06 PROCEDURE — 1159F MED LIST DOCD IN RCRD: CPT | Mod: CPTII,S$GLB,, | Performed by: INTERNAL MEDICINE

## 2023-09-06 PROCEDURE — 3008F BODY MASS INDEX DOCD: CPT | Mod: CPTII,S$GLB,, | Performed by: INTERNAL MEDICINE

## 2023-09-06 PROCEDURE — 1159F PR MEDICATION LIST DOCUMENTED IN MEDICAL RECORD: ICD-10-PCS | Mod: CPTII,S$GLB,, | Performed by: INTERNAL MEDICINE

## 2023-09-06 PROCEDURE — 99213 PR OFFICE/OUTPT VISIT, EST, LEVL III, 20-29 MIN: ICD-10-PCS | Mod: S$GLB,,, | Performed by: INTERNAL MEDICINE

## 2023-09-06 PROCEDURE — 99999 PR PBB SHADOW E&M-EST. PATIENT-LVL IV: ICD-10-PCS | Mod: PBBFAC,,, | Performed by: INTERNAL MEDICINE

## 2023-09-06 PROCEDURE — 1160F RVW MEDS BY RX/DR IN RCRD: CPT | Mod: CPTII,S$GLB,, | Performed by: INTERNAL MEDICINE

## 2023-09-06 PROCEDURE — 1160F PR REVIEW ALL MEDS BY PRESCRIBER/CLIN PHARMACIST DOCUMENTED: ICD-10-PCS | Mod: CPTII,S$GLB,, | Performed by: INTERNAL MEDICINE

## 2023-09-06 PROCEDURE — 99999 PR PBB SHADOW E&M-EST. PATIENT-LVL IV: CPT | Mod: PBBFAC,,, | Performed by: INTERNAL MEDICINE

## 2023-09-06 PROCEDURE — 3078F PR MOST RECENT DIASTOLIC BLOOD PRESSURE < 80 MM HG: ICD-10-PCS | Mod: CPTII,S$GLB,, | Performed by: INTERNAL MEDICINE

## 2023-09-06 PROCEDURE — 3078F DIAST BP <80 MM HG: CPT | Mod: CPTII,S$GLB,, | Performed by: INTERNAL MEDICINE

## 2023-09-06 PROCEDURE — 3008F PR BODY MASS INDEX (BMI) DOCUMENTED: ICD-10-PCS | Mod: CPTII,S$GLB,, | Performed by: INTERNAL MEDICINE

## 2023-09-06 PROCEDURE — 3074F SYST BP LT 130 MM HG: CPT | Mod: CPTII,S$GLB,, | Performed by: INTERNAL MEDICINE

## 2023-09-06 PROCEDURE — 3074F PR MOST RECENT SYSTOLIC BLOOD PRESSURE < 130 MM HG: ICD-10-PCS | Mod: CPTII,S$GLB,, | Performed by: INTERNAL MEDICINE

## 2023-09-06 NOTE — PROGRESS NOTES
Subjective:       Patient ID: Malika Curtis is a 58 y.o. female.    Chief Complaint: No chief complaint on file.      HPI     Mrs. Curtis returns today for follow-up  I had last seen her in early March 2023.  As of November 1, 2022 she is on Arimidex in the adjuvant setting, and she has tolerated it well so far without any side effects.  Her DXA scan shows normal bone density.    Of note, she had undergone bilateral mastectomies with reconstruction on September 21, 2022.  The pathology report indicates that she had a 30 mm left-sided IDCA.  Resection margins were clear while the sentinel lymph node was negative.  There was no invasive or in situ cancer identified in the right mastectomy specimen.  Her Oncotype score is 19 indicating a 94% chance of disease-free survival at 10 years with hormonal therapy alone.  The benefit of chemotherapy was estimated at less than 1%.    Briefly, she is a 58-year-old female with a recent diagnosis of breast cancer referred for evaluation.  Apparently she had an abnormal mammogram on 05/23/2022 that showed a focal asymmetry at the upper outer anterior position of the left breast.  A diagnostic mammogram and ultrasound on 06/22/2022 showed a 6.6 cm area of calcifications.  The ultrasound showed an irregular hypoechoic mass with angular and indistinct margins measuring 1.5 cm.    A biopsy was performed on 08/08/2022.  The area of calcifications showed DCIS, low grade that was ER positive and MS positive.  The mass at the 2 o'clock position showed a grade 1 invasive ductal carcinoma measuring at least 9 mm which was ER strongly positive, MS strongly positive, HER2 negative with a Ki-67 of 8%       ROS:  Overall she feels well and she has no complaints.  Her ECOG PS is 0.  So far she has tolerated the anastrozole well so far and her only complaint is minor hot flashes.  She has not experienced any musculoskeletal side effects.  She denies any depression, easy bruising, fevers, chills,  night  sweats, weight loss, nausea, vomiting, diarrhea, constipation, diplopia, blurred vision, headache, palpitations, shortness of breath, abdominal pain, extremity pain, or difficulty ambulating.  The remainder of the ten-point ROS, including general, skin, lymph, H/N, cardiorespiratory, GI, , Neuro, Endocrine, and psychiatric is negative.     Objective:      Physical Exam      She is alert, oriented to time, place, person, pleasant, well      nourished, in no acute physical distress.                                   VITAL SIGNS:  Reviewed                                      HEENT:  Normal.  There are no nasal, oral, lip, gingival, auricular, lid,    or conjunctival lesions.  Mucosae are moist and pink, and there is no        thrush.  Pupils are equal, reactive to light and accommodation.              Extraocular muscle movements are intact.  Dentition is good.  There is no frontal or maxillary tenderness.                                     NECK:  Supple without JVD, adenopathy, or thyromegaly.                       LUNGS:  Clear to auscultation without wheezing, rales, or rhonchi.           CARDIOVASCULAR:  Reveals an S1, S2, no murmurs, no rubs, no gallops.         ABDOMEN:  Soft, nontender, without organomegaly.  Bowel sounds are present.                                                                     EXTREMITIES:  No cyanosis, clubbing, or edema.                               BREASTS:   She is status post bilateral mastectomies.  Her incisions have healed nicely and there is no evidence of recurrence                                 LYMPHATIC:  There is no cervical, axillary, or supraclavicular adenopathy.   SKIN:  Warm and moist, without petechiae, rashes, induration, or ecchymoses.           NEUROLOGIC:  DTRs are 0-1+ bilaterally, symmetrical, motor function is 5/5,  and cranial nerves are  within normal limits.    Assessment:       1.  Grade 2 IDCA, left breast, T2 N0 M0 ER strongly positive, TN  positive, and HER2 negative, now status post bilateral mastectomies       2. Adjuvant use of anastrozole  Plan:         I would a long discussion with Mrs. Curtis.  She will remain on anastrozole which she will take through the end of October 2027.  I will see her again in 3 months.      Her multiple questions were answered to her satisfaction.  I spent approximately 20 minutes reviewing the available records and evaluating the patient.          Route Chart for Scheduling    Med Onc Chart Routing      Follow up with physician 3 months.   Follow up with ANDREA    Infusion scheduling note    Injection scheduling note    Labs    Imaging    Pharmacy appointment    Other referrals

## 2023-10-14 DIAGNOSIS — Z79.811 PROPHYLACTIC USE OF ANASTROZOLE (ARIMIDEX): ICD-10-CM

## 2023-10-16 RX ORDER — ANASTROZOLE 1 MG/1
TABLET ORAL
Qty: 90 TABLET | Refills: 0 | Status: SHIPPED | OUTPATIENT
Start: 2023-10-16 | End: 2024-02-26

## 2023-12-06 ENCOUNTER — OFFICE VISIT (OUTPATIENT)
Dept: HEMATOLOGY/ONCOLOGY | Facility: CLINIC | Age: 58
End: 2023-12-06
Payer: COMMERCIAL

## 2023-12-06 VITALS
BODY MASS INDEX: 27.88 KG/M2 | HEIGHT: 61 IN | RESPIRATION RATE: 18 BRPM | DIASTOLIC BLOOD PRESSURE: 78 MMHG | TEMPERATURE: 98 F | HEART RATE: 78 BPM | WEIGHT: 147.69 LBS | OXYGEN SATURATION: 100 % | SYSTOLIC BLOOD PRESSURE: 118 MMHG

## 2023-12-06 DIAGNOSIS — Z79.811 PROPHYLACTIC USE OF ANASTROZOLE (ARIMIDEX): ICD-10-CM

## 2023-12-06 DIAGNOSIS — C50.412 MALIGNANT NEOPLASM OF UPPER-OUTER QUADRANT OF LEFT BREAST IN FEMALE, ESTROGEN RECEPTOR POSITIVE: Primary | ICD-10-CM

## 2023-12-06 DIAGNOSIS — D05.12 DUCTAL CARCINOMA IN SITU (DCIS) OF LEFT BREAST: ICD-10-CM

## 2023-12-06 DIAGNOSIS — Z17.0 MALIGNANT NEOPLASM OF UPPER-OUTER QUADRANT OF LEFT BREAST IN FEMALE, ESTROGEN RECEPTOR POSITIVE: Primary | ICD-10-CM

## 2023-12-06 PROCEDURE — 3074F PR MOST RECENT SYSTOLIC BLOOD PRESSURE < 130 MM HG: ICD-10-PCS | Mod: CPTII,S$GLB,, | Performed by: INTERNAL MEDICINE

## 2023-12-06 PROCEDURE — 1159F PR MEDICATION LIST DOCUMENTED IN MEDICAL RECORD: ICD-10-PCS | Mod: CPTII,S$GLB,, | Performed by: INTERNAL MEDICINE

## 2023-12-06 PROCEDURE — 99213 OFFICE O/P EST LOW 20 MIN: CPT | Mod: S$GLB,,, | Performed by: INTERNAL MEDICINE

## 2023-12-06 PROCEDURE — 3078F DIAST BP <80 MM HG: CPT | Mod: CPTII,S$GLB,, | Performed by: INTERNAL MEDICINE

## 2023-12-06 PROCEDURE — 3008F BODY MASS INDEX DOCD: CPT | Mod: CPTII,S$GLB,, | Performed by: INTERNAL MEDICINE

## 2023-12-06 PROCEDURE — 3074F SYST BP LT 130 MM HG: CPT | Mod: CPTII,S$GLB,, | Performed by: INTERNAL MEDICINE

## 2023-12-06 PROCEDURE — 1160F PR REVIEW ALL MEDS BY PRESCRIBER/CLIN PHARMACIST DOCUMENTED: ICD-10-PCS | Mod: CPTII,S$GLB,, | Performed by: INTERNAL MEDICINE

## 2023-12-06 PROCEDURE — 99213 PR OFFICE/OUTPT VISIT, EST, LEVL III, 20-29 MIN: ICD-10-PCS | Mod: S$GLB,,, | Performed by: INTERNAL MEDICINE

## 2023-12-06 PROCEDURE — 1159F MED LIST DOCD IN RCRD: CPT | Mod: CPTII,S$GLB,, | Performed by: INTERNAL MEDICINE

## 2023-12-06 PROCEDURE — 99999 PR PBB SHADOW E&M-EST. PATIENT-LVL IV: CPT | Mod: PBBFAC,,, | Performed by: INTERNAL MEDICINE

## 2023-12-06 PROCEDURE — 99999 PR PBB SHADOW E&M-EST. PATIENT-LVL IV: ICD-10-PCS | Mod: PBBFAC,,, | Performed by: INTERNAL MEDICINE

## 2023-12-06 PROCEDURE — 1160F RVW MEDS BY RX/DR IN RCRD: CPT | Mod: CPTII,S$GLB,, | Performed by: INTERNAL MEDICINE

## 2023-12-06 PROCEDURE — 3008F PR BODY MASS INDEX (BMI) DOCUMENTED: ICD-10-PCS | Mod: CPTII,S$GLB,, | Performed by: INTERNAL MEDICINE

## 2023-12-06 PROCEDURE — 3078F PR MOST RECENT DIASTOLIC BLOOD PRESSURE < 80 MM HG: ICD-10-PCS | Mod: CPTII,S$GLB,, | Performed by: INTERNAL MEDICINE

## 2023-12-06 NOTE — PROGRESS NOTES
Subjective:       Patient ID: Malika Curtis is a 58 y.o. female.    Chief Complaint: No chief complaint on file.      HPI     Mrs. Curtis returns today for follow-up  I had last seen her on September 6th, 2023.  As of November 1, 2022 she is on Arimidex in the adjuvant setting, and she has tolerated it well so far without any side effects.  Her DXA scan shows normal bone density.    Of note, she had undergone bilateral mastectomies with reconstruction on September 21, 2022.  The pathology report indicates that she had a 30 mm left-sided IDCA.  Resection margins were clear while the sentinel lymph node was negative.  There was no invasive or in situ cancer identified in the right mastectomy specimen.  Her Oncotype score is 19 indicating a 94% chance of disease-free survival at 10 years with hormonal therapy alone.  The benefit of chemotherapy was estimated at less than 1%.    Briefly, she is a 58-year-old female with a recent diagnosis of breast cancer referred for evaluation.  Apparently she had an abnormal mammogram on 05/23/2022 that showed a focal asymmetry at the upper outer anterior position of the left breast.  A diagnostic mammogram and ultrasound on 06/22/2022 showed a 6.6 cm area of calcifications.  The ultrasound showed an irregular hypoechoic mass with angular and indistinct margins measuring 1.5 cm.    A biopsy was performed on 08/08/2022.  The area of calcifications showed DCIS, low grade that was ER positive and AZ positive.  The mass at the 2 o'clock position showed a grade 1 invasive ductal carcinoma measuring at least 9 mm which was ER strongly positive, AZ strongly positive, HER2 negative with a Ki-67 of 8%       ROS:  Overall she feels well and she has no complaints.  Her ECOG PS is 0.  So far she has tolerated the anastrozole well so far and her only complaint is minor hot flashes.  She has not experienced any musculoskeletal side effects.  She denies any depression, easy bruising, fevers,  chills, night  sweats, weight loss, nausea, vomiting, diarrhea, constipation, diplopia, blurred vision, headache, palpitations, shortness of breath, abdominal pain, extremity pain, or difficulty ambulating.  The remainder of the ten-point ROS, including general, skin, lymph, H/N, cardiorespiratory, GI, , Neuro, Endocrine, and psychiatric is negative.     Objective:      Physical Exam      She is alert, oriented to time, place, person, pleasant, well      nourished, in no acute physical distress.                                   VITAL SIGNS:  Reviewed                                      HEENT:  Normal.  There are no nasal, oral, lip, gingival, auricular, lid,    or conjunctival lesions.  Mucosae are moist and pink, and there is no        thrush.  Pupils are equal, reactive to light and accommodation.              Extraocular muscle movements are intact.  Dentition is good.  There is no frontal or maxillary tenderness.                                     NECK:  Supple without JVD, adenopathy, or thyromegaly.                       LUNGS:  Clear to auscultation without wheezing, rales, or rhonchi.           CARDIOVASCULAR:  Reveals an S1, S2, no murmurs, no rubs, no gallops.         ABDOMEN:  Soft, nontender, without organomegaly.  Bowel sounds are present.                                                                     EXTREMITIES:  No cyanosis, clubbing, or edema.                               BREASTS:   She is status post bilateral mastectomies.  Her incisions have healed nicely and there is no evidence of recurrence                                 LYMPHATIC:  There is no cervical, axillary, or supraclavicular adenopathy.   SKIN:  Warm and moist, without petechiae, rashes, induration, or ecchymoses.           NEUROLOGIC:  DTRs are 0-1+ bilaterally, symmetrical, motor function is 5/5,  and cranial nerves are  within normal limits.    Assessment:       1.  Grade 2 IDCA, left breast, T2 N0 M0 ER strongly  positive, OK positive, and HER2 negative, now status post bilateral mastectomies       2. Adjuvant use of anastrozole  Plan:         I had a long discussion with Mrs. Curtis.  She will remain on anastrozole which she will take through the end of October 2027, at which point there will be a discussion for possible extension to seven years.  I will see her again in 4 months.    Her multiple questions were answered to her satisfaction.  I spent approximately 20 minutes reviewing the available records and evaluating the patient.          Route Chart for Scheduling    Med Onc Chart Routing      Follow up with physician 4 months.   Follow up with ANDREA    Infusion scheduling note    Injection scheduling note    Labs    Imaging    Pharmacy appointment    Other referrals

## 2024-02-13 NOTE — PROGRESS NOTES
Genetics Lay Navigator Note:    Met with patient at her consult with Dr. Stahl 8/23/2022, to facilitate genetic testing. Set patient up with [facility] genetic counselor over the phone to complete counseling prior to testing. Patient verbalized understanding to all counseling information. [facility] brochure with number to call with questions or concerns provided to patient as well as my card. Encouraged patient to call me or [facility] at any time.     Lab appointment made and patient escorted with [facility] kit to lab for specimen draw and processing. Patient instructed that results will be provided as soon as they are available. No questions or concerns from patient about plan of care.     [facility] TRF scanned in media  [facility] Pedigree attached to documentation note dated 8/24/2022    [employer] Tracking # [id number]   PERTINENT HPI: ***    Visit Vitals  /64 (BP Location: RUE - Right upper extremity, Patient Position: Sitting, Cuff Size: Large Adult)   Pulse 74   Temp 98.4 °F (36.9 °C) (Tympanic)   Ht 5' 2.5\" (1.588 m)   Wt 91.2 kg (201 lb 1.6 oz)   SpO2 97%   BMI 36.20 kg/m²       Physical Exam  Vitals and nursing note reviewed.   Constitutional:       General: He is not in acute distress.     Appearance: Normal appearance. He is obese. He is not ill-appearing, toxic-appearing or diaphoretic.   HENT:      Head: Normocephalic and atraumatic.      Right Ear: External ear normal.      Left Ear: External ear normal.      Nose: Nose normal.      Mouth/Throat:      Mouth: Mucous membranes are moist.   Eyes:      Extraocular Movements: Extraocular movements intact.      Conjunctiva/sclera: Conjunctivae normal.      Pupils: Pupils are equal, round, and reactive to light.   Cardiovascular:      Rate and Rhythm: Normal rate and regular rhythm.      Pulses: Normal pulses.      Heart sounds: Normal heart sounds. No murmur heard.     No friction rub. No gallop.   Pulmonary:      Effort: Pulmonary effort is normal. No respiratory distress.      Breath sounds: Normal breath sounds. No stridor. No wheezing or rales.   Abdominal:      General: Abdomen is flat. There is no distension.      Palpations: Abdomen is soft.   Musculoskeletal:         General: No tenderness.      Cervical back: Normal range of motion and neck supple.      Right lower leg: No edema.      Left lower leg: No edema.        Feet:    Feet:      Comments: 7 plantar warts affecting area marked in diagram.   Skin:     General: Skin is warm and dry.      Capillary Refill: Capillary refill takes less than 2 seconds.   Neurological:      General: No focal deficit present.      Mental Status: He is alert and oriented to person, place, and time. Mental status is at baseline.   Psychiatric:         Mood and Affect: Mood normal.         Behavior: Behavior normal.         Thought  Content: Thought content normal.         Judgment: Judgment normal.         Diagnoses and associated orders for this visit:  1. Plantar warts  -     SERVICE TO DERMATOLOGY      Return for F/U AS SCHEDULED 6/2024.    I spent a total of 20 minutes on the day of the visit.  This includes pre-charting, chart review, and documenting.

## 2024-02-26 DIAGNOSIS — Z79.811 PROPHYLACTIC USE OF ANASTROZOLE (ARIMIDEX): ICD-10-CM

## 2024-02-26 RX ORDER — ANASTROZOLE 1 MG/1
TABLET ORAL
Qty: 90 TABLET | Refills: 0 | Status: SHIPPED | OUTPATIENT
Start: 2024-02-26 | End: 2024-05-29

## 2024-04-03 ENCOUNTER — OFFICE VISIT (OUTPATIENT)
Dept: HEMATOLOGY/ONCOLOGY | Facility: CLINIC | Age: 59
End: 2024-04-03
Payer: COMMERCIAL

## 2024-04-03 VITALS
DIASTOLIC BLOOD PRESSURE: 67 MMHG | SYSTOLIC BLOOD PRESSURE: 115 MMHG | HEART RATE: 81 BPM | TEMPERATURE: 98 F | RESPIRATION RATE: 14 BRPM | OXYGEN SATURATION: 97 % | BODY MASS INDEX: 27.66 KG/M2 | WEIGHT: 146.38 LBS

## 2024-04-03 DIAGNOSIS — Z79.811 PROPHYLACTIC USE OF ANASTROZOLE (ARIMIDEX): ICD-10-CM

## 2024-04-03 DIAGNOSIS — C50.612 CARCINOMA OF AXILLARY TAIL OF LEFT BREAST IN FEMALE, ESTROGEN RECEPTOR POSITIVE: ICD-10-CM

## 2024-04-03 DIAGNOSIS — C50.412 MALIGNANT NEOPLASM OF UPPER-OUTER QUADRANT OF LEFT BREAST IN FEMALE, ESTROGEN RECEPTOR POSITIVE: Primary | ICD-10-CM

## 2024-04-03 DIAGNOSIS — Z17.0 MALIGNANT NEOPLASM OF UPPER-OUTER QUADRANT OF LEFT BREAST IN FEMALE, ESTROGEN RECEPTOR POSITIVE: Primary | ICD-10-CM

## 2024-04-03 DIAGNOSIS — Z17.0 CARCINOMA OF AXILLARY TAIL OF LEFT BREAST IN FEMALE, ESTROGEN RECEPTOR POSITIVE: ICD-10-CM

## 2024-04-03 PROCEDURE — 3074F SYST BP LT 130 MM HG: CPT | Mod: CPTII,S$GLB,, | Performed by: INTERNAL MEDICINE

## 2024-04-03 PROCEDURE — 1160F RVW MEDS BY RX/DR IN RCRD: CPT | Mod: CPTII,S$GLB,, | Performed by: INTERNAL MEDICINE

## 2024-04-03 PROCEDURE — 3008F BODY MASS INDEX DOCD: CPT | Mod: CPTII,S$GLB,, | Performed by: INTERNAL MEDICINE

## 2024-04-03 PROCEDURE — 1159F MED LIST DOCD IN RCRD: CPT | Mod: CPTII,S$GLB,, | Performed by: INTERNAL MEDICINE

## 2024-04-03 PROCEDURE — 99213 OFFICE O/P EST LOW 20 MIN: CPT | Mod: S$GLB,,, | Performed by: INTERNAL MEDICINE

## 2024-04-03 PROCEDURE — 3078F DIAST BP <80 MM HG: CPT | Mod: CPTII,S$GLB,, | Performed by: INTERNAL MEDICINE

## 2024-04-03 PROCEDURE — 99999 PR PBB SHADOW E&M-EST. PATIENT-LVL IV: CPT | Mod: PBBFAC,,, | Performed by: INTERNAL MEDICINE

## 2024-04-03 NOTE — PROGRESS NOTES
Subjective:       Patient ID: Malika Curtis is a 59 y.o. female.    Chief Complaint: Malignant neoplasm of upper-outer quadrant of left breast i      HPI     Mrs. Curtis returns today for follow-up  I had last seen her on December 6th, 2023.  As of November 1, 2022 she is on Arimidex in the adjuvant setting, and she has tolerated it well so far without any side effects.  Her DXA scan shows normal bone density.    Of note, she had undergone bilateral mastectomies with reconstruction on September 21, 2022.  The pathology report indicates that she had a 30 mm left-sided IDCA.  Resection margins were clear while the sentinel lymph node was negative.  There was no invasive or in situ cancer identified in the right mastectomy specimen.  Her Oncotype score is 19 indicating a 94% chance of disease-free survival at 10 years with hormonal therapy alone.  The benefit of chemotherapy was estimated at less than 1%.    Briefly, she is a 59-year-old female with a recent diagnosis of breast cancer referred for evaluation.  Apparently she had an abnormal mammogram on 05/23/2022 that showed a focal asymmetry at the upper outer anterior position of the left breast.  A diagnostic mammogram and ultrasound on 06/22/2022 showed a 6.6 cm area of calcifications.  The ultrasound showed an irregular hypoechoic mass with angular and indistinct margins measuring 1.5 cm.    A biopsy was performed on 08/08/2022.  The area of calcifications showed DCIS, low grade that was ER positive and KS positive.  The mass at the 2 o'clock position showed a grade 1 invasive ductal carcinoma measuring at least 9 mm which was ER strongly positive, KS strongly positive, HER2 negative with a Ki-67 of 8%       ROS:  Overall she feels well and she has no complaints.  Her ECOG PS is 0.  So far she has tolerated the anastrozole well so far and her only complaint is minor hot flashes.  She has not experienced any musculoskeletal side effects.  She denies any  depression, easy bruising, fevers, chills, night  sweats, weight loss, nausea, vomiting, diarrhea, constipation, diplopia, blurred vision, headache, palpitations, shortness of breath, abdominal pain, extremity pain, or difficulty ambulating.  The remainder of the ten-point ROS, including general, skin, lymph, H/N, cardiorespiratory, GI, , Neuro, Endocrine, and psychiatric is negative.     Objective:      Physical Exam      She is alert, oriented to time, place, person, pleasant, well      nourished, in no acute physical distress.                                   VITAL SIGNS:  Reviewed                                      HEENT:  Normal.  There are no nasal, oral, lip, gingival, auricular, lid,    or conjunctival lesions.  Mucosae are moist and pink, and there is no        thrush.  Pupils are equal, reactive to light and accommodation.              Extraocular muscle movements are intact.  Dentition is good.  There is no frontal or maxillary tenderness.                                     NECK:  Supple without JVD, adenopathy, or thyromegaly.                       LUNGS:  Clear to auscultation without wheezing, rales, or rhonchi.           CARDIOVASCULAR:  Reveals an S1, S2, no murmurs, no rubs, no gallops.         ABDOMEN:  Soft, nontender, without organomegaly.  Bowel sounds are present.                                                                     EXTREMITIES:  No cyanosis, clubbing, or edema.                               BREASTS:   She is status post bilateral mastectomies.  Her incisions have healed nicely and there is no evidence of recurrence                                 LYMPHATIC:  There is no cervical, axillary, or supraclavicular adenopathy.   SKIN:  Warm and moist, without petechiae, rashes, induration, or ecchymoses.           NEUROLOGIC:  DTRs are 0-1+ bilaterally, symmetrical, motor function is 5/5,  and cranial nerves are  within normal limits.    Assessment:       1.  Grade 2 IDCA, left  breast, T2 N0 M0 ER strongly positive, NV positive, and HER2 negative, now status post bilateral mastectomies       2. Adjuvant use of anastrozole  Plan:         I had a long discussion with Mrs. Curtis.  She will remain on anastrozole which she will take through the end of October 2027, at which point there will be a discussion for possible extension to seven years.  I will see her again in 4 months.    Her multiple questions were answered to her satisfaction.  I spent approximately 20 minutes reviewing the available records and evaluating the patient.          Route Chart for Scheduling    Med Onc Chart Routing      Follow up with physician 4 months.   Follow up with ANDREA    Infusion scheduling note    Injection scheduling note    Labs    Imaging    Pharmacy appointment    Other referrals

## 2024-05-03 ENCOUNTER — OFFICE VISIT (OUTPATIENT)
Dept: SURGERY | Facility: CLINIC | Age: 59
End: 2024-05-03
Payer: COMMERCIAL

## 2024-05-03 VITALS
BODY MASS INDEX: 27.56 KG/M2 | WEIGHT: 146 LBS | HEART RATE: 91 BPM | HEIGHT: 61 IN | DIASTOLIC BLOOD PRESSURE: 77 MMHG | SYSTOLIC BLOOD PRESSURE: 112 MMHG

## 2024-05-03 DIAGNOSIS — C50.412 MALIGNANT NEOPLASM OF UPPER-OUTER QUADRANT OF LEFT BREAST IN FEMALE, ESTROGEN RECEPTOR POSITIVE: ICD-10-CM

## 2024-05-03 DIAGNOSIS — Z90.13 STATUS POST BILATERAL MASTECTOMY: Primary | ICD-10-CM

## 2024-05-03 DIAGNOSIS — Z90.13 S/P MASTECTOMY, BILATERAL: ICD-10-CM

## 2024-05-03 DIAGNOSIS — Z12.39 SCREENING BREAST EXAMINATION: ICD-10-CM

## 2024-05-03 DIAGNOSIS — Z17.0 MALIGNANT NEOPLASM OF UPPER-OUTER QUADRANT OF LEFT BREAST IN FEMALE, ESTROGEN RECEPTOR POSITIVE: ICD-10-CM

## 2024-05-03 PROCEDURE — 3074F SYST BP LT 130 MM HG: CPT | Mod: CPTII,S$GLB,, | Performed by: PHYSICIAN ASSISTANT

## 2024-05-03 PROCEDURE — 3078F DIAST BP <80 MM HG: CPT | Mod: CPTII,S$GLB,, | Performed by: PHYSICIAN ASSISTANT

## 2024-05-03 PROCEDURE — 99999 PR PBB SHADOW E&M-EST. PATIENT-LVL III: CPT | Mod: PBBFAC,,, | Performed by: PHYSICIAN ASSISTANT

## 2024-05-03 PROCEDURE — 99203 OFFICE O/P NEW LOW 30 MIN: CPT | Mod: S$GLB,,, | Performed by: PHYSICIAN ASSISTANT

## 2024-05-03 PROCEDURE — 1159F MED LIST DOCD IN RCRD: CPT | Mod: CPTII,S$GLB,, | Performed by: PHYSICIAN ASSISTANT

## 2024-05-03 PROCEDURE — 3008F BODY MASS INDEX DOCD: CPT | Mod: CPTII,S$GLB,, | Performed by: PHYSICIAN ASSISTANT

## 2024-05-03 NOTE — PROGRESS NOTES
"[facility]         Breast Surgery Surveillance    5/3/2024    DIAGNOSIS:   This is a 59 y.o. female with a history stage pT2 N0 M0 grade 2 ER + IA + HER2 - invasive ductal carcinoma of the left breast.    TREATMENT:   1. bilateral mastectomy with sentinel node biopsy on 9/21/2022. Roxana Stahl M.D. Surgical Oncology  2. 30 mm Grade 2 IDC. Negative margins. Negative nodes. No atypia or malignancy of the right mastectomy.   3. Adjuvant endocrine therapy (Arimidex) started on 11/1/2022. Rohit Mcnair M.D. Medical Oncology     HISTORY OF PRESENT ILLNESS:   Malika Curtis is a 59 y.o. female comes in for oncological follow up.  Still admits to lateral incision pain of her left chest. Worst when she increases weight at gym. She denies change in her breast self-exam specifically denying new masses, skin or nipple changes, or nipple discharge. Past medical and surgical history is updated without new changes. There have been no changes to family history. The patient denies constitutional symptoms of night sweats, weight loss, new headaches, visual changes, new back or bony pain, chest pain, or shortness of breath.      IMAGING:   None     MEDICATIONS/ALLERGIES:     Review of patient's allergies indicates:   Allergen Reactions    Sulfa (sulfonamide antibiotics) Hives       PHYSICAL EXAM:   /77   Pulse 91   Ht 5' 1" (1.549 m)   Wt 66.2 kg (146 lb)   BMI 27.59 kg/m²   Physical Exam   Vitals reviewed.  Constitutional: She is oriented to person, place, and time.   HENT:   Head: Normocephalic and atraumatic.   Nose: Nose normal.   Eyes: Pupils are equal, round, and reactive to light. Right eye exhibits no discharge. Left eye exhibits no discharge.   Pulmonary/Chest: Effort normal and breath sounds normal. No stridor. No respiratory distress. She exhibits no mass, no tenderness and no edema. Right breast exhibits no mass, no skin change and no tenderness. Left breast exhibits no mass, no skin change and " no tenderness. No breast swelling or bleeding. Breasts are symmetrical.       Abdominal: Normal appearance.   Genitourinary: No breast swelling or bleeding.   Neurological: She is alert and oriented to person, place, and time.   Skin: Skin is warm and dry.     Psychiatric: Her behavior is normal. Mood, judgment and thought content normal.      ASSESSMENT:   This is a 59 y.o. female without evidence of recurrence by exam, history or imaging.       PLAN:   1. Continue to follow up with Dr. Harper.  2. Continue monthly self breast exams and call the clinic with any changes or problems.  3. No further screening mammogram due to bilateral mastectomies.   4. Return to clinic in 1 year.    5. Discussed use of lidocaine patches to aid with pain at night. Also discussed use of warm compresses with vitamin E and gentle massage.     The patient is in agreement with the plan. Questions were encouraged and answered to patient's satisfaction. Malika will call our office with any questions or concerns.       25 minutes were spent on this encounter, 15 of which was face to face counseling and 10 minutes were spent on chart review and coordination of care.

## 2024-05-29 DIAGNOSIS — Z79.811 PROPHYLACTIC USE OF ANASTROZOLE (ARIMIDEX): ICD-10-CM

## 2024-05-29 RX ORDER — ANASTROZOLE 1 MG/1
TABLET ORAL
Qty: 90 TABLET | Refills: 0 | Status: SHIPPED | OUTPATIENT
Start: 2024-05-29

## 2024-08-06 ENCOUNTER — OFFICE VISIT (OUTPATIENT)
Dept: HEMATOLOGY/ONCOLOGY | Facility: CLINIC | Age: 59
End: 2024-08-06
Payer: COMMERCIAL

## 2024-08-06 VITALS
BODY MASS INDEX: 28.05 KG/M2 | DIASTOLIC BLOOD PRESSURE: 80 MMHG | HEIGHT: 61 IN | WEIGHT: 148.56 LBS | TEMPERATURE: 98 F | SYSTOLIC BLOOD PRESSURE: 138 MMHG | HEART RATE: 84 BPM | OXYGEN SATURATION: 99 %

## 2024-08-06 DIAGNOSIS — C50.612 CARCINOMA OF AXILLARY TAIL OF LEFT BREAST IN FEMALE, ESTROGEN RECEPTOR POSITIVE: Primary | ICD-10-CM

## 2024-08-06 DIAGNOSIS — Z17.0 CARCINOMA OF AXILLARY TAIL OF LEFT BREAST IN FEMALE, ESTROGEN RECEPTOR POSITIVE: Primary | ICD-10-CM

## 2024-08-06 DIAGNOSIS — Z79.811 PROPHYLACTIC USE OF ANASTROZOLE (ARIMIDEX): ICD-10-CM

## 2024-08-06 DIAGNOSIS — D05.12 DUCTAL CARCINOMA IN SITU (DCIS) OF LEFT BREAST: ICD-10-CM

## 2024-08-06 PROCEDURE — 99999 PR PBB SHADOW E&M-EST. PATIENT-LVL III: CPT | Mod: PBBFAC,,, | Performed by: INTERNAL MEDICINE

## 2024-08-06 PROCEDURE — 3008F BODY MASS INDEX DOCD: CPT | Mod: CPTII,S$GLB,, | Performed by: INTERNAL MEDICINE

## 2024-08-06 PROCEDURE — 3079F DIAST BP 80-89 MM HG: CPT | Mod: CPTII,S$GLB,, | Performed by: INTERNAL MEDICINE

## 2024-08-06 PROCEDURE — 99214 OFFICE O/P EST MOD 30 MIN: CPT | Mod: S$GLB,,, | Performed by: INTERNAL MEDICINE

## 2024-08-06 PROCEDURE — 3075F SYST BP GE 130 - 139MM HG: CPT | Mod: CPTII,S$GLB,, | Performed by: INTERNAL MEDICINE

## 2024-08-06 RX ORDER — PANTOPRAZOLE SODIUM 40 MG/1
1 TABLET, DELAYED RELEASE ORAL DAILY
COMMUNITY
Start: 2024-07-30

## 2024-08-28 DIAGNOSIS — Z79.811 PROPHYLACTIC USE OF ANASTROZOLE (ARIMIDEX): ICD-10-CM

## 2024-08-28 RX ORDER — ANASTROZOLE 1 MG/1
TABLET ORAL
Qty: 90 TABLET | Refills: 0 | Status: SHIPPED | OUTPATIENT
Start: 2024-08-28

## 2024-11-26 DIAGNOSIS — Z79.811 PROPHYLACTIC USE OF ANASTROZOLE (ARIMIDEX): ICD-10-CM

## 2024-11-26 RX ORDER — ANASTROZOLE 1 MG/1
TABLET ORAL
Qty: 90 TABLET | Refills: 0 | Status: SHIPPED | OUTPATIENT
Start: 2024-11-26

## 2024-12-04 ENCOUNTER — OFFICE VISIT (OUTPATIENT)
Dept: HEMATOLOGY/ONCOLOGY | Facility: CLINIC | Age: 59
End: 2024-12-04
Payer: COMMERCIAL

## 2024-12-04 VITALS
TEMPERATURE: 97 F | BODY MASS INDEX: 27.55 KG/M2 | OXYGEN SATURATION: 97 % | DIASTOLIC BLOOD PRESSURE: 75 MMHG | HEART RATE: 99 BPM | SYSTOLIC BLOOD PRESSURE: 121 MMHG | HEIGHT: 61 IN | WEIGHT: 145.94 LBS

## 2024-12-04 DIAGNOSIS — Z17.0 CARCINOMA OF AXILLARY TAIL OF LEFT BREAST IN FEMALE, ESTROGEN RECEPTOR POSITIVE: Primary | ICD-10-CM

## 2024-12-04 DIAGNOSIS — Z79.811 PROPHYLACTIC USE OF ANASTROZOLE (ARIMIDEX): ICD-10-CM

## 2024-12-04 DIAGNOSIS — D05.12 DUCTAL CARCINOMA IN SITU (DCIS) OF LEFT BREAST: ICD-10-CM

## 2024-12-04 DIAGNOSIS — C50.612 CARCINOMA OF AXILLARY TAIL OF LEFT BREAST IN FEMALE, ESTROGEN RECEPTOR POSITIVE: Primary | ICD-10-CM

## 2024-12-04 PROCEDURE — 99999 PR PBB SHADOW E&M-EST. PATIENT-LVL III: CPT | Mod: PBBFAC,,, | Performed by: INTERNAL MEDICINE

## 2024-12-04 NOTE — PROGRESS NOTES
Subjective:       Patient ID: Malika Curtis is a 59 y.o. female.    Chief Complaint: No chief complaint on file.      HPI     Mrs. Curtis returns today for follow-up  I had last seen her on August 6, 2024.  As of November 1, 2022 she is on Arimidex in the adjuvant setting, and she has tolerated it well so far without any side effects.  Her DXA scan shows normal bone density.    Of note, she had undergone bilateral mastectomies with reconstruction on September 21, 2022.  The pathology report indicates that she had a 30 mm left-sided IDCA.  Resection margins were clear while the sentinel lymph node was negative.  There was no invasive or in situ cancer identified in the right mastectomy specimen.  Her Oncotype score is 19 indicating a 94% chance of disease-free survival at 10 years with hormonal therapy alone.  The benefit of chemotherapy was estimated at less than 1%.    Briefly, she is a 59-year-old female with breast cancer referred for evaluation.  Apparently she had an abnormal mammogram on 05/23/2022 that showed a focal asymmetry at the upper outer anterior position of the left breast.  A diagnostic mammogram and ultrasound on 06/22/2022 showed a 6.6 cm area of calcifications.  The ultrasound showed an irregular hypoechoic mass with angular and indistinct margins measuring 1.5 cm.    A biopsy was performed on 08/08/2022.  The area of calcifications showed DCIS, low grade that was ER positive and TN positive.  The mass at the 2 o'clock position showed a grade 1 invasive ductal carcinoma measuring at least 9 mm which was ER strongly positive, TN strongly positive, HER2 negative with a Ki-67 of 8%       ROS:  Overall she feels well and she has no complaints other than occasional pains associated with her incisions.   Her ECOG PS is 0.  So far she has tolerated the anastrozole well so far and her only complaint is minor hot flashes.  She has not experienced any musculoskeletal side effects.  She denies any  depression, easy bruising, fevers, chills, night  sweats, weight loss, nausea, vomiting, diarrhea, constipation, diplopia, blurred vision, headache, palpitations, shortness of breath, abdominal pain, extremity pain, or difficulty ambulating.  The remainder of the ten-point ROS, including general, skin, lymph, H/N, cardiorespiratory, GI, , Neuro, Endocrine, and psychiatric is negative.     Objective:      Physical Exam      She is alert, oriented to time, place, person, pleasant, well      nourished, in no acute physical distress.                                   VITAL SIGNS:  Reviewed                                      HEENT:  Normal.  There are no nasal, oral, lip, gingival, auricular, lid,    or conjunctival lesions.  Mucosae are moist and pink, and there is no        thrush.  Pupils are equal, reactive to light and accommodation.              Extraocular muscle movements are intact.  Dentition is good.  There is no frontal or maxillary tenderness.                                     NECK:  Supple without JVD, adenopathy, or thyromegaly.                       LUNGS:  Clear to auscultation without wheezing, rales, or rhonchi.           CARDIOVASCULAR:  Reveals an S1, S2, no murmurs, no rubs, no gallops.         ABDOMEN:  Soft, nontender, without organomegaly.  Bowel sounds are present.                                                                     EXTREMITIES:  No cyanosis, clubbing, or edema.                               BREASTS:   She is status post bilateral mastectomies.  Her incisions have healed nicely and there is no evidence of recurrence                                 LYMPHATIC:  There is no cervical, axillary, or supraclavicular adenopathy.   SKIN:  Warm and moist, without petechiae, rashes, induration, or ecchymoses.           NEUROLOGIC:  DTRs are 0-1+ bilaterally, symmetrical, motor function is 5/5,  and cranial nerves are  within normal limits.    Assessment:       1.  Grade 2 IDCA, left  breast, T2 N0 M0 ER strongly positive, GA positive, and HER2 negative, now status post bilateral mastectomies      2.  Adjuvant use of anastrozole  Plan:         I had a long discussion with Mrs. Curtis.  She will remain on anastrozole which she will take through the end of October 2027, at which point we will discuss the option of a possible extension to seven years based on the BCI test, if she is willing to extend her treatment.  I will see her again in 4 months (at her request).    Finally, her DXA scan will need to be scheduled prior to her next visit.  The most recent one from December 2022 had shown normal bone density (reviewed).  Her multiple questions were answered to her satisfaction.  I spent approximately 30 minutes reviewing the available records and evaluating the patient.          Route Chart for Scheduling    Med Onc Chart Routing      Follow up with physician 4 months. with DXA scan   Follow up with ANDREA    Infusion scheduling note    Injection scheduling note    Labs    Imaging    Pharmacy appointment    Other referrals

## 2024-12-18 DIAGNOSIS — C50.412 MALIGNANT NEOPLASM OF UPPER-OUTER QUADRANT OF LEFT BREAST IN FEMALE, ESTROGEN RECEPTOR POSITIVE: ICD-10-CM

## 2024-12-18 DIAGNOSIS — Z17.0 CARCINOMA OF AXILLARY TAIL OF LEFT BREAST IN FEMALE, ESTROGEN RECEPTOR POSITIVE: Primary | ICD-10-CM

## 2024-12-18 DIAGNOSIS — D05.12 DUCTAL CARCINOMA IN SITU (DCIS) OF LEFT BREAST: ICD-10-CM

## 2024-12-18 DIAGNOSIS — C50.612 CARCINOMA OF AXILLARY TAIL OF LEFT BREAST IN FEMALE, ESTROGEN RECEPTOR POSITIVE: Primary | ICD-10-CM

## 2024-12-18 DIAGNOSIS — Z17.0 MALIGNANT NEOPLASM OF UPPER-OUTER QUADRANT OF LEFT BREAST IN FEMALE, ESTROGEN RECEPTOR POSITIVE: ICD-10-CM

## 2025-04-10 ENCOUNTER — OFFICE VISIT (OUTPATIENT)
Dept: HEMATOLOGY/ONCOLOGY | Facility: CLINIC | Age: 60
End: 2025-04-10
Payer: COMMERCIAL

## 2025-04-10 VITALS
SYSTOLIC BLOOD PRESSURE: 136 MMHG | WEIGHT: 141.31 LBS | HEIGHT: 61 IN | DIASTOLIC BLOOD PRESSURE: 78 MMHG | OXYGEN SATURATION: 99 % | BODY MASS INDEX: 26.68 KG/M2 | RESPIRATION RATE: 17 BRPM | HEART RATE: 85 BPM | TEMPERATURE: 97 F

## 2025-04-10 DIAGNOSIS — Z79.811 PROPHYLACTIC USE OF ANASTROZOLE (ARIMIDEX): ICD-10-CM

## 2025-04-10 DIAGNOSIS — Z79.811 LONG TERM (CURRENT) USE OF AROMATASE INHIBITORS: ICD-10-CM

## 2025-04-10 DIAGNOSIS — C50.612 CARCINOMA OF AXILLARY TAIL OF LEFT BREAST IN FEMALE, ESTROGEN RECEPTOR POSITIVE: Primary | ICD-10-CM

## 2025-04-10 DIAGNOSIS — Z17.0 CARCINOMA OF AXILLARY TAIL OF LEFT BREAST IN FEMALE, ESTROGEN RECEPTOR POSITIVE: Primary | ICD-10-CM

## 2025-04-10 PROCEDURE — 3075F SYST BP GE 130 - 139MM HG: CPT | Mod: CPTII,S$GLB,, | Performed by: PHYSICIAN ASSISTANT

## 2025-04-10 PROCEDURE — 3008F BODY MASS INDEX DOCD: CPT | Mod: CPTII,S$GLB,, | Performed by: PHYSICIAN ASSISTANT

## 2025-04-10 PROCEDURE — 3078F DIAST BP <80 MM HG: CPT | Mod: CPTII,S$GLB,, | Performed by: PHYSICIAN ASSISTANT

## 2025-04-10 PROCEDURE — 99999 PR PBB SHADOW E&M-EST. PATIENT-LVL IV: CPT | Mod: PBBFAC,,, | Performed by: PHYSICIAN ASSISTANT

## 2025-04-10 PROCEDURE — 1159F MED LIST DOCD IN RCRD: CPT | Mod: CPTII,S$GLB,, | Performed by: PHYSICIAN ASSISTANT

## 2025-04-10 PROCEDURE — 99213 OFFICE O/P EST LOW 20 MIN: CPT | Mod: S$GLB,,, | Performed by: PHYSICIAN ASSISTANT

## 2025-04-10 RX ORDER — ANASTROZOLE 1 MG/1
1 TABLET ORAL
Qty: 90 TABLET | Refills: 0 | Status: SHIPPED | OUTPATIENT
Start: 2025-04-10

## 2025-04-10 NOTE — PROGRESS NOTES
Subjective     Patient ID: Malika Curtis is a 60 y.o. female.    Chief Complaint: Carcinoma of axillary tail of left breast in female, estrog    Mrs. Curtis returns today for follow-up  Last seen by Dr. Harper on 12/4/2024.  As of November 1, 2022 she is on Arimidex in the adjuvant setting, and she has tolerated it well so far without any side effects.  Her DXA scan shows normal bone density.     Of note, she had undergone bilateral mastectomies with reconstruction on September 21, 2022.  The pathology report indicates that she had a 30 mm left-sided IDCA.  Resection margins were clear while the sentinel lymph node was negative.  There was no invasive or in situ cancer identified in the right mastectomy specimen.  Her Oncotype score is 19 indicating a 94% chance of disease-free survival at 10 years with hormonal therapy alone.  The benefit of chemotherapy was estimated at less than 1%.     Briefly, she is a 60-year-old female with breast cancer referred for evaluation.  Apparently she had an abnormal mammogram on 05/23/2022 that showed a focal asymmetry at the upper outer anterior position of the left breast.  A diagnostic mammogram and ultrasound on 06/22/2022 showed a 6.6 cm area of calcifications.  The ultrasound showed an irregular hypoechoic mass with angular and indistinct margins measuring 1.5 cm.    A biopsy was performed on 08/08/2022.  The area of calcifications showed DCIS, low grade that was ER positive and OH positive.  The mass at the 2 o'clock position showed a grade 1 invasive ductal carcinoma measuring at least 9 mm which was ER strongly positive, OH strongly positive, HER2 negative with a Ki-67 of 8%        ROS:   Overall she feels well and she has no complaints other than occasional pains associated with her incisions.  She has had insomnia since starting AI therapy  but states that is manageable. She has intermittent pruritus of both feet- no known trigger.   She has some minor occasional hot  flashes. .  She has not experienced any musculoskeletal side effects.  She denies any depression, easy bruising, fevers, chills, night  sweats, weight loss, nausea, vomiting, diarrhea, constipation, diplopia, blurred vision, headache, palpitations, shortness of breath, abdominal pain, extremity pain, or difficulty ambulating.  The remainder of the ten-point ROS, including general, skin, lymph, H/N, cardiorespiratory, GI, , Neuro, Endocrine, and psychiatric is negative.            Review of Systems   Constitutional: Negative.    HENT:  Positive for rhinorrhea (secondary to seasonal allergies). Negative for nasal congestion, sore throat and trouble swallowing.    Eyes:  Negative for visual disturbance.   Respiratory:  Negative for cough, chest tightness and shortness of breath.    Cardiovascular:  Negative for chest pain, palpitations and leg swelling.   Gastrointestinal:  Negative for abdominal pain, blood in stool, constipation, diarrhea, nausea and vomiting.   Genitourinary:  Negative for dysuria, hematuria and vaginal bleeding.   Musculoskeletal:  Negative for arthralgias, back pain and myalgias.   Integumentary:  Negative for pallor and rash.   Neurological:  Negative for dizziness, weakness and headaches.   Hematological:  Negative for adenopathy. Does not bruise/bleed easily.   Psychiatric/Behavioral:  Positive for sleep disturbance (notes insomnia since being on arimidex). Negative for dysphoric mood and suicidal ideas. The patient is not nervous/anxious.           Objective     Physical Exam  Vitals reviewed.   Constitutional:       General: She is not in acute distress.     Appearance: She is well-developed.   HENT:      Head: Normocephalic.      Mouth/Throat:      Pharynx: No oropharyngeal exudate.   Eyes:      General: No scleral icterus.     Conjunctiva/sclera: Conjunctivae normal.      Pupils: Pupils are equal, round, and reactive to light.   Neck:      Thyroid: No thyromegaly.   Cardiovascular:       Rate and Rhythm: Normal rate and regular rhythm.   Pulmonary:      Effort: Pulmonary effort is normal. No respiratory distress.      Breath sounds: Normal breath sounds.      Comments: S/p bilateral mastectomies. No chest wall mass, nodule or skin changes. She does have residual fatty tisue at the lateral aspect of both mastectomy incisions. No axillary or supraclavicular adenopathy.   Abdominal:      General: Bowel sounds are normal. There is no distension.      Palpations: Abdomen is soft. There is no mass.      Tenderness: There is no abdominal tenderness.   Musculoskeletal:         General: No tenderness. Normal range of motion.      Cervical back: Normal range of motion and neck supple.      Comments: No spinal or paraspinal tenderness to palpation     Lymphadenopathy:      Cervical: No cervical adenopathy.   Skin:     General: Skin is warm and dry.   Neurological:      Mental Status: She is alert and oriented to person, place, and time.      Cranial Nerves: No cranial nerve deficit.   Psychiatric:         Behavior: Behavior normal.         Thought Content: Thought content normal.            Assessment and Plan     1. Carcinoma of axillary tail of left breast in female, estrogen receptor positive      Patient is tolerating AI therapy, continue Arimidex which she will be on until 10/2027.  At that time discussion will be had on BCI testing to determine if further medication is of benefit.   Patient is due for routine bone density, will send in referral.  RTC in 4 months.     No follow-ups on file.

## 2025-07-10 DIAGNOSIS — Z79.811 PROPHYLACTIC USE OF ANASTROZOLE (ARIMIDEX): ICD-10-CM

## 2025-07-10 RX ORDER — ANASTROZOLE 1 MG/1
1 TABLET ORAL
Qty: 90 TABLET | Refills: 0 | Status: SHIPPED | OUTPATIENT
Start: 2025-07-10

## (undated) DEVICE — ELECTRODE BLADE INSULATED 1 IN

## (undated) DEVICE — SUT 2/0 30IN SILK BLK BRAI

## (undated) DEVICE — DRAIN CHANNEL ROUND 19FR

## (undated) DEVICE — BRA CLASSIC COMFORM BLK SZ 38

## (undated) DEVICE — DRAPE THREE-QTR REINF 53X77IN

## (undated) DEVICE — SPONGE LAP 18X18 PREWASHED

## (undated) DEVICE — SUT ETHILON 3-0 FS-1 30

## (undated) DEVICE — APPLICATOR CHLORAPREP ORN 26ML

## (undated) DEVICE — SPONGE SUPER KERLIX 6X6.75IN

## (undated) DEVICE — ADHESIVE DERMABOND ADVANCED

## (undated) DEVICE — EVACUATOR WOUND BULB 100CC

## (undated) DEVICE — APPLIER CLIP LIAGCLIP 9.375IN

## (undated) DEVICE — ELECTRODE REM PLYHSV RETURN 9

## (undated) DEVICE — GOWN NONREINF SET-IN SLV XL

## (undated) DEVICE — DRESSING DERMACEA SPNG 10S

## (undated) DEVICE — NDL SAFETY 22G X 1.5 ECLIPSE

## (undated) DEVICE — CONTAINER SPECIMEN OR STER 4OZ

## (undated) DEVICE — DRAPE STERI INSTRUMENT 1018

## (undated) DEVICE — SYR 10CC LUER LOCK

## (undated) DEVICE — BRA SURGICAL MED 36-38

## (undated) DEVICE — SUT VICRYL 3-0 27 SH

## (undated) DEVICE — Device

## (undated) DEVICE — SUT CTD VICRYL 4-0 P-2